# Patient Record
Sex: FEMALE | Race: WHITE | Employment: OTHER | URBAN - METROPOLITAN AREA
[De-identification: names, ages, dates, MRNs, and addresses within clinical notes are randomized per-mention and may not be internally consistent; named-entity substitution may affect disease eponyms.]

---

## 2018-10-03 ENCOUNTER — HOSPITAL ENCOUNTER (EMERGENCY)
Age: 83
Discharge: HOME | End: 2018-10-03
Payer: MEDICARE

## 2018-10-03 ENCOUNTER — APPOINTMENT (OUTPATIENT)
Dept: CT IMAGING | Age: 83
End: 2018-10-03
Payer: MEDICARE

## 2018-10-03 VITALS
BODY MASS INDEX: 36.68 KG/M2 | HEIGHT: 57 IN | RESPIRATION RATE: 20 BRPM | DIASTOLIC BLOOD PRESSURE: 66 MMHG | TEMPERATURE: 97.8 F | HEART RATE: 63 BPM | SYSTOLIC BLOOD PRESSURE: 152 MMHG | WEIGHT: 170 LBS | OXYGEN SATURATION: 97 %

## 2018-10-03 DIAGNOSIS — R16.1 SPLENOMEGALY: ICD-10-CM

## 2018-10-03 DIAGNOSIS — R10.9 FLANK PAIN: ICD-10-CM

## 2018-10-03 DIAGNOSIS — R35.0 URINARY FREQUENCY: Primary | ICD-10-CM

## 2018-10-03 LAB
ALBUMIN SERPL-MCNC: 3.9 G/DL (ref 3.5–5.2)
ALP BLD-CCNC: 58 U/L (ref 35–104)
ALT SERPL-CCNC: 9 U/L (ref 0–32)
ANION GAP SERPL CALCULATED.3IONS-SCNC: 12 MMOL/L (ref 7–16)
AST SERPL-CCNC: 19 U/L (ref 0–31)
BASOPHILS ABSOLUTE: 0.06 E9/L (ref 0–0.2)
BASOPHILS RELATIVE PERCENT: 0.7 % (ref 0–2)
BILIRUB SERPL-MCNC: 0.4 MG/DL (ref 0–1.2)
BILIRUBIN URINE: NEGATIVE
BLOOD, URINE: NEGATIVE
BUN BLDV-MCNC: 18 MG/DL (ref 8–23)
CALCIUM SERPL-MCNC: 9.3 MG/DL (ref 8.6–10.2)
CHLORIDE BLD-SCNC: 106 MMOL/L (ref 98–107)
CLARITY: CLEAR
CO2: 22 MMOL/L (ref 22–29)
COLOR: YELLOW
CREAT SERPL-MCNC: 1 MG/DL (ref 0.5–1)
EOSINOPHILS ABSOLUTE: 0.17 E9/L (ref 0.05–0.5)
EOSINOPHILS RELATIVE PERCENT: 1.9 % (ref 0–6)
GFR AFRICAN AMERICAN: >60
GFR NON-AFRICAN AMERICAN: 52 ML/MIN/1.73
GLUCOSE BLD-MCNC: 190 MG/DL (ref 74–109)
GLUCOSE URINE: NEGATIVE MG/DL
HCT VFR BLD CALC: 37.1 % (ref 34–48)
HEMOGLOBIN: 11.7 G/DL (ref 11.5–15.5)
IMMATURE GRANULOCYTES #: 0.15 E9/L
IMMATURE GRANULOCYTES %: 1.7 % (ref 0–5)
KETONES, URINE: NEGATIVE MG/DL
LEUKOCYTE ESTERASE, URINE: NEGATIVE
LYMPHOCYTES ABSOLUTE: 2.24 E9/L (ref 1.5–4)
LYMPHOCYTES RELATIVE PERCENT: 25.4 % (ref 20–42)
MCH RBC QN AUTO: 28 PG (ref 26–35)
MCHC RBC AUTO-ENTMCNC: 31.5 % (ref 32–34.5)
MCV RBC AUTO: 88.8 FL (ref 80–99.9)
MONOCYTES ABSOLUTE: 0.72 E9/L (ref 0.1–0.95)
MONOCYTES RELATIVE PERCENT: 8.2 % (ref 2–12)
NEUTROPHILS ABSOLUTE: 5.49 E9/L (ref 1.8–7.3)
NEUTROPHILS RELATIVE PERCENT: 62.1 % (ref 43–80)
NITRITE, URINE: NEGATIVE
PDW BLD-RTO: 14.5 FL (ref 11.5–15)
PH UA: 5.5 (ref 5–9)
PLATELET # BLD: 196 E9/L (ref 130–450)
PMV BLD AUTO: 12.2 FL (ref 7–12)
POTASSIUM SERPL-SCNC: 4.3 MMOL/L (ref 3.5–5)
PROTEIN UA: NEGATIVE MG/DL
RBC # BLD: 4.18 E12/L (ref 3.5–5.5)
SODIUM BLD-SCNC: 140 MMOL/L (ref 132–146)
SPECIFIC GRAVITY UA: 1.01 (ref 1–1.03)
TOTAL PROTEIN: 6.9 G/DL (ref 6.4–8.3)
UROBILINOGEN, URINE: 0.2 E.U./DL
WBC # BLD: 8.8 E9/L (ref 4.5–11.5)

## 2018-10-03 PROCEDURE — 80053 COMPREHEN METABOLIC PANEL: CPT

## 2018-10-03 PROCEDURE — 85025 COMPLETE CBC W/AUTO DIFF WBC: CPT

## 2018-10-03 PROCEDURE — 6360000004 HC RX CONTRAST MEDICATION: Performed by: RADIOLOGY

## 2018-10-03 PROCEDURE — 99284 EMERGENCY DEPT VISIT MOD MDM: CPT

## 2018-10-03 PROCEDURE — 74177 CT ABD & PELVIS W/CONTRAST: CPT

## 2018-10-03 PROCEDURE — 36415 COLL VENOUS BLD VENIPUNCTURE: CPT

## 2018-10-03 PROCEDURE — 87088 URINE BACTERIA CULTURE: CPT

## 2018-10-03 PROCEDURE — 81003 URINALYSIS AUTO W/O SCOPE: CPT

## 2018-10-03 RX ADMIN — IOPAMIDOL 80 ML: 755 INJECTION, SOLUTION INTRAVENOUS at 10:35

## 2018-10-03 ASSESSMENT — PAIN DESCRIPTION - ORIENTATION: ORIENTATION: LEFT;RIGHT

## 2018-10-03 ASSESSMENT — PAIN DESCRIPTION - DESCRIPTORS: DESCRIPTORS: ACHING

## 2018-10-03 ASSESSMENT — PAIN DESCRIPTION - LOCATION: LOCATION: FLANK

## 2018-10-03 ASSESSMENT — PAIN DESCRIPTION - FREQUENCY: FREQUENCY: CONTINUOUS

## 2018-10-03 ASSESSMENT — PAIN SCALES - GENERAL: PAINLEVEL_OUTOF10: 5

## 2018-10-03 ASSESSMENT — PAIN DESCRIPTION - PAIN TYPE: TYPE: ACUTE PAIN

## 2018-10-05 LAB — URINE CULTURE, ROUTINE: NORMAL

## 2019-02-15 ENCOUNTER — OFFICE VISIT (OUTPATIENT)
Dept: ORTHOPEDIC SURGERY | Age: 84
End: 2019-02-15
Payer: MEDICARE

## 2019-02-15 VITALS — BODY MASS INDEX: 36.68 KG/M2 | HEIGHT: 57 IN | WEIGHT: 170 LBS

## 2019-02-15 DIAGNOSIS — M70.62 TROCHANTERIC BURSITIS, LEFT HIP: ICD-10-CM

## 2019-02-15 DIAGNOSIS — M25.552 LEFT HIP PAIN: ICD-10-CM

## 2019-02-15 DIAGNOSIS — M54.32 SCIATICA OF LEFT SIDE: Primary | ICD-10-CM

## 2019-02-15 PROCEDURE — 99203 OFFICE O/P NEW LOW 30 MIN: CPT | Performed by: ORTHOPAEDIC SURGERY

## 2019-03-12 ENCOUNTER — OFFICE VISIT (OUTPATIENT)
Dept: PHYSICAL MEDICINE AND REHAB | Age: 84
End: 2019-03-12
Payer: MEDICARE

## 2019-03-12 VITALS
HEIGHT: 60 IN | BODY MASS INDEX: 32.59 KG/M2 | HEART RATE: 98 BPM | DIASTOLIC BLOOD PRESSURE: 75 MMHG | SYSTOLIC BLOOD PRESSURE: 140 MMHG | WEIGHT: 166 LBS

## 2019-03-12 DIAGNOSIS — Z74.09 IMPAIRED MOBILITY AND ADLS: ICD-10-CM

## 2019-03-12 DIAGNOSIS — M79.18 LEFT BUTTOCK PAIN: Primary | ICD-10-CM

## 2019-03-12 DIAGNOSIS — Z78.9 IMPAIRED MOBILITY AND ADLS: ICD-10-CM

## 2019-03-12 DIAGNOSIS — M53.3 SACROILIAC DYSFUNCTION: ICD-10-CM

## 2019-03-12 PROCEDURE — 99203 OFFICE O/P NEW LOW 30 MIN: CPT | Performed by: PHYSICAL MEDICINE & REHABILITATION

## 2019-07-15 ENCOUNTER — HOSPITAL ENCOUNTER (OUTPATIENT)
Dept: ULTRASOUND IMAGING | Age: 84
Discharge: HOME | End: 2019-07-15
Payer: MEDICARE

## 2019-07-15 DIAGNOSIS — R52 PAIN: ICD-10-CM

## 2019-07-15 PROCEDURE — 93971 EXTREMITY STUDY: CPT

## 2019-10-26 ENCOUNTER — APPOINTMENT (OUTPATIENT)
Dept: GENERAL RADIOLOGY | Age: 84
End: 2019-10-26
Payer: MEDICARE

## 2019-10-26 ENCOUNTER — APPOINTMENT (OUTPATIENT)
Dept: CT IMAGING | Age: 84
End: 2019-10-26
Payer: MEDICARE

## 2019-10-26 ENCOUNTER — HOSPITAL ENCOUNTER (EMERGENCY)
Age: 84
Discharge: ANOTHER HOSPITAL | End: 2019-10-26
Attending: EMERGENCY MEDICINE
Payer: MEDICARE

## 2019-10-26 VITALS
SYSTOLIC BLOOD PRESSURE: 106 MMHG | OXYGEN SATURATION: 95 % | BODY MASS INDEX: 35.48 KG/M2 | RESPIRATION RATE: 16 BRPM | WEIGHT: 169 LBS | HEIGHT: 58 IN | HEART RATE: 72 BPM | DIASTOLIC BLOOD PRESSURE: 43 MMHG | TEMPERATURE: 98.2 F

## 2019-10-26 DIAGNOSIS — I10 HYPERTENSION, UNSPECIFIED TYPE: ICD-10-CM

## 2019-10-26 DIAGNOSIS — R42 DIZZINESS: ICD-10-CM

## 2019-10-26 DIAGNOSIS — G93.6 VASOGENIC CEREBRAL EDEMA (HCC): ICD-10-CM

## 2019-10-26 DIAGNOSIS — I61.4 LEFT-SIDED NONTRAUMATIC INTRACEREBRAL HEMORRHAGE OF CEREBELLUM (HCC): Primary | ICD-10-CM

## 2019-10-26 DIAGNOSIS — R11.2 NON-INTRACTABLE VOMITING WITH NAUSEA, UNSPECIFIED VOMITING TYPE: ICD-10-CM

## 2019-10-26 DIAGNOSIS — R51.9 ACUTE INTRACTABLE HEADACHE, UNSPECIFIED HEADACHE TYPE: ICD-10-CM

## 2019-10-26 LAB
ANION GAP SERPL CALCULATED.3IONS-SCNC: 9 MMOL/L (ref 7–16)
BACTERIA: NORMAL /HPF
BILIRUBIN URINE: NEGATIVE
BLOOD, URINE: NEGATIVE
BUN BLDV-MCNC: 19 MG/DL (ref 8–23)
CALCIUM SERPL-MCNC: 9 MG/DL (ref 8.6–10.2)
CHLORIDE BLD-SCNC: 108 MMOL/L (ref 98–107)
CLARITY: CLEAR
CO2: 23 MMOL/L (ref 22–29)
COLOR: YELLOW
CREAT SERPL-MCNC: 0.9 MG/DL (ref 0.5–1)
EPITHELIAL CELLS, UA: NORMAL /HPF
GFR AFRICAN AMERICAN: >60
GFR NON-AFRICAN AMERICAN: 59 ML/MIN/1.73
GLUCOSE BLD-MCNC: 284 MG/DL (ref 74–99)
GLUCOSE URINE: 100 MG/DL
HCT VFR BLD CALC: 33.3 % (ref 34–48)
HEMOGLOBIN: 10.5 G/DL (ref 11.5–15.5)
KETONES, URINE: NEGATIVE MG/DL
LEUKOCYTE ESTERASE, URINE: NEGATIVE
MCH RBC QN AUTO: 28.3 PG (ref 26–35)
MCHC RBC AUTO-ENTMCNC: 31.5 % (ref 32–34.5)
MCV RBC AUTO: 89.8 FL (ref 80–99.9)
NITRITE, URINE: NEGATIVE
PDW BLD-RTO: 14.8 FL (ref 11.5–15)
PH UA: 6.5 (ref 5–9)
PLATELET # BLD: 230 E9/L (ref 130–450)
PMV BLD AUTO: 11.3 FL (ref 7–12)
POTASSIUM SERPL-SCNC: 4 MMOL/L (ref 3.5–5)
PRO-BNP: 200 PG/ML (ref 0–450)
PROTEIN UA: ABNORMAL MG/DL
RBC # BLD: 3.71 E12/L (ref 3.5–5.5)
RBC UA: NORMAL /HPF (ref 0–2)
SODIUM BLD-SCNC: 140 MMOL/L (ref 132–146)
SPECIFIC GRAVITY UA: 1.01 (ref 1–1.03)
TROPONIN: <0.01 NG/ML (ref 0–0.03)
UROBILINOGEN, URINE: 0.2 E.U./DL
WBC # BLD: 7.4 E9/L (ref 4.5–11.5)
WBC UA: NORMAL /HPF (ref 0–5)

## 2019-10-26 PROCEDURE — 84484 ASSAY OF TROPONIN QUANT: CPT

## 2019-10-26 PROCEDURE — 93005 ELECTROCARDIOGRAM TRACING: CPT | Performed by: PHYSICIAN ASSISTANT

## 2019-10-26 PROCEDURE — 96365 THER/PROPH/DIAG IV INF INIT: CPT

## 2019-10-26 PROCEDURE — 96375 TX/PRO/DX INJ NEW DRUG ADDON: CPT

## 2019-10-26 PROCEDURE — 96376 TX/PRO/DX INJ SAME DRUG ADON: CPT

## 2019-10-26 PROCEDURE — 81001 URINALYSIS AUTO W/SCOPE: CPT

## 2019-10-26 PROCEDURE — 83880 ASSAY OF NATRIURETIC PEPTIDE: CPT

## 2019-10-26 PROCEDURE — 6370000000 HC RX 637 (ALT 250 FOR IP): Performed by: PHYSICIAN ASSISTANT

## 2019-10-26 PROCEDURE — 71045 X-RAY EXAM CHEST 1 VIEW: CPT

## 2019-10-26 PROCEDURE — 2500000003 HC RX 250 WO HCPCS: Performed by: EMERGENCY MEDICINE

## 2019-10-26 PROCEDURE — 36415 COLL VENOUS BLD VENIPUNCTURE: CPT

## 2019-10-26 PROCEDURE — 6360000002 HC RX W HCPCS: Performed by: EMERGENCY MEDICINE

## 2019-10-26 PROCEDURE — 87088 URINE BACTERIA CULTURE: CPT

## 2019-10-26 PROCEDURE — 85027 COMPLETE CBC AUTOMATED: CPT

## 2019-10-26 PROCEDURE — 70450 CT HEAD/BRAIN W/O DYE: CPT

## 2019-10-26 PROCEDURE — 6360000002 HC RX W HCPCS: Performed by: PHYSICIAN ASSISTANT

## 2019-10-26 PROCEDURE — 99291 CRITICAL CARE FIRST HOUR: CPT

## 2019-10-26 PROCEDURE — 80048 BASIC METABOLIC PNL TOTAL CA: CPT

## 2019-10-26 RX ORDER — ACETAMINOPHEN 325 MG/1
650 TABLET ORAL ONCE
Status: COMPLETED | OUTPATIENT
Start: 2019-10-26 | End: 2019-10-26

## 2019-10-26 RX ORDER — DEXAMETHASONE SODIUM PHOSPHATE 4 MG/ML
4 INJECTION, SOLUTION INTRA-ARTICULAR; INTRALESIONAL; INTRAMUSCULAR; INTRAVENOUS; SOFT TISSUE ONCE
Status: COMPLETED | OUTPATIENT
Start: 2019-10-26 | End: 2019-10-26

## 2019-10-26 RX ORDER — SODIUM CHLORIDE 0.9 % (FLUSH) 0.9 %
SYRINGE (ML) INJECTION
Status: DISCONTINUED
Start: 2019-10-26 | End: 2019-10-26 | Stop reason: HOSPADM

## 2019-10-26 RX ORDER — METOCLOPRAMIDE HYDROCHLORIDE 5 MG/ML
5 INJECTION INTRAMUSCULAR; INTRAVENOUS ONCE
Status: COMPLETED | OUTPATIENT
Start: 2019-10-26 | End: 2019-10-26

## 2019-10-26 RX ORDER — MECLIZINE HCL 12.5 MG/1
12.5 TABLET ORAL ONCE
Status: COMPLETED | OUTPATIENT
Start: 2019-10-26 | End: 2019-10-26

## 2019-10-26 RX ORDER — FENTANYL CITRATE 50 UG/ML
25 INJECTION, SOLUTION INTRAMUSCULAR; INTRAVENOUS ONCE
Status: COMPLETED | OUTPATIENT
Start: 2019-10-26 | End: 2019-10-26

## 2019-10-26 RX ORDER — LEVETIRACETAM 5 MG/ML
500 INJECTION INTRAVASCULAR ONCE
Status: COMPLETED | OUTPATIENT
Start: 2019-10-26 | End: 2019-10-26

## 2019-10-26 RX ORDER — LABETALOL 20 MG/4 ML (5 MG/ML) INTRAVENOUS SYRINGE
10 ONCE
Status: COMPLETED | OUTPATIENT
Start: 2019-10-26 | End: 2019-10-26

## 2019-10-26 RX ORDER — LABETALOL 20 MG/4 ML (5 MG/ML) INTRAVENOUS SYRINGE
5 ONCE
Status: COMPLETED | OUTPATIENT
Start: 2019-10-26 | End: 2019-10-26

## 2019-10-26 RX ORDER — ONDANSETRON 2 MG/ML
4 INJECTION INTRAMUSCULAR; INTRAVENOUS ONCE
Status: COMPLETED | OUTPATIENT
Start: 2019-10-26 | End: 2019-10-26

## 2019-10-26 RX ADMIN — ACETAMINOPHEN 650 MG: 325 TABLET, FILM COATED ORAL at 01:39

## 2019-10-26 RX ADMIN — DEXAMETHASONE SODIUM PHOSPHATE 4 MG: 4 INJECTION, SOLUTION INTRA-ARTICULAR; INTRALESIONAL; INTRAMUSCULAR; INTRAVENOUS; SOFT TISSUE at 02:24

## 2019-10-26 RX ADMIN — LEVETIRACETAM 500 MG: 5 INJECTION INTRAVENOUS at 02:24

## 2019-10-26 RX ADMIN — LABETALOL 20 MG/4 ML (5 MG/ML) INTRAVENOUS SYRINGE 10 MG: at 03:01

## 2019-10-26 RX ADMIN — LABETALOL 20 MG/4 ML (5 MG/ML) INTRAVENOUS SYRINGE 5 MG: at 02:21

## 2019-10-26 RX ADMIN — FENTANYL CITRATE 25 MCG: 50 INJECTION, SOLUTION INTRAMUSCULAR; INTRAVENOUS at 02:21

## 2019-10-26 RX ADMIN — ONDANSETRON 4 MG: 2 INJECTION INTRAMUSCULAR; INTRAVENOUS at 01:39

## 2019-10-26 RX ADMIN — FENTANYL CITRATE 25 MCG: 50 INJECTION, SOLUTION INTRAMUSCULAR; INTRAVENOUS at 03:50

## 2019-10-26 RX ADMIN — METOCLOPRAMIDE 5 MG: 5 INJECTION, SOLUTION INTRAMUSCULAR; INTRAVENOUS at 03:02

## 2019-10-26 RX ADMIN — MECLIZINE 12.5 MG: 12.5 TABLET ORAL at 01:39

## 2019-10-26 ASSESSMENT — PAIN SCALES - GENERAL
PAINLEVEL_OUTOF10: 4
PAINLEVEL_OUTOF10: 7
PAINLEVEL_OUTOF10: 4
PAINLEVEL_OUTOF10: 7

## 2019-10-27 LAB
EKG ATRIAL RATE: 78 BPM
EKG P AXIS: 57 DEGREES
EKG P-R INTERVAL: 138 MS
EKG Q-T INTERVAL: 428 MS
EKG QRS DURATION: 94 MS
EKG QTC CALCULATION (BAZETT): 487 MS
EKG R AXIS: -25 DEGREES
EKG T AXIS: 76 DEGREES
EKG VENTRICULAR RATE: 78 BPM

## 2019-10-27 PROCEDURE — 93010 ELECTROCARDIOGRAM REPORT: CPT | Performed by: INTERNAL MEDICINE

## 2019-10-28 LAB — URINE CULTURE, ROUTINE: NORMAL

## 2020-01-31 ENCOUNTER — TELEPHONE (OUTPATIENT)
Dept: SURGERY | Age: 85
End: 2020-01-31

## 2020-01-31 ENCOUNTER — OFFICE VISIT (OUTPATIENT)
Dept: SURGERY | Age: 85
End: 2020-01-31
Payer: MEDICARE

## 2020-01-31 VITALS
BODY MASS INDEX: 35.48 KG/M2 | WEIGHT: 169 LBS | TEMPERATURE: 98.1 F | HEIGHT: 58 IN | RESPIRATION RATE: 17 BRPM | SYSTOLIC BLOOD PRESSURE: 175 MMHG | HEART RATE: 78 BPM | DIASTOLIC BLOOD PRESSURE: 72 MMHG

## 2020-01-31 PROCEDURE — 99204 OFFICE O/P NEW MOD 45 MIN: CPT | Performed by: SURGERY

## 2020-01-31 RX ORDER — SULFAMETHOXAZOLE AND TRIMETHOPRIM 800; 160 MG/1; MG/1
1 TABLET ORAL 2 TIMES DAILY
Qty: 10 TABLET | Refills: 0 | Status: SHIPPED | OUTPATIENT
Start: 2020-01-31 | End: 2020-02-03 | Stop reason: ALTCHOICE

## 2020-01-31 RX ORDER — SODIUM CHLORIDE 9 MG/ML
INJECTION, SOLUTION INTRAVENOUS CONTINUOUS
Status: CANCELLED | OUTPATIENT
Start: 2020-01-31

## 2020-01-31 NOTE — PROGRESS NOTES
Karin Rodriguez  1/31/2020      Office Consult    CHIEF COMPLAINT:  Infected sebaceous cyst left scalp    HISTORY OF PRESENT ILLNESS:  Karin Rodriguez is a 80 y.o.  female with a painful 2 cm cyst on the left scalp. Says it has been present for years but just started bothering her. She has had several cysts removed in the past.     Past Medical History: She has a past medical history of Arthritis, Bowel obstruction ([address] Diabetes mellitus ([address] Diverticulosis, Hyperlipidemia, Hypertension, Irritable bowel syndrome, Peptic ulcer disease, Spinal stenosis, and Syncope and collapse. Past Surgical History: She has a past surgical history that includes Hysterectomy; Cholecystectomy; Tonsillectomy; Colon surgery; Appendectomy; Breast surgery; joint replacement; ECHO Compl W Dop Color Flow (4/22/2013); Thyroid surgery; Small intestine surgery; Knee arthroscopy; and Total knee arthroplasty (Right). Home Medications  Prior to Visit Medications    Medication Sig Taking? Authorizing Provider   metoprolol succinate (TOPROL XL) 25 MG extended release tablet Take 25 mg by mouth 2 times daily Yes Historical Provider, MD   amLODIPine (NORVASC) 5 MG tablet Take 5 mg by mouth daily. Yes Historical Provider, MD   losartan (COZAAR) 100 MG tablet Take 100 mg by mouth daily. Yes Historical Provider, MD   sitaGLIPtan-metformin (JANUMET)  MG per tablet Take 1 tablet by mouth 2 times daily (with meals). Yes Historical Provider, MD   fenofibrate (TRIGLIDE) 160 MG tablet Take 160 mg by mouth daily. Yes Historical Provider, MD   glimepiride (AMARYL) 4 MG tablet Take 6 mg by mouth every morning (before breakfast). Yes Historical Provider, MD       Allergies: Codeine and Morphine   Social History:   TOBACCO:   reports that she has never smoked. She has never used smokeless tobacco.  All smokers should join the free smoking cessation program and stop smoking before having surgery. ETOH:    reports no history of alcohol use. Problem Relation Age of Onset    Cancer Father        Review of Systems:  Psychiatric: denies depression and anxiety  Respiratory: negative  Cardiovascular: negative  Gastrointestinal: negative  Musculoskeletal:negative  All others reviewed, negative    Physical Exam:   VITALS: Blood pressure (!) 175/72, pulse 78, temperature 98.1 °F (36.7 °C), resp. rate 17, height 4' 10\" (1.473 m), weight 169 lb (76.7 kg). General appearance: alert, appears stated age and cooperative, does ambulate easily  Head: 2 cm firm cyst left scalp, slight redness of the skin, no drainage. Eyes: PERRL  Ears/mouth/throat:  Ears clear, mouth normal, throat no redness  Neck: no adenopathy, no JVD, supple, symmetrical, trachea midline and thyroid not enlarged  Lungs: clear to auscultation bilaterally  Heart: regular rate and rhythm  Abdomen: soft, non-tender; bowel sounds normal; no masses,  no organomegaly  Extremities: extremities normal, atraumatic, no cyanosis or edema  Skin: no open wounds    ASSESSMENT: sebaceous cyst left scalp    PLAN:   Bactrim DS bid for 5 days. surgical excision left scalp cyst next week. Signed: Dr. Adeline Posada M.D.     Send copy of consult to PCP, Jose R Hernandez MD

## 2020-01-31 NOTE — TELEPHONE ENCOUNTER
Per Dr. Frantz Bell, patient is scheduled for excision left scalp cyst at [address] [place] [address] on 02/04/2020. Surgery scheduling form faxed with confirmation, surgeon's calendar updated. Dr. Frantz Bell to enter orders. Follow up appointment scheduled. . Will check if pre-cert is required. MA called and spoke with Ivania Zimmerman regarding CPT 00902 and ICD 10 L72.9. Rep advised there was no prior authorization required. Ref # [id number]    Patient is scheduled for surgery confirmed on appointment desk. Forms scanned into chart.     Electronically signed by Shefali Vance on 2/4/2020 at 12:09 PM

## 2020-02-03 NOTE — PROGRESS NOTES
makeup) or nail polish on your fingers or toes. 11. DO NOT wear any jewelry or piercings on day of surgery. All body piercing jewelry must be removed. 12. Shower the night before surgery with _x__Antibacterial soap /MARIE WIPES________    13. TOTAL JOINT REPLACEMENT/HYSTERECTOMY PATIENTS ONLY---Remember to bring Blood Bank bracelet to the hospital on the day of surgery. 14. If you have a Living Will and Durable Power of  for Healthcare, please bring in a copy. 15. If appropriate bring crutches, inspirex, WALKER, CANE etc... 12. Notify your Surgeon if you develop any illness between now and surgery time, cough, cold, fever, sore throat, nausea, vomiting, etc.  Please notify your surgeon if you experience dizziness, shortness of breath or blurred vision between now & the time of your surgery. 17. If you have ___dentures, they will be removed before going to the OR; we will provide you a container. If you wear ___contact lenses or __x_glasses, they will be removed; please bring a case for them. 18. To provide excellent care visitors will be limited to 2 in the room at any given time. 19. Please bring picture ID and insurance card. 20. Sleep apnea patients need to bring CPAP AND SETTINGS to hospital on day of surgery. 21. During flu season no children under the age of 15 are permitted in the hospital for the safety of all patients. 22. Other                  Please call AMBULATORY CARE if you have any further questions.    [address]     [address]

## 2020-02-04 ENCOUNTER — HOSPITAL ENCOUNTER (OUTPATIENT)
Age: 85
Setting detail: OUTPATIENT SURGERY
Discharge: HOME | End: 2020-02-04
Attending: SURGERY | Admitting: SURGERY
Payer: MEDICARE

## 2020-02-04 ENCOUNTER — ANESTHESIA (OUTPATIENT)
Dept: OPERATING ROOM | Age: 85
End: 2020-02-04
Payer: MEDICARE

## 2020-02-04 ENCOUNTER — ANESTHESIA EVENT (OUTPATIENT)
Dept: OPERATING ROOM | Age: 85
End: 2020-02-04
Payer: MEDICARE

## 2020-02-04 VITALS
SYSTOLIC BLOOD PRESSURE: 135 MMHG | RESPIRATION RATE: 19 BRPM | DIASTOLIC BLOOD PRESSURE: 56 MMHG | OXYGEN SATURATION: 98 %

## 2020-02-04 VITALS
WEIGHT: 165 LBS | HEART RATE: 70 BPM | SYSTOLIC BLOOD PRESSURE: 169 MMHG | DIASTOLIC BLOOD PRESSURE: 69 MMHG | HEIGHT: 58 IN | RESPIRATION RATE: 16 BRPM | BODY MASS INDEX: 34.63 KG/M2 | TEMPERATURE: 96.7 F | OXYGEN SATURATION: 97 %

## 2020-02-04 LAB
ANION GAP SERPL CALCULATED.3IONS-SCNC: 15 MMOL/L (ref 7–16)
BUN BLDV-MCNC: 21 MG/DL (ref 8–23)
CALCIUM SERPL-MCNC: 9.8 MG/DL (ref 8.6–10.2)
CHLORIDE BLD-SCNC: 103 MMOL/L (ref 98–107)
CO2: 21 MMOL/L (ref 22–29)
CREAT SERPL-MCNC: 1.3 MG/DL (ref 0.5–1)
GFR AFRICAN AMERICAN: 46
GFR NON-AFRICAN AMERICAN: 38 ML/MIN/1.73
GLUCOSE BLD-MCNC: 203 MG/DL (ref 74–99)
HCT VFR BLD CALC: 40.3 % (ref 34–48)
HEMOGLOBIN: 13 G/DL (ref 11.5–15.5)
MCH RBC QN AUTO: 27.8 PG (ref 26–35)
MCHC RBC AUTO-ENTMCNC: 32.3 % (ref 32–34.5)
MCV RBC AUTO: 86.3 FL (ref 80–99.9)
PDW BLD-RTO: 15.8 FL (ref 11.5–15)
PLATELET # BLD: 264 E9/L (ref 130–450)
PMV BLD AUTO: 12 FL (ref 7–12)
POTASSIUM REFLEX MAGNESIUM: 4.4 MMOL/L (ref 3.5–5)
RBC # BLD: 4.67 E12/L (ref 3.5–5.5)
SODIUM BLD-SCNC: 139 MMOL/L (ref 132–146)
WBC # BLD: 9.1 E9/L (ref 4.5–11.5)

## 2020-02-04 PROCEDURE — 80048 BASIC METABOLIC PNL TOTAL CA: CPT

## 2020-02-04 PROCEDURE — 7100000010 HC PHASE II RECOVERY - FIRST 15 MIN: Performed by: SURGERY

## 2020-02-04 PROCEDURE — 3700000000 HC ANESTHESIA ATTENDED CARE: Performed by: SURGERY

## 2020-02-04 PROCEDURE — 3700000001 HC ADD 15 MINUTES (ANESTHESIA): Performed by: SURGERY

## 2020-02-04 PROCEDURE — 88304 TISSUE EXAM BY PATHOLOGIST: CPT

## 2020-02-04 PROCEDURE — 7100000011 HC PHASE II RECOVERY - ADDTL 15 MIN: Performed by: SURGERY

## 2020-02-04 PROCEDURE — 3600000012 HC SURGERY LEVEL 2 ADDTL 15MIN: Performed by: SURGERY

## 2020-02-04 PROCEDURE — 11422 EXC H-F-NK-SP B9+MARG 1.1-2: CPT | Performed by: SURGERY

## 2020-02-04 PROCEDURE — 36415 COLL VENOUS BLD VENIPUNCTURE: CPT

## 2020-02-04 PROCEDURE — 6360000002 HC RX W HCPCS: Performed by: SURGERY

## 2020-02-04 PROCEDURE — 3600000002 HC SURGERY LEVEL 2 BASE: Performed by: SURGERY

## 2020-02-04 PROCEDURE — 2580000003 HC RX 258: Performed by: SURGERY

## 2020-02-04 PROCEDURE — 2709999900 HC NON-CHARGEABLE SUPPLY: Performed by: SURGERY

## 2020-02-04 PROCEDURE — 85027 COMPLETE CBC AUTOMATED: CPT

## 2020-02-04 PROCEDURE — 6360000002 HC RX W HCPCS: Performed by: ANESTHESIOLOGIST ASSISTANT

## 2020-02-04 PROCEDURE — 2500000003 HC RX 250 WO HCPCS: Performed by: SURGERY

## 2020-02-04 RX ORDER — CEFAZOLIN SODIUM 2 G/50ML
2 SOLUTION INTRAVENOUS
Status: COMPLETED | OUTPATIENT
Start: 2020-02-04 | End: 2020-02-04

## 2020-02-04 RX ORDER — SODIUM CHLORIDE 9 MG/ML
INJECTION, SOLUTION INTRAVENOUS CONTINUOUS
Status: DISCONTINUED | OUTPATIENT
Start: 2020-02-04 | End: 2020-02-04 | Stop reason: HOSPADM

## 2020-02-04 RX ORDER — BUPIVACAINE HYDROCHLORIDE AND EPINEPHRINE 2.5; 5 MG/ML; UG/ML
INJECTION, SOLUTION EPIDURAL; INFILTRATION; INTRACAUDAL; PERINEURAL PRN
Status: DISCONTINUED | OUTPATIENT
Start: 2020-02-04 | End: 2020-02-04 | Stop reason: ALTCHOICE

## 2020-02-04 RX ORDER — PROPOFOL 10 MG/ML
INJECTION, EMULSION INTRAVENOUS CONTINUOUS PRN
Status: DISCONTINUED | OUTPATIENT
Start: 2020-02-04 | End: 2020-02-04 | Stop reason: SDUPTHER

## 2020-02-04 RX ADMIN — SODIUM CHLORIDE: 9 INJECTION, SOLUTION INTRAVENOUS at 11:29

## 2020-02-04 RX ADMIN — CEFAZOLIN SODIUM 2 G: 2 SOLUTION INTRAVENOUS at 11:38

## 2020-02-04 RX ADMIN — PROPOFOL 50 MCG/KG/MIN: 10 INJECTION, EMULSION INTRAVENOUS at 11:37

## 2020-02-04 RX ADMIN — SODIUM CHLORIDE: 9 INJECTION, SOLUTION INTRAVENOUS at 08:30

## 2020-02-04 ASSESSMENT — PULMONARY FUNCTION TESTS
PIF_VALUE: 9
PIF_VALUE: 10
PIF_VALUE: 9
PIF_VALUE: 8
PIF_VALUE: 7
PIF_VALUE: 6
PIF_VALUE: 6
PIF_VALUE: 10
PIF_VALUE: 7
PIF_VALUE: 12
PIF_VALUE: 7
PIF_VALUE: 8
PIF_VALUE: 10
PIF_VALUE: 14
PIF_VALUE: 6
PIF_VALUE: 13
PIF_VALUE: 8
PIF_VALUE: 13
PIF_VALUE: 7
PIF_VALUE: 10
PIF_VALUE: 6
PIF_VALUE: 7
PIF_VALUE: 9
PIF_VALUE: 8
PIF_VALUE: 8
PIF_VALUE: 6
PIF_VALUE: 7
PIF_VALUE: 11
PIF_VALUE: 10
PIF_VALUE: 4
PIF_VALUE: 10
PIF_VALUE: 8

## 2020-02-04 ASSESSMENT — PAIN DESCRIPTION - LOCATION: LOCATION: HEAD

## 2020-02-04 ASSESSMENT — PAIN DESCRIPTION - PAIN TYPE: TYPE: SURGICAL PAIN

## 2020-02-04 ASSESSMENT — PAIN DESCRIPTION - ONSET: ONSET: GRADUAL

## 2020-02-04 ASSESSMENT — PAIN DESCRIPTION - PROGRESSION
CLINICAL_PROGRESSION: NOT CHANGED
CLINICAL_PROGRESSION: NOT CHANGED

## 2020-02-04 ASSESSMENT — PAIN SCALES - GENERAL
PAINLEVEL_OUTOF10: 3
PAINLEVEL_OUTOF10: 3

## 2020-02-04 ASSESSMENT — PAIN DESCRIPTION - FREQUENCY: FREQUENCY: INTERMITTENT

## 2020-02-04 ASSESSMENT — PAIN DESCRIPTION - DESCRIPTORS: DESCRIPTORS: SORE

## 2020-02-04 ASSESSMENT — PAIN - FUNCTIONAL ASSESSMENT
PAIN_FUNCTIONAL_ASSESSMENT: ACTIVITIES ARE NOT PREVENTED
PAIN_FUNCTIONAL_ASSESSMENT: 0-10

## 2020-02-04 ASSESSMENT — PAIN DESCRIPTION - ORIENTATION: ORIENTATION: LEFT

## 2020-02-04 NOTE — ANESTHESIA POSTPROCEDURE EVALUATION
Department of Anesthesiology  Postprocedure Note    Patient: Karina Edmondson  MRN: [id number]  YOB: 1928  Date of evaluation: 2/4/2020  Time:  12:15 PM     Procedure Summary     Date:  02/04/20 Room / Location:  [address] [place] [address] / [address] [place] [address]    Anesthesia Start:  1129 Anesthesia Stop:  [phone]    Procedure:  EXCISION OF LEFT SCALP CYST (Left Head) Diagnosis:  (LEFT SCALP CYST)    Surgeon:  Lei Richards MD Responsible Provider:  Won Crowe MD    Anesthesia Type:  MAC ASA Status:  3          Anesthesia Type: MAC    Brianda Phase I: Brianda Score: 10    Brianda Phase II:      Last vitals: Reviewed and per EMR flowsheets.        Anesthesia Post Evaluation    Patient location during evaluation: PACU  Patient participation: complete - patient participated  Level of consciousness: awake and alert  Pain score: 0  Airway patency: patent  Nausea & Vomiting: no vomiting and no nausea  Complications: no  Cardiovascular status: hemodynamically stable  Respiratory status: acceptable  Hydration status: stable

## 2020-02-04 NOTE — H&P
Keisha Soto  2/4/2020      H&P    CHIEF COMPLAINT:  Infected sebaceous cyst left scalp    HISTORY OF PRESENT ILLNESS:  Luis Marvin is a 80 y.o.  female with a painful 2 cm cyst on the left scalp. Says it has been present for years but just started bothering her. She has had several cysts removed in the past. It was getting red so she was treated with Bactrim DS bid for 5 days. Past Medical History: She has a past medical history of Arthritis, Bowel obstruction ([address] Diabetes mellitus ([address] Diverticulosis, Hyperlipidemia, Hypertension, Irritable bowel syndrome, Peptic ulcer disease, Spinal stenosis, and Syncope and collapse. Past Surgical History: She has a past surgical history that includes Hysterectomy; Cholecystectomy; Tonsillectomy; Colon surgery; Appendectomy; Breast surgery; joint replacement; ECHO Compl W Dop Color Flow (4/22/2013); Thyroid surgery; Small intestine surgery; Knee arthroscopy; Total knee arthroplasty (Right); and Colonoscopy. Home Medications  Prior to Visit Medications    Medication Sig Taking? Authorizing Provider   linagliptin (TRADJENTA) 5 MG tablet Take 5 mg by mouth daily Yes Historical Provider, MD   metoprolol tartrate (LOPRESSOR) 25 MG tablet Take 25 mg by mouth 2 times daily Yes Historical Provider, MD   amLODIPine (NORVASC) 5 MG tablet Take 5 mg by mouth daily. Yes Historical Provider, MD   losartan (COZAAR) 100 MG tablet Take 100 mg by mouth daily. Yes Historical Provider, MD   glimepiride (AMARYL) 4 MG tablet Take 4 mg by mouth every morning (before breakfast)  Yes Historical Provider, MD       Allergies: Codeine and Morphine   Social History:   TOBACCO:   reports that she has never smoked. She has never used smokeless tobacco.  All smokers should join the free smoking cessation program and stop smoking before having surgery. ETOH:    reports no history of alcohol use.        Problem Relation Age of Onset    Cancer Father        Review of Systems:  Psychiatric:

## 2020-02-04 NOTE — ANESTHESIA PRE PROCEDURE
Department of Anesthesiology  Preprocedure Note       Name:  Karina Edmondson   Age:  80 y.o.  :  3/26/1928                                          MRN:  [id number]         Date:  2020      Surgeon: Adenike Avalos):  Lei Richards MD    Procedure: EXCISION OF LEFT SCALP CYST (Left )    Medications prior to admission:   Prior to Admission medications    Medication Sig Start Date End Date Taking? Authorizing Provider   linagliptin (TRADJENTA) 5 MG tablet Take 5 mg by mouth daily   Yes Historical Provider, MD   metoprolol tartrate (LOPRESSOR) 25 MG tablet Take 25 mg by mouth 2 times daily   Yes Historical Provider, MD   amLODIPine (NORVASC) 5 MG tablet Take 5 mg by mouth daily. Yes Historical Provider, MD   losartan (COZAAR) 100 MG tablet Take 100 mg by mouth daily. Yes Historical Provider, MD   glimepiride (AMARYL) 4 MG tablet Take 4 mg by mouth every morning (before breakfast)    Yes Historical Provider, MD       Current medications:    Current Facility-Administered Medications   Medication Dose Route Frequency Provider Last Rate Last Dose    0.9 % sodium chloride infusion   Intravenous Continuous Lei Richards  mL/hr at 20 0830      ceFAZolin (ANCEF) 2 g in dextrose 3 % 50 mL IVPB (duplex)  2 g Intravenous On Call to [facility] MD Yanet           Allergies: Allergies   Allergen Reactions    Codeine Other (See Comments)     Patient states it makes her want to climb the wall.  Morphine Other (See Comments)     Makes her feel goofy.        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    Diverticulosis K57.90    Bowel obstruction ([address] K56.609    Irritable bowel syndrome K58.9    Peptic ulcer disease K27.9    Syncope and collapse R55       Past Medical History:        Diagnosis Date    Arthritis     [facility] [address] [facility]     Diabetes mellitus ([facility] [address] [facility]     Diverticulosis     Hyperlipidemia     Hypertension     Irritable bowel syndrome     Peptic ulcer disease     Spinal stenosis     Syncope and collapse 2013    admitted to [facility] due to [facility] TIA       Past Surgical History:        Procedure Laterality Date    APPENDECTOMY      BREAST SURGERY      CHOLECYSTECTOMY      COLON SURGERY      COLONOSCOPY      ECHO COMPL W DOP COLOR FLOW  4/22/2013         HYSTERECTOMY      JOINT REPLACEMENT      KNEE ARTHROSCOPY      left knee    SMALL INTESTINE SURGERY      obstructed bowel    THYROID SURGERY      goiter removed    TONSILLECTOMY      TOTAL KNEE ARTHROPLASTY Right        Social History:    Social History     Tobacco Use    Smoking status: Never Smoker    Smokeless tobacco: Never Used   Substance Use Topics    Alcohol use: No                                Counseling given: Not Answered      Vital Signs (Current):   Vitals:    02/03/20 0844 02/04/20 0812   BP:  (!) 189/77   Pulse:  66   Resp:  16   Temp:  97.9 °F (36.6 °C)   TempSrc:  Temporal   SpO2:  97%   Weight: 165 lb (74.8 kg) 165 lb (74.8 kg)   Height:  4' 10\" (1.473 m)                                              BP Readings from Last 3 Encounters:   02/04/20 (!) 189/77   01/31/20 (!) 175/72   10/26/19 (!) 106/43       NPO Status: Time of last liquid consumption: 1800                        Time of last solid consumption: 1800                        Date of last liquid consumption: 02/03/20                        Date of last solid food consumption: 02/03/20    BMI:   Wt Readings from Last 3 Encounters:   02/04/20 165 lb (74.8 kg)   01/31/20 169 lb (76.7 kg)   10/26/19 169 lb (76.7 kg)     Body mass index is 34.49 kg/m².     CBC:   Lab Results   Component Value Date    WBC 9.1 02/04/2020    RBC 4.67 02/04/2020    HGB 13.0 02/04/2020    HCT 40.3 02/04/2020    MCV 86.3 02/04/2020    RDW 15.8 02/04/2020     02/04/2020       CMP:   Lab Results   Component Value Date     10/26/2019    K 4.0 10/26/2019     10/26/2019    CO2 23 10/26/2019    BUN 19 10/26/2019    CREATININE 0.9 10/26/2019    GFRAA >60 10/26/2019    LABGLOM 59 10/26/2019    GLUCOSE 284 10/26/2019    GLUCOSE 131 04/22/2011    PROT 6.9 10/03/2018    CALCIUM 9.0 10/26/2019    BILITOT 0.4 10/03/2018    ALKPHOS 58 10/03/2018    AST 19 10/03/2018    ALT 9 10/03/2018       POC Tests: No results for input(s): POCGLU, POCNA, POCK, POCCL, POCBUN, POCHEMO, POCHCT in the last 72 hours. Coags:   Lab Results   Component Value Date    PROTIME 12.3 04/20/2013    INR 1.1 04/20/2013    APTT 36.5 04/20/2013       HCG (If Applicable): No results found for: PREGTESTUR, PREGSERUM, HCG, HCGQUANT     ABGs: No results found for: PHART, PO2ART, PCI3VJI, PFO7SQL, BEART, B4PTIBNZ     Type & Screen (If Applicable):  No results found for: [facility]    Anesthesia Evaluation  Patient summary reviewed and Nursing notes reviewed  Airway: Mallampati: II  TM distance: >3 FB   Neck ROM: full  Mouth opening: > = 3 FB Dental:    (+) edentulous      Pulmonary:Negative Pulmonary ROS and normal exam  breath sounds clear to auscultation                             Cardiovascular:    (+) hypertension: moderate,         Rhythm: regular  Rate: normal                    Neuro/Psych:   (+) TIA,             GI/Hepatic/Renal:   (+) PUD,           Endo/Other:    (+) DiabetesType II DM, , .                 Abdominal:   (+) obese,         Vascular: negative vascular ROS. Anesthesia Plan      MAC     ASA 3       Induction: intravenous. Anesthetic plan and risks discussed with patient. Plan discussed with CRNA and attending.                 Lianna Fay MD   2/4/2020

## 2020-02-04 NOTE — OP NOTE
[address]    Operative Report  DATE OF PROCEDURE: 2/4/2020  SURGEON: AMY Chappell: SURGEON: none   PREOPERATIVE DIAGNOSIS: Sebaceous Cyst (L72.3), infected (L08.9)  POSTOPERATIVE DIAGNOSIS: Same. OPERATION:  Excision of sebaceous cyst left scalp  ANESTHESIA: LMAC   ESTIMATED BLOOD LOSS: None. SPECIMEN: scalp cyst  COMPLICATIONS: None. HISTORY: Niranjan Lou is a  80 y.o.  female who has a cyst on the left scalp which got infected and painful. Feeling better after 5 days of Bactrim. We discussed the risks involved in the surgery including the risk of bleeding, infection, and needing further procedures. We also discussed the risks of anesthetic including MI, CVA, sudden death or reactions to anesthetic medications. The patient understood the risks. All questions were answered to the patient's satisfaction and they freely signed the consent and wished to proceed. OPERATION: The patient was placed on the table in a supine position. She was given Ancef  1 gram IV preop. The skin and hair was prepped with Betadine paint and draped in the usual fashion. The skin was numbed with marcaine plus epinephrine. A 2 cm curvilinear incision was made around the 12 mm mass. Scalpel was used to dissect down and around the mass which was freed from the fascia. The wound was well irrigated, small bleeders were controlled with cautery. The wound was closed with 4-0 Monocryl dermal sutures. Skin-Afix glue was applied to the incision and an ice bag to decrease pain and bruising. The needle and sponge count were correct. The patient tolerated the procedure well and went to recovery in stable condition. Plan:  Keep ice and pressure on the wound and use Tylenol and Ibuprofen for pain.     Physician Signature: Electronically signed by Dr. Mariah Gomez M.D.    PCP: Laila Heaton MD

## 2020-02-14 ENCOUNTER — OFFICE VISIT (OUTPATIENT)
Dept: SURGERY | Age: 85
End: 2020-02-14

## 2020-02-14 VITALS
DIASTOLIC BLOOD PRESSURE: 75 MMHG | TEMPERATURE: 98.1 F | BODY MASS INDEX: 34.63 KG/M2 | RESPIRATION RATE: 17 BRPM | SYSTOLIC BLOOD PRESSURE: 188 MMHG | HEIGHT: 58 IN | HEART RATE: 81 BPM | WEIGHT: 165 LBS

## 2020-02-14 PROCEDURE — 99024 POSTOP FOLLOW-UP VISIT: CPT | Performed by: SURGERY

## 2020-07-01 ENCOUNTER — HOSPITAL ENCOUNTER (EMERGENCY)
Age: 85
Discharge: HOME | End: 2020-07-01
Attending: EMERGENCY MEDICINE
Payer: MEDICARE

## 2020-07-01 ENCOUNTER — APPOINTMENT (OUTPATIENT)
Dept: GENERAL RADIOLOGY | Age: 85
End: 2020-07-01
Payer: MEDICARE

## 2020-07-01 ENCOUNTER — APPOINTMENT (OUTPATIENT)
Dept: CT IMAGING | Age: 85
End: 2020-07-01
Payer: MEDICARE

## 2020-07-01 VITALS
OXYGEN SATURATION: 97 % | TEMPERATURE: 97.3 F | DIASTOLIC BLOOD PRESSURE: 74 MMHG | BODY MASS INDEX: 34.63 KG/M2 | WEIGHT: 165 LBS | SYSTOLIC BLOOD PRESSURE: 160 MMHG | RESPIRATION RATE: 18 BRPM | HEART RATE: 72 BPM | HEIGHT: 58 IN

## 2020-07-01 LAB
ALBUMIN SERPL-MCNC: 3.9 G/DL (ref 3.5–5.2)
ALP BLD-CCNC: 60 U/L (ref 35–104)
ALT SERPL-CCNC: 8 U/L (ref 0–32)
ANION GAP SERPL CALCULATED.3IONS-SCNC: 15 MMOL/L (ref 7–16)
AST SERPL-CCNC: 21 U/L (ref 0–31)
BACTERIA: NORMAL /HPF
BASOPHILS ABSOLUTE: 0.04 E9/L (ref 0–0.2)
BASOPHILS RELATIVE PERCENT: 0.3 % (ref 0–2)
BILIRUB SERPL-MCNC: 0.6 MG/DL (ref 0–1.2)
BILIRUBIN URINE: NEGATIVE
BLOOD, URINE: NEGATIVE
BUN BLDV-MCNC: 18 MG/DL (ref 8–23)
CALCIUM SERPL-MCNC: 8.9 MG/DL (ref 8.6–10.2)
CHLORIDE BLD-SCNC: 107 MMOL/L (ref 98–107)
CHP ED QC CHECK: YES
CLARITY: CLEAR
CO2: 18 MMOL/L (ref 22–29)
COLOR: YELLOW
CREAT SERPL-MCNC: 1 MG/DL (ref 0.5–1)
EOSINOPHILS ABSOLUTE: 0.06 E9/L (ref 0.05–0.5)
EOSINOPHILS RELATIVE PERCENT: 0.5 % (ref 0–6)
EPITHELIAL CELLS, UA: NORMAL /HPF
GFR AFRICAN AMERICAN: >60
GFR NON-AFRICAN AMERICAN: 52 ML/MIN/1.73
GLUCOSE BLD-MCNC: 256 MG/DL
GLUCOSE BLD-MCNC: 269 MG/DL (ref 74–99)
GLUCOSE URINE: NEGATIVE MG/DL
HCT VFR BLD CALC: 36.1 % (ref 34–48)
HEMOGLOBIN: 11.2 G/DL (ref 11.5–15.5)
IMMATURE GRANULOCYTES #: 0.09 E9/L
IMMATURE GRANULOCYTES %: 0.7 % (ref 0–5)
KETONES, URINE: NEGATIVE MG/DL
LACTIC ACID: 2 MMOL/L (ref 0.5–2.2)
LEUKOCYTE ESTERASE, URINE: NEGATIVE
LIPASE: 48 U/L (ref 13–60)
LYMPHOCYTES ABSOLUTE: 1.26 E9/L (ref 1.5–4)
LYMPHOCYTES RELATIVE PERCENT: 10.4 % (ref 20–42)
MCH RBC QN AUTO: 30.2 PG (ref 26–35)
MCHC RBC AUTO-ENTMCNC: 31 % (ref 32–34.5)
MCV RBC AUTO: 97.3 FL (ref 80–99.9)
METER GLUCOSE: 256 MG/DL (ref 74–99)
MONOCYTES ABSOLUTE: 0.57 E9/L (ref 0.1–0.95)
MONOCYTES RELATIVE PERCENT: 4.7 % (ref 2–12)
NEUTROPHILS ABSOLUTE: 10.1 E9/L (ref 1.8–7.3)
NEUTROPHILS RELATIVE PERCENT: 83.4 % (ref 43–80)
NITRITE, URINE: NEGATIVE
PDW BLD-RTO: 14.1 FL (ref 11.5–15)
PH UA: 5 (ref 5–9)
PLATELET # BLD: 287 E9/L (ref 130–450)
PMV BLD AUTO: 11.6 FL (ref 7–12)
POTASSIUM REFLEX MAGNESIUM: 4.2 MMOL/L (ref 3.5–5)
PROTEIN UA: NORMAL MG/DL
RBC # BLD: 3.71 E12/L (ref 3.5–5.5)
RBC UA: NORMAL /HPF (ref 0–2)
SODIUM BLD-SCNC: 140 MMOL/L (ref 132–146)
SPECIFIC GRAVITY UA: 1.02 (ref 1–1.03)
TOTAL PROTEIN: 6.7 G/DL (ref 6.4–8.3)
TROPONIN: <0.01 NG/ML (ref 0–0.03)
UROBILINOGEN, URINE: 0.2 E.U./DL
WBC # BLD: 12.1 E9/L (ref 4.5–11.5)
WBC UA: NORMAL /HPF (ref 0–5)

## 2020-07-01 PROCEDURE — 6360000002 HC RX W HCPCS: Performed by: STUDENT IN AN ORGANIZED HEALTH CARE EDUCATION/TRAINING PROGRAM

## 2020-07-01 PROCEDURE — 74177 CT ABD & PELVIS W/CONTRAST: CPT

## 2020-07-01 PROCEDURE — 6360000004 HC RX CONTRAST MEDICATION: Performed by: RADIOLOGY

## 2020-07-01 PROCEDURE — 85025 COMPLETE CBC W/AUTO DIFF WBC: CPT

## 2020-07-01 PROCEDURE — 80053 COMPREHEN METABOLIC PANEL: CPT

## 2020-07-01 PROCEDURE — 83690 ASSAY OF LIPASE: CPT

## 2020-07-01 PROCEDURE — 82962 GLUCOSE BLOOD TEST: CPT

## 2020-07-01 PROCEDURE — 96374 THER/PROPH/DIAG INJ IV PUSH: CPT

## 2020-07-01 PROCEDURE — 81001 URINALYSIS AUTO W/SCOPE: CPT

## 2020-07-01 PROCEDURE — 99284 EMERGENCY DEPT VISIT MOD MDM: CPT

## 2020-07-01 PROCEDURE — 83605 ASSAY OF LACTIC ACID: CPT

## 2020-07-01 PROCEDURE — 84484 ASSAY OF TROPONIN QUANT: CPT

## 2020-07-01 PROCEDURE — 2580000003 HC RX 258: Performed by: STUDENT IN AN ORGANIZED HEALTH CARE EDUCATION/TRAINING PROGRAM

## 2020-07-01 PROCEDURE — 71045 X-RAY EXAM CHEST 1 VIEW: CPT

## 2020-07-01 RX ORDER — DICYCLOMINE HYDROCHLORIDE 10 MG/1
20 CAPSULE ORAL 4 TIMES DAILY PRN
Qty: 20 CAPSULE | Refills: 0 | Status: SHIPPED | OUTPATIENT
Start: 2020-07-01 | End: 2021-06-12 | Stop reason: SDUPTHER

## 2020-07-01 RX ORDER — ONDANSETRON 2 MG/ML
4 INJECTION INTRAMUSCULAR; INTRAVENOUS ONCE
Status: COMPLETED | OUTPATIENT
Start: 2020-07-01 | End: 2020-07-01

## 2020-07-01 RX ORDER — ONDANSETRON 4 MG/1
4 TABLET, ORALLY DISINTEGRATING ORAL EVERY 8 HOURS PRN
Qty: 10 TABLET | Refills: 0 | Status: SHIPPED | OUTPATIENT
Start: 2020-07-01 | End: 2021-09-23 | Stop reason: ALTCHOICE

## 2020-07-01 RX ORDER — 0.9 % SODIUM CHLORIDE 0.9 %
1000 INTRAVENOUS SOLUTION INTRAVENOUS ONCE
Status: COMPLETED | OUTPATIENT
Start: 2020-07-01 | End: 2020-07-01

## 2020-07-01 RX ADMIN — IOPAMIDOL 75 ML: 755 INJECTION, SOLUTION INTRAVENOUS at 09:39

## 2020-07-01 RX ADMIN — ONDANSETRON 4 MG: 2 INJECTION INTRAMUSCULAR; INTRAVENOUS at 08:39

## 2020-07-01 RX ADMIN — SODIUM CHLORIDE 1000 ML: 9 INJECTION, SOLUTION INTRAVENOUS at 08:39

## 2020-07-01 ASSESSMENT — ENCOUNTER SYMPTOMS
BACK PAIN: 0
DIARRHEA: 0
HEMATEMESIS: 0
SINUS PRESSURE: 0
EYE REDNESS: 0
ABDOMINAL PAIN: 1
COUGH: 0
NAUSEA: 1
EYE DISCHARGE: 0
EYE PAIN: 0
WHEEZING: 0
SHORTNESS OF BREATH: 0
ABDOMINAL DISTENTION: 0
VOMITING: 1
SORE THROAT: 0

## 2020-07-01 ASSESSMENT — PAIN DESCRIPTION - PAIN TYPE: TYPE: ACUTE PAIN

## 2020-07-01 ASSESSMENT — PAIN SCALES - GENERAL: PAINLEVEL_OUTOF10: 8

## 2020-07-01 ASSESSMENT — PAIN DESCRIPTION - LOCATION: LOCATION: ABDOMEN

## 2020-07-01 NOTE — ED PROVIDER NOTES
Chief Complaint   Patient presents with    Fatigue     diarrhea onset 2 days       Damian Simpler a 77-year-old female with a past medical history of diabetes who presents today for generalized fatigue, nausea, vomiting, diarrhea. Patient states yesterday she not feel herself, had a very low appetite and was fatigued. Patient states early this morning she woke up with nausea, vomiting, and diarrhea. Patient states she has had multiple loose watery stools, and multiple episodes of vomiting this morning. She is also endorsing generalized abdominal cramping. State is not made better or worse by anything specific. She denies recent sick contacts. Patient lives at home with her daughter, states no one else at home has been sick. She denies recent travel. Patient's pain is a generalized cramping sensation in the abdomen, not worse in one specific spot. This pain is not made better or worse by anything specific. Patient also endorses dysuria during this time. The history is provided by the patient. Abdominal Pain   Pain location:  Generalized  Pain quality: cramping    Pain radiates to:  Does not radiate  Pain severity:  Mild  Onset quality:  Gradual  Duration:  1 day  Timing:  Intermittent  Progression:  Waxing and waning  Chronicity:  New  Context: retching    Context: not eating and not sick contacts    Relieved by:  Nothing  Worsened by:  Nothing  Ineffective treatments:  None tried  Associated symptoms: anorexia, dysuria, fatigue, nausea and vomiting    Associated symptoms: no chest pain, no chills, no cough, no diarrhea, no fever, no hematemesis, no shortness of breath and no sore throat         Review of Systems   Constitutional: Positive for appetite change and fatigue. Negative for chills and fever. HENT: Negative for ear pain, sinus pressure and sore throat. Eyes: Negative for pain, discharge and redness. Respiratory: Negative for cough, shortness of breath and wheezing.     Cardiovascular: Negative for chest pain, palpitations and leg swelling. Gastrointestinal: Positive for abdominal pain, anorexia, nausea and vomiting. Negative for abdominal distention, diarrhea and hematemesis. Genitourinary: Positive for dysuria. Negative for frequency. Musculoskeletal: Negative for arthralgias and back pain. Skin: Negative for rash and wound. Neurological: Negative for dizziness, syncope, weakness and headaches. Hematological: Negative for adenopathy. Psychiatric/Behavioral: Negative for behavioral problems and confusion. All other systems reviewed and are negative. Physical Exam  Vitals signs and nursing note reviewed. Constitutional:       General: She is not in acute distress. Appearance: Normal appearance. She is well-developed. She is not ill-appearing. HENT:      Head: Normocephalic and atraumatic. Nose: Nose normal.      Mouth/Throat:      Mouth: Mucous membranes are moist.   Eyes:      Extraocular Movements: Extraocular movements intact. Pupils: Pupils are equal, round, and reactive to light. Neck:      Musculoskeletal: Normal range of motion and neck supple. Cardiovascular:      Rate and Rhythm: Normal rate and regular rhythm. Pulses: Normal pulses. Heart sounds: Normal heart sounds. No murmur. Pulmonary:      Effort: Pulmonary effort is normal. No respiratory distress. Breath sounds: Normal breath sounds. No wheezing or rales. Abdominal:      General: Bowel sounds are normal.      Palpations: Abdomen is soft. Tenderness: There is abdominal tenderness. There is no guarding or rebound. Comments: Generalized abdominal tenderness  Abdomen is soft   Musculoskeletal:      Right lower leg: No edema. Left lower leg: No edema. Skin:     General: Skin is warm and dry. Findings: No bruising or erythema. Neurological:      General: No focal deficit present. Mental Status: She is alert and oriented to person, place, and time. Cranial Nerves: No cranial nerve deficit. Coordination: Coordination normal.   Psychiatric:         Mood and Affect: Mood normal.         Behavior: Behavior normal.          Procedures     Labs Reviewed   CBC WITH AUTO DIFFERENTIAL - Abnormal; Notable for the following components:       Result Value    WBC 12.1 (*)     Hemoglobin 11.2 (*)     MCHC 31.0 (*)     Neutrophils % 83.4 (*)     Lymphocytes % 10.4 (*)     Neutrophils Absolute 10.10 (*)     Lymphocytes Absolute 1.26 (*)     All other components within normal limits   COMPREHENSIVE METABOLIC PANEL W/ REFLEX TO MG FOR LOW K - Abnormal; Notable for the following components:    CO2 18 (*)     Glucose 269 (*)     All other components within normal limits   POCT GLUCOSE - Abnormal; Notable for the following components:    Meter Glucose 256 (*)     All other components within normal limits   POCT GLUCOSE - Normal   LIPASE   URINALYSIS   TROPONIN   LACTIC ACID, PLASMA   MICROSCOPIC URINALYSIS     CT ABDOMEN PELVIS W IV CONTRAST Additional Contrast? None   Final Result   1. Scattered fluid in the small bowel, which is nonspecific but can be seen   with gastroenteritis. 2. Severe splenomegaly, slightly increased from the prior exam.  Moderate   hepatomegaly is unchanged. 3. 5 mm right superior pole renal calculus versus cortical calcification. 4. 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 XR CHEST PORTABLE   Final Result   No acute cardiopulmonary process. 6 mm nodular opacity in the left mid lung   zone, which may reflect a pulmonary vessel on end or could be indicative of a   nodule. RECOMMENDATION:   Correlation with CT of the chest is recommended on a nonemergent basis, to   exclude a true pulmonary nodule.              MDM  Number of Diagnoses or Management Options  Generalized abdominal pain:   Lung nodule:   Non-intractable vomiting with nausea, unspecified vomiting type:   Diagnosis management comments: Patient is a 20-year-old female presents today for nausea, vomiting and generalized abdominal pain. Has generalized abdominal tenderness to palpation, abdomen soft, no guarding or rebound noted. Manger physical is normal.  Lab work and imaging was obtained. Lab work is within normal limits, CT the abdomen shows fluid in the gallbladder which is likely gastroenteritis, they do note severe splenomegaly, however this is relatively unchanged from previous exams. Chest x-ray shows evidence of a 6 mm nodule, which I discussed with the patient, recommended that she follow-up with her PCP for further evaluation several months down the road. Patient's symptoms have been managed in the department with medication. Patient will be sent home with Zofran and Bentyl. Patient's vitals are stable for discharge home. This is likely viral in origin. Patient will follow-up with PCP. She understands and agrees with this plan. Amount and/or Complexity of Data Reviewed  Clinical lab tests: reviewed  Tests in the radiology section of CPT®: reviewed           ED Course as of Jul 01 1351   Wed Jul 01, 2020   0835 ATTENDING PROVIDER ATTESTATION:     I have personally performed and/or participated in the history, exam, medical decision making, and procedures and agree with all pertinent clinical information unless otherwise noted. I have also reviewed and agree with the past medical, family and social history unless otherwise noted. I have discussed this patient in detail with the resident and provided the instruction and education regarding the evidence-based evaluation and treatment of fatigue, diarrhea and abdominal discomfort. History: Patient reports last night, she began having diarrhea. Also mid abdominal discomfort and general fatigue. No shortness of breath or chest pain. No fever or chills. My findings: Myron Ruiz is a 80 y.o. female whom is in no distress. Physical exam reveals the patient is alert and oriented. Heart is regular with murmur. Lungs are decreased. Abdomen is soft there is some vague periumbilical tenderness which is difficult to localize. Skin is warm and dry. Extremities are intact with good strength and distal perfusion. My plan: Symptomatic and supportive care. X-ray, CT, labs. Electronically signed by Kristy Robbins DO on 7/1/20 at 8:35 AM EDT          [TG]      ED Course User Index  [TG] Kristy Robbins DO       --------------------------------------------- PAST HISTORY ---------------------------------------------  Past Medical History:  has a past medical history of Arthritis, Bowel obstruction ([facility] [address] Diabetes mellitus ([facility] [address] Diverticulosis, Hyperlipidemia, Hypertension, Irritable bowel syndrome, Peptic ulcer disease, Spinal stenosis, and Syncope and collapse. Past Surgical History:  has a past surgical history that includes Hysterectomy; Cholecystectomy; Tonsillectomy; Colon surgery; Appendectomy; Breast surgery; joint replacement; ECHO Compl W Dop Color Flow (4/22/2013); Thyroid surgery; Small intestine surgery; Knee arthroscopy; Total knee arthroplasty (Right); Colonoscopy; and pre-malignant / benign skin lesion excision (Left, 2/4/2020). Social History:  reports that she has never smoked. She has never used smokeless tobacco. She reports that she does not drink alcohol or use drugs. Family History: family history includes Cancer in her father. The patients home medications have been reviewed.     Allergies: Codeine and Morphine    -------------------------------------------------- RESULTS -------------------------------------------------  Labs:  Results for orders placed or performed during the hospital encounter of 07/01/20   CBC Auto Differential   Result Value Ref Range    WBC 12.1 (H) 4.5 - 11.5 E9/L    RBC 3.71 3.50 - 5.50 E12/L    Hemoglobin 11.2 (L) 11.5 - 15.5 g/dL    Hematocrit 36.1 34.0 - 48.0 %    MCV 97.3 80.0 - 99.9 fL    MCH 30.2 26.0 - 35.0 pg    MCHC 31.0 Urinalysis   Result Value Ref Range    WBC, UA NONE 0 - 5 /HPF    RBC, UA NONE 0 - 2 /HPF    Epithelial Cells, UA RARE /HPF    Bacteria, UA NONE SEEN None Seen /HPF   POCT Glucose   Result Value Ref Range    Glucose 256 mg/dL    QC OK? yes    POCT Glucose   Result Value Ref Range    Meter Glucose 256 (H) 74 - 99 mg/dL       Radiology:  CT ABDOMEN PELVIS W IV CONTRAST Additional Contrast? None   Final Result   1. Scattered fluid in the small bowel, which is nonspecific but can be seen   with gastroenteritis. 2. Severe splenomegaly, slightly increased from the prior exam.  Moderate   hepatomegaly is unchanged. 3. 5 mm right superior pole renal calculus versus cortical calcification. 4. 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 XR CHEST PORTABLE   Final Result   No acute cardiopulmonary process. 6 mm nodular opacity in the left mid lung   zone, which may reflect a pulmonary vessel on end or could be indicative of a   nodule. RECOMMENDATION:   Correlation with CT of the chest is recommended on a nonemergent basis, to   exclude a true pulmonary nodule.             ------------------------- NURSING NOTES AND VITALS REVIEWED ---------------------------  Date / Time Roomed:  7/1/2020  7:58 AM  ED Bed Assignment:  08/08    The nursing notes within the ED encounter and vital signs as below have been reviewed. BP (!) 160/74   Pulse 72   Temp 97.3 °F (36.3 °C) (Skin)   Resp 18   Ht 4' 10\" (1.473 m)   Wt 165 lb (74.8 kg)   SpO2 97%   BMI 34.49 kg/m²   Oxygen Saturation Interpretation: Normal      ------------------------------------------ PROGRESS NOTES ------------------------------------------  I have spoken with the patient and discussed todays results, in addition to providing specific details for the plan of care and counseling regarding the diagnosis and prognosis. Their questions are answered at this time and they are agreeable with the plan.  I discussed at length with them reasons for immediate return here for re evaluation. They will followup with primary care by calling their office tomorrow. --------------------------------- ADDITIONAL PROVIDER NOTES ---------------------------------  At this time the patient is without objective evidence of an acute process requiring hospitalization or inpatient management. They have remained hemodynamically stable throughout their entire ED visit and are stable for discharge with outpatient follow-up. The plan has been discussed in detail and they are aware of the specific conditions for emergent return, as well as the importance of follow-up. Discharge Medication List as of 7/1/2020 10:53 AM      START taking these medications    Details   ondansetron (ZOFRAN ODT) 4 MG disintegrating tablet Take 1 tablet by mouth every 8 hours as needed for Nausea or Vomiting May substitute for covered product, Disp-10 tablet, R-0Print      dicyclomine (BENTYL) 10 MG capsule Take 2 capsules by mouth 4 times daily as needed (abdominal pain), Disp-20 capsule, R-nonePrint             Diagnosis:  1. Non-intractable vomiting with nausea, unspecified vomiting type    2. Generalized abdominal pain    3. Lung nodule        Disposition:  Patient's disposition: Discharge to home  Patient's condition is stable.             Wicho Pederson DO  Resident  07/01/20 1194

## 2020-07-02 ENCOUNTER — CARE COORDINATION (OUTPATIENT)
Dept: CARE COORDINATION | Age: 85
End: 2020-07-02

## 2020-07-03 NOTE — CARE COORDINATION
Patient contacted regarding Nakita Moses. Discussed COVID-19 related testing which was not done at this time. Test results were not done. Patient informed of results, if available? n/a    Care Transition Nurse/ Ambulatory Care Manager contacted the patient by telephone to perform post discharge assessment. Verified name and  with patient as identifiers. Provided introduction to self, and explanation of the CTN/ACM role, and reason for call due to risk factors for infection and/or exposure to COVID-19. Symptoms reviewed with patient who verbalized the following symptoms: no new symptoms and no worsening symptoms. Called and spoke with pt this morning to f/u with her on her ED visit on 2020 for n/v/d. Pt states that symptoms have finally resolved. Pt states that she did have diarrhea all of this week and finally today she states her stool is more formed. Pt did  her new scripts and had been taking. Pt does have a f/u with her pcp on 2020. Pt declining Loop enrollment, but is okay with a f/u call in 10-14 days. Due to new onset of symptoms encounter was not routed to provider for escalation. Discussed follow-up appointments. If no appointment was previously scheduled, appointment scheduling offered: [facility] follow up appointment(s): No future appointments. Non-[facility] follow up appointment(s): Dr. Adriana Walsh 2020 @ 10:00 am      Patient has following risk factors of: diabetes and htn. CTN/ACM reviewed discharge instructions, medical action plan and red flags such as increased shortness of breath, increasing fever and signs of decompensation with patient who verbalized understanding. Discussed exposure protocols and quarantine with CDC Guidelines What to do if you are sick with coronavirus disease .  Patient was given an opportunity for questions and concerns.  The patient agrees to contact the [facility] exposure line [phone], local health department [place] [facility] of Health: ([phone]) and PCP office for questions related to their healthcare. CTN/ACM provided contact information for future needs. Reviewed and educated patient on any new and changed medications related to discharge diagnosis     Patient/family/caregiver given information for [facility] Loop and agrees to enroll no  Patient's preferred e-mail: declined   Patient's preferred phone number: declined  Based on Loop alert triggers, patient will be contacted by nurse care manager for worsening symptoms. f/u in 10-14 days based on severity of symptoms and risk factors.

## 2020-07-15 ENCOUNTER — CARE COORDINATION (OUTPATIENT)
Dept: CARE COORDINATION | Age: 85
End: 2020-07-15

## 2021-02-12 LAB
ALBUMIN SERPL-MCNC: NORMAL G/DL
ALP BLD-CCNC: NORMAL U/L
ALT SERPL-CCNC: NORMAL U/L
ANION GAP SERPL CALCULATED.3IONS-SCNC: NORMAL MMOL/L
AST SERPL-CCNC: NORMAL U/L
AVERAGE GLUCOSE: 140
BILIRUB SERPL-MCNC: NORMAL MG/DL
BUN BLDV-MCNC: NORMAL MG/DL
CALCIUM SERPL-MCNC: NORMAL MG/DL
CHLORIDE BLD-SCNC: NORMAL MMOL/L
CHOLESTEROL, TOTAL: 106 MG/DL
CHOLESTEROL/HDL RATIO: 4.2
CO2: NORMAL
CREAT SERPL-MCNC: NORMAL MG/DL
GFR CALCULATED: NORMAL
GLUCOSE BLD-MCNC: NORMAL MG/DL
HBA1C MFR BLD: 6.5 %
HDLC SERPL-MCNC: 25 MG/DL (ref 35–70)
LDL CHOLESTEROL CALCULATED: 43 MG/DL (ref 0–160)
LDL CHOLESTEROL DIRECT: ABNORMAL
POTASSIUM SERPL-SCNC: NORMAL MMOL/L
SODIUM BLD-SCNC: NORMAL MMOL/L
TOTAL PROTEIN: NORMAL
TRIGL SERPL-MCNC: 191 MG/DL
VLDLC SERPL CALC-MCNC: 38 MG/DL

## 2021-02-20 ENCOUNTER — HOSPITAL ENCOUNTER (OUTPATIENT)
Age: 86
Discharge: HOME | End: 2021-02-22
Payer: MEDICARE

## 2021-02-20 ENCOUNTER — HOSPITAL ENCOUNTER (OUTPATIENT)
Dept: GENERAL RADIOLOGY | Age: 86
Discharge: HOME | End: 2021-02-22
Payer: MEDICARE

## 2021-02-20 DIAGNOSIS — G89.29 CHRONIC LOW BACK PAIN, UNSPECIFIED BACK PAIN LATERALITY, UNSPECIFIED WHETHER SCIATICA PRESENT: ICD-10-CM

## 2021-02-20 DIAGNOSIS — M54.50 CHRONIC LOW BACK PAIN, UNSPECIFIED BACK PAIN LATERALITY, UNSPECIFIED WHETHER SCIATICA PRESENT: ICD-10-CM

## 2021-02-20 DIAGNOSIS — M25.552 LEFT HIP PAIN: ICD-10-CM

## 2021-02-20 DIAGNOSIS — M25.551 RIGHT HIP PAIN: ICD-10-CM

## 2021-02-20 PROCEDURE — 72220 X-RAY EXAM SACRUM TAILBONE: CPT

## 2021-02-20 PROCEDURE — 73502 X-RAY EXAM HIP UNI 2-3 VIEWS: CPT

## 2021-02-20 PROCEDURE — 72100 X-RAY EXAM L-S SPINE 2/3 VWS: CPT

## 2021-03-01 ENCOUNTER — APPOINTMENT (OUTPATIENT)
Dept: CT IMAGING | Age: 86
End: 2021-03-01
Payer: MEDICARE

## 2021-03-01 ENCOUNTER — HOSPITAL ENCOUNTER (EMERGENCY)
Age: 86
Discharge: HOME | End: 2021-03-01
Attending: EMERGENCY MEDICINE
Payer: MEDICARE

## 2021-03-01 VITALS
DIASTOLIC BLOOD PRESSURE: 76 MMHG | WEIGHT: 162 LBS | HEIGHT: 58 IN | OXYGEN SATURATION: 97 % | TEMPERATURE: 97.9 F | SYSTOLIC BLOOD PRESSURE: 193 MMHG | HEART RATE: 85 BPM | RESPIRATION RATE: 16 BRPM | BODY MASS INDEX: 34 KG/M2

## 2021-03-01 DIAGNOSIS — K59.00 CONSTIPATION, UNSPECIFIED CONSTIPATION TYPE: Primary | ICD-10-CM

## 2021-03-01 LAB
ALBUMIN SERPL-MCNC: 3.8 G/DL (ref 3.5–5.2)
ALP BLD-CCNC: 71 U/L (ref 35–104)
ALT SERPL-CCNC: 14 U/L (ref 0–32)
ANION GAP SERPL CALCULATED.3IONS-SCNC: 12 MMOL/L (ref 7–16)
APTT: 53.1 SEC (ref 24.5–35.1)
AST SERPL-CCNC: 18 U/L (ref 0–31)
BACTERIA: ABNORMAL /HPF
BASOPHILS ABSOLUTE: 0.03 E9/L (ref 0–0.2)
BASOPHILS RELATIVE PERCENT: 0.3 % (ref 0–2)
BILIRUB SERPL-MCNC: 0.3 MG/DL (ref 0–1.2)
BILIRUBIN URINE: NEGATIVE
BLOOD, URINE: ABNORMAL
BUN BLDV-MCNC: 18 MG/DL (ref 8–23)
CALCIUM SERPL-MCNC: 9.2 MG/DL (ref 8.6–10.2)
CHLORIDE BLD-SCNC: 108 MMOL/L (ref 98–107)
CLARITY: CLEAR
CO2: 19 MMOL/L (ref 22–29)
COLOR: YELLOW
CREAT SERPL-MCNC: 0.8 MG/DL (ref 0.5–1)
EOSINOPHILS ABSOLUTE: 0.08 E9/L (ref 0.05–0.5)
EOSINOPHILS RELATIVE PERCENT: 0.8 % (ref 0–6)
EPITHELIAL CELLS, UA: ABNORMAL /HPF
GFR AFRICAN AMERICAN: >60
GFR NON-AFRICAN AMERICAN: >60 ML/MIN/1.73
GLUCOSE BLD-MCNC: 240 MG/DL (ref 74–99)
GLUCOSE URINE: NEGATIVE MG/DL
HCT VFR BLD CALC: 39.6 % (ref 34–48)
HEMOGLOBIN: 12.8 G/DL (ref 11.5–15.5)
IMMATURE GRANULOCYTES #: 0.13 E9/L
IMMATURE GRANULOCYTES %: 1.3 % (ref 0–5)
INR BLD: 1.1
KETONES, URINE: NEGATIVE MG/DL
LEUKOCYTE ESTERASE, URINE: NEGATIVE
LYMPHOCYTES ABSOLUTE: 2.36 E9/L (ref 1.5–4)
LYMPHOCYTES RELATIVE PERCENT: 23.1 % (ref 20–42)
MCH RBC QN AUTO: 28 PG (ref 26–35)
MCHC RBC AUTO-ENTMCNC: 32.3 % (ref 32–34.5)
MCV RBC AUTO: 86.7 FL (ref 80–99.9)
MONOCYTES ABSOLUTE: 0.79 E9/L (ref 0.1–0.95)
MONOCYTES RELATIVE PERCENT: 7.7 % (ref 2–12)
NEUTROPHILS ABSOLUTE: 6.83 E9/L (ref 1.8–7.3)
NEUTROPHILS RELATIVE PERCENT: 66.8 % (ref 43–80)
NITRITE, URINE: NEGATIVE
PDW BLD-RTO: 14.5 FL (ref 11.5–15)
PH UA: 6 (ref 5–9)
PLATELET # BLD: 265 E9/L (ref 130–450)
PMV BLD AUTO: 11.7 FL (ref 7–12)
POTASSIUM SERPL-SCNC: 3.9 MMOL/L (ref 3.5–5)
PRO-BNP: 316 PG/ML (ref 0–450)
PROTEIN UA: 100 MG/DL
PROTHROMBIN TIME: 12.4 SEC (ref 9.3–12.4)
RBC # BLD: 4.57 E12/L (ref 3.5–5.5)
RBC UA: ABNORMAL /HPF (ref 0–2)
SODIUM BLD-SCNC: 139 MMOL/L (ref 132–146)
SPECIFIC GRAVITY UA: 1.02 (ref 1–1.03)
TOTAL PROTEIN: 6.6 G/DL (ref 6.4–8.3)
TROPONIN: <0.01 NG/ML (ref 0–0.03)
UROBILINOGEN, URINE: 0.2 E.U./DL
WBC # BLD: 10.2 E9/L (ref 4.5–11.5)
WBC UA: ABNORMAL /HPF (ref 0–5)

## 2021-03-01 PROCEDURE — 84484 ASSAY OF TROPONIN QUANT: CPT

## 2021-03-01 PROCEDURE — 74177 CT ABD & PELVIS W/CONTRAST: CPT

## 2021-03-01 PROCEDURE — 99283 EMERGENCY DEPT VISIT LOW MDM: CPT

## 2021-03-01 PROCEDURE — 71275 CT ANGIOGRAPHY CHEST: CPT

## 2021-03-01 PROCEDURE — 93005 ELECTROCARDIOGRAM TRACING: CPT | Performed by: EMERGENCY MEDICINE

## 2021-03-01 PROCEDURE — 6360000002 HC RX W HCPCS: Performed by: EMERGENCY MEDICINE

## 2021-03-01 PROCEDURE — 96374 THER/PROPH/DIAG INJ IV PUSH: CPT

## 2021-03-01 PROCEDURE — 85610 PROTHROMBIN TIME: CPT

## 2021-03-01 PROCEDURE — 85025 COMPLETE CBC W/AUTO DIFF WBC: CPT

## 2021-03-01 PROCEDURE — 81001 URINALYSIS AUTO W/SCOPE: CPT

## 2021-03-01 PROCEDURE — 83880 ASSAY OF NATRIURETIC PEPTIDE: CPT

## 2021-03-01 PROCEDURE — 80053 COMPREHEN METABOLIC PANEL: CPT

## 2021-03-01 PROCEDURE — 2580000003 HC RX 258: Performed by: EMERGENCY MEDICINE

## 2021-03-01 PROCEDURE — 6360000004 HC RX CONTRAST MEDICATION: Performed by: RADIOLOGY

## 2021-03-01 PROCEDURE — 85730 THROMBOPLASTIN TIME PARTIAL: CPT

## 2021-03-01 RX ORDER — 0.9 % SODIUM CHLORIDE 0.9 %
1000 INTRAVENOUS SOLUTION INTRAVENOUS ONCE
Status: COMPLETED | OUTPATIENT
Start: 2021-03-01 | End: 2021-03-01

## 2021-03-01 RX ORDER — MAGNESIUM CARB/ALUMINUM HYDROX 105-160MG
150 TABLET,CHEWABLE ORAL DAILY
Qty: 300 ML | Refills: 0 | Status: SHIPPED | OUTPATIENT
Start: 2021-03-01 | End: 2021-03-03

## 2021-03-01 RX ORDER — DOCUSATE SODIUM 100 MG/1
100 CAPSULE, LIQUID FILLED ORAL 2 TIMES DAILY PRN
Qty: 20 CAPSULE | Refills: 0 | Status: SHIPPED | OUTPATIENT
Start: 2021-03-01 | End: 2021-03-06

## 2021-03-01 RX ORDER — KETOROLAC TROMETHAMINE 30 MG/ML
15 INJECTION, SOLUTION INTRAMUSCULAR; INTRAVENOUS ONCE
Status: COMPLETED | OUTPATIENT
Start: 2021-03-01 | End: 2021-03-01

## 2021-03-01 RX ADMIN — IOPAMIDOL 75 ML: 755 INJECTION, SOLUTION INTRAVENOUS at 21:18

## 2021-03-01 RX ADMIN — KETOROLAC TROMETHAMINE 15 MG: 30 INJECTION, SOLUTION INTRAMUSCULAR; INTRAVENOUS at 20:32

## 2021-03-01 RX ADMIN — SODIUM CHLORIDE 1000 ML: 9 INJECTION, SOLUTION INTRAVENOUS at 22:08

## 2021-03-01 ASSESSMENT — PAIN DESCRIPTION - PAIN TYPE
TYPE: ACUTE PAIN
TYPE: ACUTE PAIN;CHRONIC PAIN

## 2021-03-01 ASSESSMENT — PAIN DESCRIPTION - LOCATION
LOCATION: FLANK;SHOULDER
LOCATION: FLANK

## 2021-03-01 ASSESSMENT — PAIN DESCRIPTION - FREQUENCY: FREQUENCY: CONTINUOUS

## 2021-03-01 ASSESSMENT — PAIN SCALES - GENERAL
PAINLEVEL_OUTOF10: 1
PAINLEVEL_OUTOF10: 5

## 2021-03-01 ASSESSMENT — PAIN DESCRIPTION - ORIENTATION: ORIENTATION: LEFT

## 2021-03-02 LAB
EKG ATRIAL RATE: 84 BPM
EKG P AXIS: 49 DEGREES
EKG P-R INTERVAL: 136 MS
EKG Q-T INTERVAL: 410 MS
EKG QRS DURATION: 90 MS
EKG QTC CALCULATION (BAZETT): 484 MS
EKG R AXIS: -29 DEGREES
EKG T AXIS: 67 DEGREES
EKG VENTRICULAR RATE: 84 BPM

## 2021-03-02 NOTE — ED NOTES
Discharge instructions reviewed, patient verbalized understanding.       Debbie France RN  03/01/21 [id number]

## 2021-03-02 NOTE — ED PROVIDER NOTES
any injuries or falls. Abdominal:      General: Bowel sounds are normal. There is no distension. Palpations: Abdomen is soft. There is no pulsatile mass. Tenderness: There is no abdominal tenderness. There is no right CVA tenderness, left CVA tenderness, guarding or rebound. Musculoskeletal:         General: No swelling, tenderness, deformity or signs of injury. Right lower leg: No edema. Left lower leg: No edema. Comments: Arms legs are neurovascular intact with no pretibial edema or calf pain. No cervical, thoracic or lumbar spine tenderness on palpation. Skin:     General: Skin is warm and dry. Coloration: Skin is not jaundiced or pale. Findings: No erythema or rash. Neurological:      General: No focal deficit present. Mental Status: She is alert and oriented to person, place, and time. GCS: GCS eye subscore is 4. GCS verbal subscore is 5. GCS motor subscore is 6. Cranial Nerves: No cranial nerve deficit. Coordination: Coordination normal.          Procedures     MDM           EKG Interpretation    Interpreted by emergency department physician    Rhythm: normal sinus   Rate: 84  Axis: normal  Ectopy: none  Conduction: normal  ST Segments: no acute change  T Waves: no acute change  Q Waves: none    Clinical Impression: no acute changes    Pau Garcia Cardinal           --------------------------------------------- PAST HISTORY ---------------------------------------------  Past Medical History:  has a past medical history of Arthritis, Bowel obstruction ([facility] [address] Diabetes mellitus ([facility] [address] Diverticulosis, Hyperlipidemia, Hypertension, Irritable bowel syndrome, Peptic ulcer disease, Spinal stenosis, and Syncope and collapse. Past Surgical History:  has a past surgical history that includes Hysterectomy; Cholecystectomy; Tonsillectomy; Colon surgery; Appendectomy; Breast surgery; joint replacement; ECHO Compl W Dop Color Flow (4/22/2013);  Thyroid surgery; Small intestine surgery; Knee arthroscopy; Total knee arthroplasty (Right); Colonoscopy; and pre-malignant / benign skin lesion excision (Left, 2/4/2020). Social History:  reports that she has never smoked. She has never used smokeless tobacco. She reports that she does not drink alcohol or use drugs. Family History: family history includes Cancer in her father. The patients home medications have been reviewed.     Allergies: Codeine and Morphine    -------------------------------------------------- RESULTS -------------------------------------------------  Labs:  Results for orders placed or performed during the hospital encounter of 03/01/21   CBC Auto Differential   Result Value Ref Range    WBC 10.2 4.5 - 11.5 E9/L    RBC 4.57 3.50 - 5.50 E12/L    Hemoglobin 12.8 11.5 - 15.5 g/dL    Hematocrit 39.6 34.0 - 48.0 %    MCV 86.7 80.0 - 99.9 fL    MCH 28.0 26.0 - 35.0 pg    MCHC 32.3 32.0 - 34.5 %    RDW 14.5 11.5 - 15.0 fL    Platelets 960 092 - 544 E9/L    MPV 11.7 7.0 - 12.0 fL    Neutrophils % 66.8 43.0 - 80.0 %    Immature Granulocytes % 1.3 0.0 - 5.0 %    Lymphocytes % 23.1 20.0 - 42.0 %    Monocytes % 7.7 2.0 - 12.0 %    Eosinophils % 0.8 0.0 - 6.0 %    Basophils % 0.3 0.0 - 2.0 %    Neutrophils Absolute 6.83 1.80 - 7.30 E9/L    Immature Granulocytes # 0.13 E9/L    Lymphocytes Absolute 2.36 1.50 - 4.00 E9/L    Monocytes Absolute 0.79 0.10 - 0.95 E9/L    Eosinophils Absolute 0.08 0.05 - 0.50 E9/L    Basophils Absolute 0.03 0.00 - 0.20 E9/L   Comprehensive Metabolic Panel   Result Value Ref Range    Sodium 139 132 - 146 mmol/L    Potassium 3.9 3.5 - 5.0 mmol/L    Chloride 108 (H) 98 - 107 mmol/L    CO2 19 (L) 22 - 29 mmol/L    Anion Gap 12 7 - 16 mmol/L    Glucose 240 (H) 74 - 99 mg/dL    BUN 18 8 - 23 mg/dL    CREATININE 0.8 0.5 - 1.0 mg/dL    GFR Non-African American >60 >=60 mL/min/1.73    GFR African American >60     Calcium 9.2 8.6 - 10.2 mg/dL    Total Protein 6.6 6.4 - 8.3 g/dL    Albumin 3.8 3.5 - 5.2 g/dL    Total Bilirubin 0.3 0.0 - 1.2 mg/dL    Alkaline Phosphatase 71 35 - 104 U/L    ALT 14 0 - 32 U/L    AST 18 0 - 31 U/L   Protime-INR   Result Value Ref Range    Protime 12.4 9.3 - 12.4 sec    INR 1.1    APTT   Result Value Ref Range    aPTT 53.1 (H) 24.5 - 35.1 sec   Brain Natriuretic Peptide   Result Value Ref Range    Pro- 0 - 450 pg/mL   Troponin   Result Value Ref Range    Troponin <0.01 0.00 - 0.03 ng/mL   Urinalysis   Result Value Ref Range    Color, UA Yellow Straw/Yellow    Clarity, UA Clear Clear    Glucose, Ur Negative Negative mg/dL    Bilirubin Urine Negative Negative    Ketones, Urine Negative Negative mg/dL    Specific Gravity, UA 1.020 1.005 - 1.030    Blood, Urine TRACE (A) Negative    pH, UA 6.0 5.0 - 9.0    Protein,  (A) Negative mg/dL    Urobilinogen, Urine 0.2 <2.0 E.U./dL    Nitrite, Urine Negative Negative    Leukocyte Esterase, Urine Negative Negative   Microscopic Urinalysis   Result Value Ref Range    WBC, UA 0-1 0 - 5 /HPF    RBC, UA 1-3 0 - 2 /HPF    Epithelial Cells, UA RARE /HPF    Bacteria, UA RARE (A) None Seen /HPF       Radiology:  CT ABDOMEN PELVIS W IV CONTRAST Additional Contrast? None   Final Result   There is no acute inflammation or bowel obstruction. There is constipation. Hepatosplenomegaly. Atelectasis and scarring in the lung bases. CTA CHEST W CONTRAST   Final Result   No evidence of pulmonary embolism or acute pulmonary abnormality. Emphysematous changes in the lungs with chronic interstitial changes. Trace effusion or thickening along the right major fissure. Coronary atherosclerotic calcifications.          ------------------------- NURSING NOTES AND VITALS REVIEWED ---------------------------  Date / Time Roomed:  3/1/2021  7:32 PM  ED Bed Assignment:  09/09    The nursing notes within the ED encounter and vital signs as below have been reviewed.    BP (!) 193/76   Pulse 85   Temp 97.9 °F (36.6 °C)   Resp 16   Ht 4' 10\" (1.473 m)   Wt 162 lb (73.5 kg)   SpO2 97%   BMI 33.86 kg/m²   Oxygen Saturation Interpretation: Normal      ------------------------------------------ PROGRESS NOTES ------------------------------------------  I have spoken with the patient and discussed todays results, in addition to providing specific details for the plan of care and counseling regarding the diagnosis and prognosis. Their questions are answered at this time and they are agreeable with the plan. I discussed at length with them reasons for immediate return here for re evaluation. They will followup with primary care by calling their office tomorrow. During discharge plan the patient does inform me that she was recently started on iron pills and has been little constipated with that since starting them.    --------------------------------- ADDITIONAL PROVIDER NOTES ---------------------------------  At this time the patient is without objective evidence of an acute process requiring hospitalization or inpatient management. They have remained hemodynamically stable throughout their entire ED visit and are stable for discharge with outpatient follow-up. The plan has been discussed in detail and they are aware of the specific conditions for emergent return, as well as the importance of follow-up. New Prescriptions    DOCUSATE SODIUM (COLACE) 100 MG CAPSULE    Take 1 capsule by mouth 2 times daily as needed for Constipation    MAGNESIUM CITRATE 1.745 GM/30ML SOLUTION    Take 150 mLs by mouth daily for 2 doses 150ML PO QD AS NEEDED FOR CONSTIPATION. Diagnosis:  1. Constipation, unspecified constipation type        Disposition:  Patient's disposition: Discharge to home  Patient's condition is stable.          Lucía Caicedo DO  03/01/21 [id number]

## 2021-03-03 ENCOUNTER — IMMUNIZATION (OUTPATIENT)
Dept: PRIMARY CARE CLINIC | Age: 86
End: 2021-03-03
Payer: MEDICARE

## 2021-03-03 PROCEDURE — 0001A COVID-19, PFIZER VACCINE 30MCG/0.3ML DOSE: CPT | Performed by: NURSE PRACTITIONER

## 2021-03-03 PROCEDURE — 91300 COVID-19, PFIZER VACCINE 30MCG/0.3ML DOSE: CPT | Performed by: NURSE PRACTITIONER

## 2021-03-19 ENCOUNTER — APPOINTMENT (OUTPATIENT)
Dept: CT IMAGING | Age: 86
End: 2021-03-19
Payer: MEDICARE

## 2021-03-19 ENCOUNTER — HOSPITAL ENCOUNTER (EMERGENCY)
Age: 86
Discharge: HOME | End: 2021-03-20
Attending: EMERGENCY MEDICINE
Payer: MEDICARE

## 2021-03-19 DIAGNOSIS — E83.42 HYPOMAGNESEMIA: ICD-10-CM

## 2021-03-19 DIAGNOSIS — E87.6 HYPOKALEMIA: ICD-10-CM

## 2021-03-19 DIAGNOSIS — E83.51 HYPOCALCEMIA: ICD-10-CM

## 2021-03-19 DIAGNOSIS — K57.32 DIVERTICULITIS OF COLON: ICD-10-CM

## 2021-03-19 DIAGNOSIS — R55 NEAR SYNCOPE: Primary | ICD-10-CM

## 2021-03-19 LAB
ALBUMIN SERPL-MCNC: 2.4 G/DL (ref 3.5–5.2)
ALP BLD-CCNC: 47 U/L (ref 35–104)
ALT SERPL-CCNC: 6 U/L (ref 0–32)
ANION GAP SERPL CALCULATED.3IONS-SCNC: 12 MMOL/L (ref 7–16)
AST SERPL-CCNC: 12 U/L (ref 0–31)
BACTERIA: ABNORMAL /HPF
BASOPHILS ABSOLUTE: 0.05 E9/L (ref 0–0.2)
BASOPHILS RELATIVE PERCENT: 0.5 % (ref 0–2)
BILIRUB SERPL-MCNC: 0.5 MG/DL (ref 0–1.2)
BILIRUBIN URINE: NEGATIVE
BLOOD, URINE: ABNORMAL
BUN BLDV-MCNC: 22 MG/DL (ref 8–23)
CALCIUM SERPL-MCNC: 6.4 MG/DL (ref 8.6–10.2)
CHLORIDE BLD-SCNC: 109 MMOL/L (ref 98–107)
CLARITY: CLEAR
CO2: 16 MMOL/L (ref 22–29)
COLOR: YELLOW
CREAT SERPL-MCNC: 1.2 MG/DL (ref 0.5–1)
EOSINOPHILS ABSOLUTE: 0.04 E9/L (ref 0.05–0.5)
EOSINOPHILS RELATIVE PERCENT: 0.4 % (ref 0–6)
GFR AFRICAN AMERICAN: 51
GFR NON-AFRICAN AMERICAN: 42 ML/MIN/1.73
GLUCOSE BLD-MCNC: 215 MG/DL (ref 74–99)
GLUCOSE URINE: NEGATIVE MG/DL
HCT VFR BLD CALC: 28.4 % (ref 34–48)
HEMOGLOBIN: 9.2 G/DL (ref 11.5–15.5)
IMMATURE GRANULOCYTES #: 0.16 E9/L
IMMATURE GRANULOCYTES %: 1.5 % (ref 0–5)
KETONES, URINE: NEGATIVE MG/DL
LEUKOCYTE ESTERASE, URINE: NEGATIVE
LYMPHOCYTES ABSOLUTE: 1.49 E9/L (ref 1.5–4)
LYMPHOCYTES RELATIVE PERCENT: 13.9 % (ref 20–42)
MAGNESIUM: 1.4 MG/DL (ref 1.6–2.6)
MCH RBC QN AUTO: 27.9 PG (ref 26–35)
MCHC RBC AUTO-ENTMCNC: 32.4 % (ref 32–34.5)
MCV RBC AUTO: 86.1 FL (ref 80–99.9)
MONOCYTES ABSOLUTE: 0.8 E9/L (ref 0.1–0.95)
MONOCYTES RELATIVE PERCENT: 7.4 % (ref 2–12)
NEUTROPHILS ABSOLUTE: 8.21 E9/L (ref 1.8–7.3)
NEUTROPHILS RELATIVE PERCENT: 76.3 % (ref 43–80)
NITRITE, URINE: NEGATIVE
PDW BLD-RTO: 14.7 FL (ref 11.5–15)
PH UA: 5.5 (ref 5–9)
PLATELET # BLD: 141 E9/L (ref 130–450)
PMV BLD AUTO: 11.8 FL (ref 7–12)
POTASSIUM SERPL-SCNC: 3.2 MMOL/L (ref 3.5–5)
PRO-BNP: 265 PG/ML (ref 0–450)
PROTEIN UA: NEGATIVE MG/DL
RBC # BLD: 3.3 E12/L (ref 3.5–5.5)
RBC UA: ABNORMAL /HPF (ref 0–2)
SODIUM BLD-SCNC: 137 MMOL/L (ref 132–146)
SPECIFIC GRAVITY UA: 1.01 (ref 1–1.03)
TOTAL PROTEIN: 4.5 G/DL (ref 6.4–8.3)
TROPONIN: <0.01 NG/ML (ref 0–0.03)
UROBILINOGEN, URINE: 0.2 E.U./DL
WBC # BLD: 10.8 E9/L (ref 4.5–11.5)
WBC UA: ABNORMAL /HPF (ref 0–5)

## 2021-03-19 PROCEDURE — 96366 THER/PROPH/DIAG IV INF ADDON: CPT

## 2021-03-19 PROCEDURE — 6360000004 HC RX CONTRAST MEDICATION: Performed by: RADIOLOGY

## 2021-03-19 PROCEDURE — 83880 ASSAY OF NATRIURETIC PEPTIDE: CPT

## 2021-03-19 PROCEDURE — 84484 ASSAY OF TROPONIN QUANT: CPT

## 2021-03-19 PROCEDURE — 96365 THER/PROPH/DIAG IV INF INIT: CPT

## 2021-03-19 PROCEDURE — 72125 CT NECK SPINE W/O DYE: CPT

## 2021-03-19 PROCEDURE — 85025 COMPLETE CBC W/AUTO DIFF WBC: CPT

## 2021-03-19 PROCEDURE — 36415 COLL VENOUS BLD VENIPUNCTURE: CPT

## 2021-03-19 PROCEDURE — 2580000003 HC RX 258: Performed by: EMERGENCY MEDICINE

## 2021-03-19 PROCEDURE — 81001 URINALYSIS AUTO W/SCOPE: CPT

## 2021-03-19 PROCEDURE — 6360000002 HC RX W HCPCS: Performed by: EMERGENCY MEDICINE

## 2021-03-19 PROCEDURE — 99283 EMERGENCY DEPT VISIT LOW MDM: CPT

## 2021-03-19 PROCEDURE — 70450 CT HEAD/BRAIN W/O DYE: CPT

## 2021-03-19 PROCEDURE — 93005 ELECTROCARDIOGRAM TRACING: CPT | Performed by: EMERGENCY MEDICINE

## 2021-03-19 PROCEDURE — 96368 THER/DIAG CONCURRENT INF: CPT

## 2021-03-19 PROCEDURE — 6370000000 HC RX 637 (ALT 250 FOR IP): Performed by: EMERGENCY MEDICINE

## 2021-03-19 PROCEDURE — 80053 COMPREHEN METABOLIC PANEL: CPT

## 2021-03-19 PROCEDURE — 71260 CT THORAX DX C+: CPT

## 2021-03-19 PROCEDURE — 83735 ASSAY OF MAGNESIUM: CPT

## 2021-03-19 PROCEDURE — 74177 CT ABD & PELVIS W/CONTRAST: CPT

## 2021-03-19 RX ORDER — 0.9 % SODIUM CHLORIDE 0.9 %
1000 INTRAVENOUS SOLUTION INTRAVENOUS ONCE
Status: COMPLETED | OUTPATIENT
Start: 2021-03-19 | End: 2021-03-20

## 2021-03-19 RX ORDER — MAGNESIUM SULFATE IN WATER 40 MG/ML
2000 INJECTION, SOLUTION INTRAVENOUS ONCE
Status: COMPLETED | OUTPATIENT
Start: 2021-03-19 | End: 2021-03-20

## 2021-03-19 RX ORDER — POTASSIUM CHLORIDE 20 MEQ/1
40 TABLET, EXTENDED RELEASE ORAL ONCE
Status: COMPLETED | OUTPATIENT
Start: 2021-03-19 | End: 2021-03-19

## 2021-03-19 RX ADMIN — POTASSIUM CHLORIDE 40 MEQ: 1500 TABLET, EXTENDED RELEASE ORAL at 22:23

## 2021-03-19 RX ADMIN — IOPAMIDOL 75 ML: 755 INJECTION, SOLUTION INTRAVENOUS at 21:45

## 2021-03-19 RX ADMIN — SODIUM CHLORIDE 1000 ML: 9 INJECTION, SOLUTION INTRAVENOUS at 23:35

## 2021-03-19 RX ADMIN — MAGNESIUM SULFATE HEPTAHYDRATE 2000 MG: 40 INJECTION, SOLUTION INTRAVENOUS at 22:23

## 2021-03-19 ASSESSMENT — PAIN DESCRIPTION - PAIN TYPE: TYPE: ACUTE PAIN

## 2021-03-19 ASSESSMENT — ENCOUNTER SYMPTOMS
CONSTIPATION: 1
SHORTNESS OF BREATH: 0
COLOR CHANGE: 0
BLOOD IN STOOL: 0
BACK PAIN: 0
NAUSEA: 1
VOMITING: 0
RHINORRHEA: 0
ABDOMINAL DISTENTION: 1
ABDOMINAL PAIN: 1
COUGH: 0

## 2021-03-19 ASSESSMENT — PAIN SCALES - GENERAL: PAINLEVEL_OUTOF10: 6

## 2021-03-19 ASSESSMENT — PAIN DESCRIPTION - DESCRIPTORS: DESCRIPTORS: CRAMPING

## 2021-03-19 ASSESSMENT — PAIN DESCRIPTION - LOCATION: LOCATION: ABDOMEN

## 2021-03-20 VITALS
SYSTOLIC BLOOD PRESSURE: 110 MMHG | DIASTOLIC BLOOD PRESSURE: 53 MMHG | RESPIRATION RATE: 16 BRPM | OXYGEN SATURATION: 96 % | HEART RATE: 60 BPM | TEMPERATURE: 97.5 F

## 2021-03-20 LAB
EKG ATRIAL RATE: 62 BPM
EKG P AXIS: 61 DEGREES
EKG P-R INTERVAL: 138 MS
EKG Q-T INTERVAL: 490 MS
EKG QRS DURATION: 90 MS
EKG QTC CALCULATION (BAZETT): 497 MS
EKG R AXIS: -28 DEGREES
EKG T AXIS: 61 DEGREES
EKG VENTRICULAR RATE: 62 BPM

## 2021-03-20 PROCEDURE — 2580000003 HC RX 258: Performed by: EMERGENCY MEDICINE

## 2021-03-20 PROCEDURE — 6360000002 HC RX W HCPCS: Performed by: EMERGENCY MEDICINE

## 2021-03-20 PROCEDURE — 93010 ELECTROCARDIOGRAM REPORT: CPT | Performed by: INTERNAL MEDICINE

## 2021-03-20 RX ORDER — MAG HYDROX/ALUMINUM HYD/SIMETH 400-400-40
1 SUSPENSION, ORAL (FINAL DOSE FORM) ORAL DAILY PRN
Qty: 1 SUPPOSITORY | Refills: 0 | Status: SHIPPED | OUTPATIENT
Start: 2021-03-20 | End: 2021-09-14

## 2021-03-20 RX ORDER — DOCUSATE SODIUM 100 MG/1
100 CAPSULE, LIQUID FILLED ORAL 2 TIMES DAILY
Qty: 14 CAPSULE | Refills: 0 | Status: SHIPPED | OUTPATIENT
Start: 2021-03-20 | End: 2021-03-27

## 2021-03-20 RX ORDER — AMOXICILLIN AND CLAVULANATE POTASSIUM 875; 125 MG/1; MG/1
1 TABLET, FILM COATED ORAL 2 TIMES DAILY
Qty: 20 TABLET | Refills: 0 | Status: SHIPPED | OUTPATIENT
Start: 2021-03-20 | End: 2021-03-30

## 2021-03-20 RX ADMIN — CALCIUM GLUCONATE 1000 MG: 98 INJECTION, SOLUTION INTRAVENOUS at 00:17

## 2021-03-20 NOTE — ED NOTES
Pt ambulated without difficulty. Pt and family member state she ambulated at baseline.       Anisa oWodall RN  03/20/21 2800

## 2021-03-20 NOTE — ED PROVIDER NOTES
ED PROVIDER NOTE    Chief Complaint   Patient presents with    Abdominal Pain     2 days. not localized. hypopactive bowel sounds.  Fall    Rib Injury     left side rib pain r/t fall limpact       HPI:  3/19/21,   Time: 8:16 PM EDT       Prosper Wong is a 80 y.o. female presenting to the ED for fall and abdominal pain. Fall occurred shortly prior to arrival, preceded by lightheadedness and syncope, fell and hit tub with left side of torso. No LOC. Not on anticoagulants. Since the fall L lower chest wall pain, constant, throbbing, worse w/ movement, no associated shortness of breath. Has had generalized abdominal pain, nausea, constipation for the past few days. No fever, chills, vomiting. No chest pain prior to the fall. No black/bloody stools. No palpitations. Chart review: hx of HTN, HLD, DM, SBO, diverticulosis, PUD    Review of Systems:     Review of Systems   Constitutional: Negative for appetite change, chills and fever. HENT: Negative for congestion and rhinorrhea. Eyes: Negative for visual disturbance. Respiratory: Negative for cough and shortness of breath. Cardiovascular: Positive for chest pain. Gastrointestinal: Positive for abdominal distention, abdominal pain, constipation and nausea. Negative for blood in stool and vomiting. Genitourinary: Negative for decreased urine volume and difficulty urinating. Musculoskeletal: Negative for back pain and neck pain. Skin: Negative for color change.    Neurological: Negative for dizziness, syncope, weakness, light-headedness, numbness and headaches.         --------------------------------------------- PAST HISTORY ---------------------------------------------  Past Medical History:   Past Medical History:   Diagnosis Date    Arthritis     [facility]el obstruction ([facility]     Diabetes mellitus ([facility]     Diverticulosis     Hyperlipidemia     Hypertension     Irritable bowel syndrome     Peptic ulcer disease     Spinal stenosis     Syncope and collapse 2013    admitted to [facility] due to [facility] TIA       Past Surgical History:   Past Surgical History:   Procedure Laterality Date    APPENDECTOMY      BREAST SURGERY      CHOLECYSTECTOMY      COLON SURGERY      COLONOSCOPY      ECHO COMPL W DOP COLOR FLOW  4/22/2013         HYSTERECTOMY      JOINT REPLACEMENT      KNEE ARTHROSCOPY      left knee    PRE-MALIGNANT / BENIGN SKIN LESION EXCISION Left 2/4/2020    EXCISION OF LEFT SCALP CYST performed by Pily Canales MD at [place] [id number]      obstructed bowel    THYROID SURGERY      goiter removed    TONSILLECTOMY      TOTAL KNEE ARTHROPLASTY Right        Social History:   Social History     Socioeconomic History    Marital status:       Spouse name: None    Number of children: None    Years of education: None    Highest education level: None   Occupational History    None   Social Needs    Financial resource strain: None    Food insecurity     Worry: None     Inability: None    Transportation needs     Medical: None     Non-medical: None   Tobacco Use    Smoking status: Never Smoker    Smokeless tobacco: Never Used   Substance and Sexual Activity    Alcohol use: No    Drug use: No    Sexual activity: Never   Lifestyle    Physical activity     Days per week: None     Minutes per session: None    Stress: None   Relationships    Social connections     Talks on phone: None     Gets together: None     Attends Scientologist service: None     Active member of club or organization: None     Attends meetings of clubs or organizations: None     Relationship status: None    Intimate partner violence     Fear of current or ex partner: None     Emotionally abused: None     Physically abused: None     Forced sexual activity: None   Other Topics Concern    None   Social History Narrative    None       Family History:   Family History   Problem Relation Age of Onset    Cancer Father        The patients home medications have been reviewed. Allergies: Allergies   Allergen Reactions    Codeine Other (See Comments)     Patient states it makes her want to climb the wall.  Morphine Other (See Comments)     Makes her feel goofy. ---------------------------------------------------PHYSICAL EXAM--------------------------------------    BP (!) 109/51   Pulse 65   Temp 97.5 °F (36.4 °C) (Oral)   Resp 16   SpO2 95%     Physical Exam  Constitutional:       General: She is not in acute distress. Appearance: She is not toxic-appearing. HENT:      Mouth/Throat:      Mouth: Mucous membranes are moist.   Eyes:      General: No scleral icterus. Extraocular Movements: Extraocular movements intact. Pupils: Pupils are equal, round, and reactive to light. Neck:      Musculoskeletal: Normal range of motion and neck supple. Comments: No midline ttp/stepoff/deformity  Cardiovascular:      Rate and Rhythm: Normal rate and regular rhythm. Pulses: Normal pulses. Heart sounds: Normal heart sounds. No murmur. Pulmonary:      Effort: Pulmonary effort is normal. No respiratory distress. Breath sounds: Normal breath sounds. No wheezing or rales. Chest:      Chest wall: Tenderness (L lower chest wall. no deformity) present. Abdominal:      General: There is distension. Palpations: Abdomen is soft. Tenderness: There is abdominal tenderness (mild diffuse nonfocal). There is no guarding or rebound. Musculoskeletal: Normal range of motion. General: No swelling, tenderness or deformity. Comments: Radial, DP, and PT pulses intact bilaterally. Skin:     General: Skin is warm and dry. Neurological:      Mental Status: She is alert and oriented to person, place, and time.       Comments: Strength 5/5 and sensation grossly intact to light touch and equal bilaterally throughout all extremities            -------------------------------------------------- RESULTS -------------------------------------------------  I have personally reviewed all laboratory and imaging results for this patient. Results are listed below. LABS:  Labs Reviewed   CBC WITH AUTO DIFFERENTIAL - Abnormal; Notable for the following components:       Result Value    RBC 3.30 (*)     Hemoglobin 9.2 (*)     Hematocrit 28.4 (*)     Lymphocytes % 13.9 (*)     Neutrophils Absolute 8.21 (*)     Lymphocytes Absolute 1.49 (*)     Eosinophils Absolute 0.04 (*)     All other components within normal limits   COMPREHENSIVE METABOLIC PANEL - Abnormal; Notable for the following components:    Potassium 3.2 (*)     Chloride 109 (*)     CO2 16 (*)     Glucose 215 (*)     CREATININE 1.2 (*)     Calcium 6.4 (*)     Total Protein 4.5 (*)     Albumin 2.4 (*)     All other components within normal limits   MAGNESIUM - Abnormal; Notable for the following components:    Magnesium 1.4 (*)     All other components within normal limits   URINALYSIS WITH MICROSCOPIC - Abnormal; Notable for the following components:    Blood, Urine TRACE (*)     All other components within normal limits   TROPONIN   BRAIN NATRIURETIC PEPTIDE   POCT OCCULT BLOOD STOOL COLON CA SCREEN 1-3       RADIOLOGY:  Interpreted personally and by Radiologist.  CT Head WO Contrast   Final Result   No acute intracranial abnormality. Chronic findings, unchanged since the   prior examination. CT Cervical Spine WO Contrast   Final Result   Severe degenerate changes. No acute fracture traumatic malalignment. CT CHEST W CONTRAST   Final Result   CT chest:      Chronic appearing findings appearing similar to previous. Tiny right middle lobe nodule appear stable and could be followed as   indicated per Fleischner society criteria. Questionable right 8th rib fracture versus artifact. Recommend correlation   with clinical exam for point tenderness.       CT abdomen and pelvis:      Mild inflammatory changes in the central upper pelvis adjacent to a small   bowel loop which could be related to a diverticulum and thus, consider mild   diverticulitis versus other infectious/inflammatory process. Short-term   follow-up if symptoms persist.      Other chronic appearing findings. Splenomegaly is similar to previous. CT ABDOMEN PELVIS W IV CONTRAST Additional Contrast? None   Final Result   CT chest:      Chronic appearing findings appearing similar to previous. Tiny right middle lobe nodule appear stable and could be followed as   indicated per Fleischner society criteria. Questionable right 8th rib fracture versus artifact. Recommend correlation   with clinical exam for point tenderness. CT abdomen and pelvis:      Mild inflammatory changes in the central upper pelvis adjacent to a small   bowel loop which could be related to a diverticulum and thus, consider mild   diverticulitis versus other infectious/inflammatory process. Short-term   follow-up if symptoms persist.      Other chronic appearing findings. Splenomegaly is similar to previous. EKG:  This EKG is signed and interpreted by the EP. Normal sinus rhythm, vent rate 62bpm, normal axis, prolonged QT, ST-T changes in lateral leads similar to prior EKG      ------------------------- NURSING NOTES AND VITALS REVIEWED ---------------------------   The nursing notes within the ED encounter and vital signs as below have been reviewed by myself. BP (!) 109/51   Pulse 65   Temp 97.5 °F (36.4 °C) (Oral)   Resp 16   SpO2 95%   Oxygen Saturation Interpretation: Normal    The patients available past medical records and past encounters were reviewed.         ------------------------------ ED COURSE/MEDICAL DECISION MAKING----------------------  Medications   calcium gluconate 1,000 mg in dextrose 5 % 100 mL IVPB (1,000 mg Intravenous New Bag 3/20/21 0017)   potassium chloride (KLOR-CON M) extended release tablet 40 mEq (40 mEq Oral Given 3/19/21 2223)   magnesium sulfate 2000 mg in 50 mL IVPB premix (2,000 mg Intravenous New Bag 3/19/21 2223)   iopamidol (ISOVUE-370) 76 % injection 75 mL (75 mLs Intravenous Given 3/19/21 2145)   0.9 % sodium chloride bolus (1,000 mLs Intravenous New Bag 3/19/21 2335)     Counseling: The emergency provider has spoken with the patient and family and discussed todays results, in addition to providing specific details for the plan of care and counseling regarding the diagnosis and prognosis. Questions are answered at this time and they are agreeable with the plan. ED Course/Medical Decision Makin y.o. female here with lightheadedness and fall. Non-toxic appearing, afebrile, hemodynamically stable, and in no acute distress. BP and hemoglobin decreased from prior baseline. No active GI bleed, guaiac negative brown stool on exam. No LOC with fall but had lightheadedness preceding it. Workup notable for hypokalemia, hypomagnesemia, hypocalcemia (corrected for albumin). Questionable diverticulitis on CT, otherwise no acute findings. At time of sign out to Dr Tommy Hall patient receiving electrolyte repletion and IV fluids. Discussed w/ family and patient who do prefer to go home and f/u with PCP as outpatient. After IV infusions complete, will repeat vitals, check orthostatic vital signs, and ensure that patient can ambulate w/ assistance as she does at baseline.        --------------------------------- IMPRESSION AND DISPOSITION ---------------------------------    IMPRESSION  1. Near syncope    2. Diverticulitis of colon    3. Hypomagnesemia    4. Hypocalcemia    5. Hypokalemia        DISPOSITION  Disposition: pending reevaluation, ambulation, and orthostatic vitals  Patient condition is stable    NOTE: This report was transcribed using voice recognition software.  Every effort was made to ensure accuracy; however, inadvertent computerized transcription errors may be present    Salma Arriaga MD  Attending Emergency Physician          Tobin De Los Santos MD  03/20/21 5613

## 2021-03-20 NOTE — ED NOTES
Bed: H2  Expected date:   Expected time:   Means of arrival:   Comments:  Patient in [address] [place] [address]  03/19/21 1522

## 2021-03-20 NOTE — ED NOTES
Name: Francine Garza  : 3/26/1928  MRN: [id number]    Date: 3/19/2021    Benefits of immediately proceeding with Radiology exam outweigh the risks and therefore the following is being waived:      [] Pregnancy test    [] Protocol for Iodine allergy    [] MRI questionnaire    [x] BUN/Creatinine        MD Grazyna Tamez MD  21 [id number]

## 2021-03-30 ENCOUNTER — IMMUNIZATION (OUTPATIENT)
Dept: PRIMARY CARE CLINIC | Age: 86
End: 2021-03-30
Payer: MEDICARE

## 2021-03-30 PROCEDURE — 91300 COVID-19, PFIZER VACCINE 30MCG/0.3ML DOSE: CPT | Performed by: NURSE PRACTITIONER

## 2021-03-30 PROCEDURE — 0002A COVID-19, PFIZER VACCINE 30MCG/0.3ML DOSE: CPT | Performed by: NURSE PRACTITIONER

## 2021-04-06 RX ORDER — GLIMEPIRIDE 4 MG/1
4 TABLET ORAL
Qty: 90 TABLET | Refills: 0 | Status: SHIPPED
Start: 2021-04-06 | End: 2021-04-12 | Stop reason: DRUGHIGH

## 2021-04-06 RX ORDER — AMLODIPINE BESYLATE 5 MG/1
5 TABLET ORAL DAILY
Qty: 90 TABLET | Refills: 0 | Status: SHIPPED
Start: 2021-04-06 | Stop reason: SDUPTHER

## 2021-04-06 RX ORDER — LOSARTAN POTASSIUM 100 MG/1
100 TABLET ORAL DAILY
Qty: 90 TABLET | Refills: 0 | Status: SHIPPED
Start: 2021-04-06 | Stop reason: SDUPTHER

## 2021-04-09 VITALS
RESPIRATION RATE: 16 BRPM | HEART RATE: 74 BPM | SYSTOLIC BLOOD PRESSURE: 140 MMHG | WEIGHT: 159.5 LBS | OXYGEN SATURATION: 93 % | DIASTOLIC BLOOD PRESSURE: 70 MMHG | TEMPERATURE: 97.5 F | HEIGHT: 59 IN | BODY MASS INDEX: 32.16 KG/M2

## 2021-04-09 RX ORDER — TIZANIDINE 4 MG/1
4 TABLET ORAL EVERY EVENING
COMMUNITY
End: 2021-08-23

## 2021-04-09 RX ORDER — PSYLLIUM SEED (WITH DEXTROSE)
1 POWDER (GRAM) ORAL 2 TIMES DAILY PRN
COMMUNITY

## 2021-04-09 RX ORDER — FENOFIBRATE 160 MG/1
160 TABLET ORAL DAILY
COMMUNITY
End: 2021-07-08 | Stop reason: SDUPTHER

## 2021-04-09 RX ORDER — NAPROXEN SODIUM 220 MG
220 TABLET ORAL 2 TIMES DAILY PRN
COMMUNITY
End: 2021-09-23 | Stop reason: ALTCHOICE

## 2021-04-09 RX ORDER — FERROUS SULFATE 325(65) MG
162.5 TABLET ORAL EVERY OTHER DAY
COMMUNITY

## 2021-04-12 RX ORDER — GLIMEPIRIDE 2 MG/1
2 TABLET ORAL
COMMUNITY
End: 2021-04-12 | Stop reason: SDUPTHER

## 2021-04-12 RX ORDER — GLIMEPIRIDE 2 MG/1
2 TABLET ORAL
Qty: 90 TABLET | Refills: 0 | Status: SHIPPED
Start: 2021-04-12 | Stop reason: SDUPTHER

## 2021-05-06 ENCOUNTER — OFFICE VISIT (OUTPATIENT)
Dept: PRIMARY CARE CLINIC | Age: 86
End: 2021-05-06
Payer: MEDICARE

## 2021-05-06 VITALS
BODY MASS INDEX: 32.25 KG/M2 | WEIGHT: 160 LBS | SYSTOLIC BLOOD PRESSURE: 160 MMHG | OXYGEN SATURATION: 96 % | HEIGHT: 59 IN | TEMPERATURE: 97.6 F | DIASTOLIC BLOOD PRESSURE: 84 MMHG | HEART RATE: 86 BPM

## 2021-05-06 DIAGNOSIS — Z79.4 CONTROLLED TYPE 2 DIABETES MELLITUS WITH DIABETIC NEUROPATHY, WITH LONG-TERM CURRENT USE OF INSULIN (HCC): ICD-10-CM

## 2021-05-06 DIAGNOSIS — E11.40 CONTROLLED TYPE 2 DIABETES MELLITUS WITH DIABETIC NEUROPATHY, WITH LONG-TERM CURRENT USE OF INSULIN (HCC): ICD-10-CM

## 2021-05-06 DIAGNOSIS — I10 ESSENTIAL HYPERTENSION, BENIGN: ICD-10-CM

## 2021-05-06 DIAGNOSIS — R29.6 FALL IN ELDERLY PATIENT: ICD-10-CM

## 2021-05-06 DIAGNOSIS — K58.1 IRRITABLE BOWEL SYNDROME WITH CONSTIPATION: Primary | ICD-10-CM

## 2021-05-06 PROCEDURE — 99213 OFFICE O/P EST LOW 20 MIN: CPT | Performed by: INTERNAL MEDICINE

## 2021-05-06 RX ORDER — LANCETS 33 GAUGE
EACH MISCELLANEOUS
COMMUNITY
Start: 2021-02-22 | End: 2021-09-23 | Stop reason: ALTCHOICE

## 2021-05-06 RX ORDER — PLECANATIDE 3 MG/1
1 TABLET ORAL DAILY
Qty: 30 TABLET | Refills: 1 | Status: SHIPPED
Start: 2021-05-06 | End: 2021-06-21

## 2021-05-06 RX ORDER — DOCUSATE SODIUM 100 MG/1
2 CAPSULE ORAL DAILY
COMMUNITY
Start: 2021-03-20 | End: 2021-05-24

## 2021-05-06 RX ORDER — LANCETS 30 GAUGE
EACH MISCELLANEOUS DAILY
COMMUNITY
End: 2021-09-23 | Stop reason: ALTCHOICE

## 2021-05-06 RX ORDER — BLOOD SUGAR DIAGNOSTIC
STRIP MISCELLANEOUS
COMMUNITY
Start: 2021-02-22 | End: 2021-09-23 | Stop reason: ALTCHOICE

## 2021-05-06 SDOH — ECONOMIC STABILITY: FOOD INSECURITY: WITHIN THE PAST 12 MONTHS, YOU WORRIED THAT YOUR FOOD WOULD RUN OUT BEFORE YOU GOT MONEY TO BUY MORE.: NEVER TRUE

## 2021-05-06 SDOH — ECONOMIC STABILITY: TRANSPORTATION INSECURITY
IN THE PAST 12 MONTHS, HAS LACK OF TRANSPORTATION KEPT YOU FROM MEETINGS, WORK, OR FROM GETTING THINGS NEEDED FOR DAILY LIVING?: NOT ASKED

## 2021-05-06 SDOH — ECONOMIC STABILITY: FOOD INSECURITY: WITHIN THE PAST 12 MONTHS, THE FOOD YOU BOUGHT JUST DIDN'T LAST AND YOU DIDN'T HAVE MONEY TO GET MORE.: NEVER TRUE

## 2021-05-06 SDOH — ECONOMIC STABILITY: TRANSPORTATION INSECURITY
IN THE PAST 12 MONTHS, HAS THE LACK OF TRANSPORTATION KEPT YOU FROM MEDICAL APPOINTMENTS OR FROM GETTING MEDICATIONS?: NOT ASKED

## 2021-05-06 ASSESSMENT — PATIENT HEALTH QUESTIONNAIRE - PHQ9
SUM OF ALL RESPONSES TO PHQ QUESTIONS 1-9: 0
SUM OF ALL RESPONSES TO PHQ QUESTIONS 1-9: 0
2. FEELING DOWN, DEPRESSED OR HOPELESS: 0
SUM OF ALL RESPONSES TO PHQ9 QUESTIONS 1 & 2: 0
SUM OF ALL RESPONSES TO PHQ QUESTIONS 1-9: 0

## 2021-05-06 NOTE — PROGRESS NOTES
Chief Complaint   Patient presents with   [facility] ED Follow-up     Pt. is here as an ER F/U from 2 visits in March, with no recent exposure to Covid. Pt. had extreme abdominal pain, and a bowel blockage was found. CTA and CT's are on file from March, along with labs. Self glucose this am of  251. HPI:  Patient is here for follow-up. Patient complains of ongoing constipation bloating generalized abdominal pain relieved with bowel movement she stated that she takes 2 laxatives over-the-counter prescribed to her by the emergency room. Patient fell twice in the last 2-month was evaluated in the ER no fractures but she had bruises on the left lower rib cage that has healed. No hypoglycemic episodes compliant on medications    Past Medical History, Surgical History, and Family History has been reviewed and updated.     Review of Systems:  Constitutional:  No fever, no fatigue, no chills, no headaches, no weight change  Dermatology:  No rash, no mole, no dry or sensitive skin  ENT:  No cough, no sore throat, no sinus pain, no runny nose, no ear pain  Cardiology:  No chest pain, no palpitations, no leg edema, no shortness of breath, no PND  Gastroenterology:  No dysphagia, no abdominal pain, no nausea, no vomiting, no constipation, no diarrhea, no heartburn  Musculoskeletal:  No joint pain, no leg cramps, no back pain, no muscle aches  Respiratory:  No shortness of breath, no orthopnea, no wheezing, no LIZAMA, no hemoptysis  Urology:  No blood in the urine, no urinary frequency, no urinary incontinence, no urinary urgency, no nocturia, no dysuria    Vitals:    05/06/21 0847   BP: (!) 160/84   Pulse: 86   Temp: 97.6 °F (36.4 °C)   TempSrc: Temporal   SpO2: 96%   Weight: 160 lb (72.6 kg)   Height: 4' 11.4\" (1.509 m)       General:  Patient alert and oriented x 3, NAD, pleasant  HEENT:  Atraumatic, normocephalic, PERRLA, EOMI, clear conjunctiva, TMs clear, nose-clear, throat - no erythema  Neck:  Supple, no goiter, no carotid bruits, no LAD  Lungs:  CTA   Heart:  RRR, no murmurs, gallops or rubs  Abdomen:  Soft/nt/nd, + bowel sounds  Extremities:  No clubbing, cyanosis or edema  Skin: unremarkable    Hemoglobin A1C   Date Value Ref Range Status   02/12/2021 6.5 % Final     Cholesterol, Total   Date Value Ref Range Status   02/12/2021 106 mg/dL Final     Triglycerides   Date Value Ref Range Status   02/12/2021 191 mg/dL Final     HDL   Date Value Ref Range Status   02/12/2021 25 (A) 35 - 70 mg/dL Final     VLDL   Date Value Ref Range Status   02/12/2021 38 mg/dL Final     Chol/HDL Ratio   Date Value Ref Range Status   02/12/2021 4.2  Final     Sodium   Date Value Ref Range Status   03/19/2021 137 132 - 146 mmol/L Final     Potassium   Date Value Ref Range Status   03/19/2021 3.2 (L) 3.5 - 5.0 mmol/L Final     Chloride   Date Value Ref Range Status   03/19/2021 109 (H) 98 - 107 mmol/L Final     CO2   Date Value Ref Range Status   03/19/2021 16 (L) 22 - 29 mmol/L Final     BUN   Date Value Ref Range Status   03/19/2021 22 8 - 23 mg/dL Final     CREATININE   Date Value Ref Range Status   03/19/2021 1.2 (H) 0.5 - 1.0 mg/dL Final     Glucose   Date Value Ref Range Status   03/19/2021 215 (H) 74 - 99 mg/dL Final     Calcium   Date Value Ref Range Status   03/19/2021 6.4 (L) 8.6 - 10.2 mg/dL Final     Total Protein   Date Value Ref Range Status   03/19/2021 4.5 (L) 6.4 - 8.3 g/dL Final     Albumin   Date Value Ref Range Status   03/19/2021 2.4 (L) 3.5 - 5.2 g/dL Final     Total Bilirubin   Date Value Ref Range Status   03/19/2021 0.5 0.0 - 1.2 mg/dL Final     Alkaline Phosphatase   Date Value Ref Range Status   03/19/2021 47 35 - 104 U/L Final     AST   Date Value Ref Range Status   03/19/2021 12 0 - 31 U/L Final     Comment:     Specimen is slightly Hemolyzed. Result may be artificially increased.      ALT   Date Value Ref Range Status   03/19/2021 6 0 - 32 U/L Final     GFR Non-   Date Value Ref Range Status   03/19/2021 42 >=60 mL/min/1.73 Final     Comment:     Chronic Kidney Disease: less than 60 ml/min/1.73 sq.m. Kidney Failure: less than 15 ml/min/1.73 sq.m. Results valid for patients 18 years and older. GFR    Date Value Ref Range Status   03/19/2021 51  Final        No results found.      Assessment/Plan:      Outpatient Encounter Medications as of 5/6/2021   Medication Sig Dispense Refill    COLACE 100 MG capsule Take 2 capsules by mouth daily      OneTouch Delica Lancets 15Q MISC daily      ONETOUCH ULTRA strip TEST once daily      influenza quadrivalent split vaccine (FLUARIX QUADRIVALENT) 0.5 ML injection Fluarix Quad 7200-3911 (PF) 60 mcg (15 mcg x 4)/0.5 mL IM syringe      Lancets (ONETOUCH DELICA PLUS [id number]) MISC TEST once daily      linagliptin (TRADJENTA) 5 MG tablet Take 1 tablet by mouth daily      Plecanatide (TRULANCE) 3 MG TABS Take 1 tablet by mouth daily 30 tablet 1    glimepiride (AMARYL) 2 MG tablet Take 1 tablet by mouth every morning (before breakfast) 90 tablet 0    naproxen sodium (ALEVE) 220 MG tablet Take 220 mg by mouth 2 times daily as needed for Pain      Menthol, Topical Analgesic, (ICY HOT EX) Apply topically      psyllium (METAMUCIL) 28 % packet Take 1 packet by mouth 2 times daily Take with full glass of h20 or juice PRN      losartan (COZAAR) 100 MG tablet Take 1 tablet by mouth daily 90 tablet 0    amLODIPine (NORVASC) 5 MG tablet Take 1 tablet by mouth daily 90 tablet 0    metoprolol tartrate (LOPRESSOR) 25 MG tablet Take 1 tablet by mouth 2 times daily 180 tablet 0    glycerin 2 g suppository Place 1 suppository rectally daily as needed for Constipation 1 suppository 0    ondansetron (ZOFRAN ODT) 4 MG disintegrating tablet Take 1 tablet by mouth every 8 hours as needed for Nausea or Vomiting May substitute for covered product 10 tablet 0    [DISCONTINUED] blood glucose test strips (ASCENSIA AUTODISC VI;ONE TOUCH ULTRA TEST VI) strip OneTouch Ultra Blue Test Strip      fenofibrate (TRIGLIDE) 160 MG tablet Take 160 mg by mouth daily      ferrous sulfate (IRON 325) 325 (65 Fe) MG tablet Take 325 mg by mouth daily (with breakfast)      tiZANidine (ZANAFLEX) 4 MG tablet Take 4 mg by mouth every evening      [DISCONTINUED] linagliptin (TRADJENTA) 5 MG tablet Take 1 tablet by mouth daily 90 tablet 0    dicyclomine (BENTYL) 10 MG capsule Take 2 capsules by mouth 4 times daily as needed (abdominal pain) (Patient not taking: Reported on 5/6/2021) 20 capsule none     No facility-administered encounter medications on file as of 5/6/2021. Ortega Salazar was seen today for ed follow-up.     Diagnoses and all orders for this visit:    Irritable bowel syndrome with constipation    Controlled type 2 diabetes mellitus with diabetic neuropathy, with long-term current use of insulin ([facility]    Essential hypertension, benign    Fall in elderly patient    Other orders  -     Plecanatide (TRULANCE) 3 MG TABS; Take 1 tablet by mouth daily         Patient Instructions   Patient was advised to increase her oral intake of vegetables and fruits and if she cannot chew because of gum and teeth issues she can use a  to make a smoothie to help increasing her intake of fibers  Patient to take Trulance 3 mg 1 tablet every day  She can drink a bottle of magnesium citrate if she has no bowel movement for 4 days in a row or more             Irma Faustin MD   5/6/21

## 2021-05-06 NOTE — PATIENT INSTRUCTIONS
Patient was advised to increase her oral intake of vegetables and fruits and if she cannot chew because of gum and teeth issues she can use a  to make a smoothie to help increasing her intake of fibers  Patient to take Trulance 3 mg 1 tablet every day  She can drink a bottle of magnesium citrate if she has no bowel movement for 4 days in a row or more

## 2021-05-13 ENCOUNTER — TELEPHONE (OUTPATIENT)
Dept: PRIMARY CARE CLINIC | Age: 86
End: 2021-05-13

## 2021-05-18 NOTE — TELEPHONE ENCOUNTER
Called pharmacy and they affirmed the medications were sent to pharmacy in April. . Call then placed to patient to let her know the meds were called in  April and pharmacy is waiting for family to . Developmental delay

## 2021-05-24 ENCOUNTER — APPOINTMENT (OUTPATIENT)
Dept: MRI IMAGING | Age: 86
End: 2021-05-24
Payer: MEDICARE

## 2021-05-24 ENCOUNTER — HOSPITAL ENCOUNTER (EMERGENCY)
Age: 86
Discharge: HOME | End: 2021-05-24
Attending: EMERGENCY MEDICINE
Payer: MEDICARE

## 2021-05-24 ENCOUNTER — APPOINTMENT (OUTPATIENT)
Dept: CT IMAGING | Age: 86
End: 2021-05-24
Payer: MEDICARE

## 2021-05-24 VITALS
HEIGHT: 57 IN | TEMPERATURE: 97.4 F | WEIGHT: 160 LBS | BODY MASS INDEX: 34.52 KG/M2 | DIASTOLIC BLOOD PRESSURE: 79 MMHG | SYSTOLIC BLOOD PRESSURE: 176 MMHG | RESPIRATION RATE: 20 BRPM | OXYGEN SATURATION: 96 % | HEART RATE: 92 BPM

## 2021-05-24 DIAGNOSIS — R32 URINARY INCONTINENCE, UNSPECIFIED TYPE: ICD-10-CM

## 2021-05-24 DIAGNOSIS — M54.42 ACUTE LOW BACK PAIN WITH BILATERAL SCIATICA, UNSPECIFIED BACK PAIN LATERALITY: Primary | ICD-10-CM

## 2021-05-24 DIAGNOSIS — M54.41 ACUTE LOW BACK PAIN WITH BILATERAL SCIATICA, UNSPECIFIED BACK PAIN LATERALITY: Primary | ICD-10-CM

## 2021-05-24 LAB
ALBUMIN SERPL-MCNC: 3.8 G/DL (ref 3.5–5.2)
ALP BLD-CCNC: 77 U/L (ref 35–104)
ALT SERPL-CCNC: 10 U/L (ref 0–32)
ANION GAP SERPL CALCULATED.3IONS-SCNC: 10 MMOL/L (ref 7–16)
AST SERPL-CCNC: 17 U/L (ref 0–31)
BACTERIA: ABNORMAL /HPF
BASOPHILS ABSOLUTE: 0.04 E9/L (ref 0–0.2)
BASOPHILS RELATIVE PERCENT: 0.5 % (ref 0–2)
BILIRUB SERPL-MCNC: 0.5 MG/DL (ref 0–1.2)
BILIRUBIN URINE: NEGATIVE
BLOOD, URINE: ABNORMAL
BUN BLDV-MCNC: 19 MG/DL (ref 6–23)
CALCIUM SERPL-MCNC: 9.2 MG/DL (ref 8.6–10.2)
CHLORIDE BLD-SCNC: 106 MMOL/L (ref 98–107)
CLARITY: CLEAR
CO2: 23 MMOL/L (ref 22–29)
COLOR: ABNORMAL
CREAT SERPL-MCNC: 0.8 MG/DL (ref 0.5–1)
EOSINOPHILS ABSOLUTE: 0.11 E9/L (ref 0.05–0.5)
EOSINOPHILS RELATIVE PERCENT: 1.3 % (ref 0–6)
EPITHELIAL CELLS, UA: ABNORMAL /HPF
GFR AFRICAN AMERICAN: >60
GFR NON-AFRICAN AMERICAN: >60 ML/MIN/1.73
GLUCOSE BLD-MCNC: 250 MG/DL (ref 74–99)
GLUCOSE URINE: NEGATIVE MG/DL
HCT VFR BLD CALC: 40.4 % (ref 34–48)
HEMOGLOBIN: 13 G/DL (ref 11.5–15.5)
IMMATURE GRANULOCYTES #: 0.13 E9/L
IMMATURE GRANULOCYTES %: 1.5 % (ref 0–5)
KETONES, URINE: NEGATIVE MG/DL
LACTIC ACID: 1.8 MMOL/L (ref 0.5–2.2)
LEUKOCYTE ESTERASE, URINE: NEGATIVE
LYMPHOCYTES ABSOLUTE: 2.03 E9/L (ref 1.5–4)
LYMPHOCYTES RELATIVE PERCENT: 24 % (ref 20–42)
MCH RBC QN AUTO: 27.5 PG (ref 26–35)
MCHC RBC AUTO-ENTMCNC: 32.2 % (ref 32–34.5)
MCV RBC AUTO: 85.6 FL (ref 80–99.9)
MONOCYTES ABSOLUTE: 0.83 E9/L (ref 0.1–0.95)
MONOCYTES RELATIVE PERCENT: 9.8 % (ref 2–12)
NEUTROPHILS ABSOLUTE: 5.33 E9/L (ref 1.8–7.3)
NEUTROPHILS RELATIVE PERCENT: 62.9 % (ref 43–80)
NITRITE, URINE: NEGATIVE
PDW BLD-RTO: 14.9 FL (ref 11.5–15)
PH UA: 6 (ref 5–9)
PLATELET # BLD: 239 E9/L (ref 130–450)
PMV BLD AUTO: 11.9 FL (ref 7–12)
POTASSIUM REFLEX MAGNESIUM: 4.2 MMOL/L (ref 3.5–5)
PROTEIN UA: 30 MG/DL
RBC # BLD: 4.72 E12/L (ref 3.5–5.5)
RBC UA: ABNORMAL /HPF (ref 0–2)
SODIUM BLD-SCNC: 139 MMOL/L (ref 132–146)
SPECIFIC GRAVITY UA: 1.01 (ref 1–1.03)
TOTAL PROTEIN: 6.7 G/DL (ref 6.4–8.3)
TROPONIN: <0.01 NG/ML (ref 0–0.03)
UROBILINOGEN, URINE: 0.2 E.U./DL
WBC # BLD: 8.5 E9/L (ref 4.5–11.5)
WBC UA: ABNORMAL /HPF (ref 0–5)

## 2021-05-24 PROCEDURE — 72148 MRI LUMBAR SPINE W/O DYE: CPT

## 2021-05-24 PROCEDURE — 85025 COMPLETE CBC W/AUTO DIFF WBC: CPT

## 2021-05-24 PROCEDURE — 74177 CT ABD & PELVIS W/CONTRAST: CPT

## 2021-05-24 PROCEDURE — 96374 THER/PROPH/DIAG INJ IV PUSH: CPT

## 2021-05-24 PROCEDURE — 80053 COMPREHEN METABOLIC PANEL: CPT

## 2021-05-24 PROCEDURE — 96375 TX/PRO/DX INJ NEW DRUG ADDON: CPT

## 2021-05-24 PROCEDURE — 83605 ASSAY OF LACTIC ACID: CPT

## 2021-05-24 PROCEDURE — 99284 EMERGENCY DEPT VISIT MOD MDM: CPT

## 2021-05-24 PROCEDURE — 81001 URINALYSIS AUTO W/SCOPE: CPT

## 2021-05-24 PROCEDURE — 93005 ELECTROCARDIOGRAM TRACING: CPT | Performed by: STUDENT IN AN ORGANIZED HEALTH CARE EDUCATION/TRAINING PROGRAM

## 2021-05-24 PROCEDURE — 6360000004 HC RX CONTRAST MEDICATION: Performed by: RADIOLOGY

## 2021-05-24 PROCEDURE — 84484 ASSAY OF TROPONIN QUANT: CPT

## 2021-05-24 PROCEDURE — 6360000002 HC RX W HCPCS: Performed by: STUDENT IN AN ORGANIZED HEALTH CARE EDUCATION/TRAINING PROGRAM

## 2021-05-24 RX ORDER — FENTANYL CITRATE 50 UG/ML
50 INJECTION, SOLUTION INTRAMUSCULAR; INTRAVENOUS ONCE
Status: COMPLETED | OUTPATIENT
Start: 2021-05-24 | End: 2021-05-24

## 2021-05-24 RX ORDER — ONDANSETRON 2 MG/ML
4 INJECTION INTRAMUSCULAR; INTRAVENOUS ONCE
Status: COMPLETED | OUTPATIENT
Start: 2021-05-24 | End: 2021-05-24

## 2021-05-24 RX ORDER — DOCUSATE SODIUM 100 MG/1
100 CAPSULE, LIQUID FILLED ORAL DAILY
Qty: 10 CAPSULE | Refills: 0 | Status: SHIPPED | OUTPATIENT
Start: 2021-05-24 | End: 2021-06-03

## 2021-05-24 RX ORDER — TRAMADOL HYDROCHLORIDE 50 MG/1
50 TABLET ORAL EVERY 4 HOURS PRN
Qty: 18 TABLET | Refills: 0 | Status: SHIPPED | OUTPATIENT
Start: 2021-05-24 | End: 2021-05-27

## 2021-05-24 RX ADMIN — ONDANSETRON 4 MG: 2 INJECTION INTRAMUSCULAR; INTRAVENOUS at 10:56

## 2021-05-24 RX ADMIN — IOPAMIDOL 75 ML: 755 INJECTION, SOLUTION INTRAVENOUS at 08:15

## 2021-05-24 RX ADMIN — FENTANYL CITRATE 50 MCG: 50 INJECTION, SOLUTION INTRAMUSCULAR; INTRAVENOUS at 10:11

## 2021-05-24 ASSESSMENT — ENCOUNTER SYMPTOMS
SHORTNESS OF BREATH: 0
COUGH: 0
VOMITING: 0
PHOTOPHOBIA: 0
NAUSEA: 0
BOWEL INCONTINENCE: 0
ABDOMINAL DISTENTION: 0
DIARRHEA: 0
ABDOMINAL PAIN: 0
CHEST TIGHTNESS: 0
BACK PAIN: 1

## 2021-05-24 ASSESSMENT — PAIN DESCRIPTION - FREQUENCY: FREQUENCY: CONTINUOUS

## 2021-05-24 ASSESSMENT — PAIN DESCRIPTION - DESCRIPTORS: DESCRIPTORS: ACHING;SHARP

## 2021-05-24 ASSESSMENT — PAIN SCALES - GENERAL
PAINLEVEL_OUTOF10: 8
PAINLEVEL_OUTOF10: 10

## 2021-05-24 ASSESSMENT — PAIN DESCRIPTION - PAIN TYPE: TYPE: ACUTE PAIN

## 2021-05-24 NOTE — ED PROVIDER NOTES
Eran Quiroz is a 80year old female with PMH of HTN, HLD, DM, bowel obstruction, IBS who presented to ED with bilateral flank pain and back pain that started 2 days ago and is worsening. Patient's symptoms started as back pain radiating down to the hips bilaterally. Yesterday patient began to have urinary incontinence yesterday that is new and not improving for one day. Patient ambulates with walker at baseline but has had increased weakness for two days. Patient denies trauma. Patient has not had confusion or recent illness. Patient has a history of appendectomy and cholecystectomy. The history is provided by the patient and medical records. Back Pain  Location:  Lumbar spine  Quality:  Aching  Pain severity:  Moderate  Pain is:  Same all the time  Onset quality:  Gradual  Duration:  2 days  Timing:  Constant  Progression:  Worsening  Chronicity:  New  Relieved by:  Nothing  Worsened by:  Nothing  Ineffective treatments:  None tried  Associated symptoms: bladder incontinence and weakness    Associated symptoms: no abdominal pain, no bowel incontinence, no chest pain, no dysuria, no fever, no headaches, no numbness, no paresthesias, no pelvic pain and no perianal numbness    Risk factors: no hx of cancer, no recent surgery and no steroid use         Review of Systems   Constitutional: Negative for chills, diaphoresis, fatigue and fever. Eyes: Negative for photophobia and visual disturbance. Respiratory: Negative for cough, chest tightness and shortness of breath. Cardiovascular: Negative for chest pain, palpitations and leg swelling. Gastrointestinal: Negative for abdominal distention, abdominal pain, bowel incontinence, diarrhea, nausea and vomiting. Genitourinary: Positive for bladder incontinence. Negative for dysuria and pelvic pain. Musculoskeletal: Positive for back pain. Negative for neck pain and neck stiffness. Skin: Negative for pallor and rash.    Neurological: Positive for weakness. Negative for numbness, headaches and paresthesias. Psychiatric/Behavioral: Negative for confusion. Physical Exam  Vitals and nursing note reviewed. Constitutional:       General: She is in acute distress. Appearance: She is not ill-appearing or diaphoretic. HENT:      Head: Normocephalic and atraumatic. Eyes:      General: No scleral icterus. Conjunctiva/sclera: Conjunctivae normal.      Pupils: Pupils are equal, round, and reactive to light. Cardiovascular:      Rate and Rhythm: Normal rate and regular rhythm. Pulmonary:      Effort: Pulmonary effort is normal.      Breath sounds: Normal breath sounds. Abdominal:      General: Bowel sounds are normal. There is no distension. Palpations: Abdomen is soft. Tenderness: There is no abdominal tenderness. There is no guarding or rebound. Musculoskeletal:      Cervical back: Normal range of motion and neck supple. No rigidity. No muscular tenderness. Right lower leg: Edema present. Left lower leg: Edema present. Comments: 1+ LE edema  Right and left paraspinal tenderness  No midline tenderness  3/5 strength LE equal bilaterally  Normal sensation     Skin:     General: Skin is warm and dry. Capillary Refill: Capillary refill takes less than 2 seconds. Coloration: Skin is not pale. Findings: No erythema or rash. Neurological:      Mental Status: She is alert and oriented to person, place, and time. Psychiatric:         Mood and Affect: Mood normal.          Procedures     MDM  Number of Diagnoses or Management Options  Acute low back pain with bilateral sciatica, unspecified back pain laterality  Urinary incontinence, unspecified type  Diagnosis management comments: Afshin Jeffery is a 80year old female who presented to ED with concerns for back pain and urinary incontinence. CT abdomen and UA unremarkable for acute process.  MRI was ordered to evaluate for cord compression which did not show medications have been reviewed.     Allergies: Codeine and Morphine    -------------------------------------------------- RESULTS -------------------------------------------------  Labs:  Results for orders placed or performed during the hospital encounter of 05/24/21   Urinalysis with Microscopic   Result Value Ref Range    Color, UA Straw Straw/Yellow    Clarity, UA Clear Clear    Glucose, Ur Negative Negative mg/dL    Bilirubin Urine Negative Negative    Ketones, Urine Negative Negative mg/dL    Specific Gravity, UA 1.010 1.005 - 1.030    Blood, Urine TRACE (A) Negative    pH, UA 6.0 5.0 - 9.0    Protein, UA 30 (A) Negative mg/dL    Urobilinogen, Urine 0.2 <2.0 E.U./dL    Nitrite, Urine Negative Negative    Leukocyte Esterase, Urine Negative Negative    WBC, UA 0-1 0 - 5 /HPF    RBC, UA 0-1 0 - 2 /HPF    Epithelial Cells, UA RARE /HPF    Bacteria, UA RARE (A) None Seen /HPF   CBC auto differential   Result Value Ref Range    WBC 8.5 4.5 - 11.5 E9/L    RBC 4.72 3.50 - 5.50 E12/L    Hemoglobin 13.0 11.5 - 15.5 g/dL    Hematocrit 40.4 34.0 - 48.0 %    MCV 85.6 80.0 - 99.9 fL    MCH 27.5 26.0 - 35.0 pg    MCHC 32.2 32.0 - 34.5 %    RDW 14.9 11.5 - 15.0 fL    Platelets 812 756 - 358 E9/L    MPV 11.9 7.0 - 12.0 fL    Neutrophils % 62.9 43.0 - 80.0 %    Immature Granulocytes % 1.5 0.0 - 5.0 %    Lymphocytes % 24.0 20.0 - 42.0 %    Monocytes % 9.8 2.0 - 12.0 %    Eosinophils % 1.3 0.0 - 6.0 %    Basophils % 0.5 0.0 - 2.0 %    Neutrophils Absolute 5.33 1.80 - 7.30 E9/L    Immature Granulocytes # 0.13 E9/L    Lymphocytes Absolute 2.03 1.50 - 4.00 E9/L    Monocytes Absolute 0.83 0.10 - 0.95 E9/L    Eosinophils Absolute 0.11 0.05 - 0.50 E9/L    Basophils Absolute 0.04 0.00 - 0.20 E9/L   Comprehensive Metabolic Panel w/ Reflex to MG   Result Value Ref Range    Sodium 139 132 - 146 mmol/L    Potassium reflex Magnesium 4.2 3.5 - 5.0 mmol/L    Chloride 106 98 - 107 mmol/L    CO2 23 22 - 29 mmol/L    Anion Gap 10 7 - 16 mmol/L Glucose 250 (H) 74 - 99 mg/dL    BUN 19 6 - 23 mg/dL    CREATININE 0.8 0.5 - 1.0 mg/dL    GFR Non-African American >60 >=60 mL/min/1.73    GFR African American >60     Calcium 9.2 8.6 - 10.2 mg/dL    Total Protein 6.7 6.4 - 8.3 g/dL    Albumin 3.8 3.5 - 5.2 g/dL    Total Bilirubin 0.5 0.0 - 1.2 mg/dL    Alkaline Phosphatase 77 35 - 104 U/L    ALT 10 0 - 32 U/L    AST 17 0 - 31 U/L   Lactic Acid, Plasma   Result Value Ref Range    Lactic Acid 1.8 0.5 - 2.2 mmol/L   Troponin   Result Value Ref Range    Troponin <0.01 0.00 - 0.03 ng/mL   EKG 12 Lead   Result Value Ref Range    Ventricular Rate 87 BPM    Atrial Rate 87 BPM    P-R Interval 130 ms    QRS Duration 90 ms    Q-T Interval 410 ms    QTc Calculation (Bazett) 493 ms    P Axis 51 degrees    R Axis -32 degrees    T Axis 88 degrees       Radiology:  MRI LUMBAR SPINE WO CONTRAST   Final Result   1. Advanced degenerative change with multiple disc bulges. Slight grade 1   retrolisthesis at L1-2 and L2-3.   2. Multilevel central canal stenosis as described above, severe at L3-4,   moderate at L1-2, L2-3 and L4-5 with severe left subarticular recess stenosis   at L4-5. Overall, the findings are worse compared to 07/14/2010. 3. Multilevel neural foraminal stenosis as described above, most severe at   L1-2 and L2-3 on the right. 4. Mild old-appearing compression deformity of L2. Hemangiomas in the L2 and   L4 vertebral bodies. No acute fracture seen. CT ABDOMEN PELVIS W IV CONTRAST Additional Contrast? None   Final Result   Marked splenomegaly      Otherwise no acute findings             ------------------------- NURSING NOTES AND VITALS REVIEWED ---------------------------  Date / Time Roomed:  5/24/2021  6:26 AM  ED Bed Assignment:  12/12    The nursing notes within the ED encounter and vital signs as below have been reviewed.    BP (!) 176/79   Pulse 92   Temp 97.4 °F (36.3 °C) (Temporal)   Resp 20   Ht 4' 9\" (1.448 m)   Wt 160 lb (72.6 kg)   SpO2 96%   BMI 34.62 kg/m²   Oxygen Saturation Interpretation: Normal      ------------------------------------------ PROGRESS NOTES ------------------------------------------  8:06 PM EDT  I have spoken with the patient and discussed todays results, in addition to providing specific details for the plan of care and counseling regarding the diagnosis and prognosis. Their questions are answered at this time and they are agreeable with the plan. I discussed at length with them reasons for immediate return here for re evaluation. They will followup with their primary care physician by calling their office tomorrow. --------------------------------- ADDITIONAL PROVIDER NOTES ---------------------------------  At this time the patient is without objective evidence of an acute process requiring hospitalization or inpatient management. They have remained hemodynamically stable throughout their entire ED visit and are stable for discharge with outpatient follow-up. The plan has been discussed in detail and they are aware of the specific conditions for emergent return, as well as the importance of follow-up. Discharge Medication List as of 5/24/2021 11:19 AM      START taking these medications    Details   traMADol (ULTRAM) 50 MG tablet Take 1 tablet by mouth every 4 hours as needed for Pain for up to 3 days. Intended supply: 3 days. Take lowest dose possible to manage pain, Disp-18 tablet, R-0Print             Diagnosis:  1. Acute low back pain with bilateral sciatica, unspecified back pain laterality    2. Urinary incontinence, unspecified type        Disposition:  Patient's disposition: Discharge to home  Patient's condition is stable. Kely Smith MD  Resident  05/24/21 2008      ATTENDING PROVIDER ATTESTATION:     I have personally performed and/or participated in the history, exam, medical decision making, and procedures and agree with all pertinent clinical information.       I have also reviewed and agree with the past medical, family and social history unless otherwise noted. I have discussed this patient in detail with the resident, and provided the instruction and education regarding low back pain, urinary incontinence and cauda equina syndrome. My findings/Plan: Heart RRR. Lungs CTA bilaterally. Abdomen soft, nontender. Bowel sounds normal. No saddle anesthesia. Supportive care. Discharge for outpatient follow up.          [address] [place] [address],   05/26/21 [id number]

## 2021-05-26 LAB
EKG ATRIAL RATE: 87 BPM
EKG P AXIS: 51 DEGREES
EKG P-R INTERVAL: 130 MS
EKG Q-T INTERVAL: 410 MS
EKG QRS DURATION: 90 MS
EKG QTC CALCULATION (BAZETT): 493 MS
EKG R AXIS: -32 DEGREES
EKG T AXIS: 88 DEGREES
EKG VENTRICULAR RATE: 87 BPM

## 2021-06-12 ENCOUNTER — HOSPITAL ENCOUNTER (EMERGENCY)
Age: 86
Discharge: HOME | End: 2021-06-12
Attending: EMERGENCY MEDICINE
Payer: MEDICARE

## 2021-06-12 ENCOUNTER — APPOINTMENT (OUTPATIENT)
Dept: CT IMAGING | Age: 86
End: 2021-06-12
Payer: MEDICARE

## 2021-06-12 VITALS
TEMPERATURE: 97.9 F | OXYGEN SATURATION: 100 % | DIASTOLIC BLOOD PRESSURE: 65 MMHG | SYSTOLIC BLOOD PRESSURE: 160 MMHG | HEART RATE: 70 BPM | RESPIRATION RATE: 13 BRPM

## 2021-06-12 DIAGNOSIS — E86.0 DEHYDRATION: ICD-10-CM

## 2021-06-12 DIAGNOSIS — R10.9 ABDOMINAL PAIN, UNSPECIFIED ABDOMINAL LOCATION: Primary | ICD-10-CM

## 2021-06-12 DIAGNOSIS — R93.89 ABNORMAL CT SCAN: ICD-10-CM

## 2021-06-12 LAB
ALBUMIN SERPL-MCNC: 3.9 G/DL (ref 3.5–5.2)
ALP BLD-CCNC: 65 U/L (ref 35–104)
ALT SERPL-CCNC: 11 U/L (ref 0–32)
ANION GAP SERPL CALCULATED.3IONS-SCNC: 13 MMOL/L (ref 7–16)
AST SERPL-CCNC: 31 U/L (ref 0–31)
BACTERIA: NORMAL /HPF
BASOPHILS ABSOLUTE: 0.04 E9/L (ref 0–0.2)
BASOPHILS RELATIVE PERCENT: 0.5 % (ref 0–2)
BILIRUB SERPL-MCNC: 0.4 MG/DL (ref 0–1.2)
BILIRUBIN URINE: NEGATIVE
BLOOD, URINE: ABNORMAL
BUN BLDV-MCNC: 23 MG/DL (ref 6–23)
CALCIUM SERPL-MCNC: 8.7 MG/DL (ref 8.6–10.2)
CHLORIDE BLD-SCNC: 102 MMOL/L (ref 98–107)
CLARITY: CLEAR
CO2: 20 MMOL/L (ref 22–29)
COLOR: YELLOW
CREAT SERPL-MCNC: 0.9 MG/DL (ref 0.5–1)
EOSINOPHILS ABSOLUTE: 0.13 E9/L (ref 0.05–0.5)
EOSINOPHILS RELATIVE PERCENT: 1.6 % (ref 0–6)
GFR AFRICAN AMERICAN: >60
GFR NON-AFRICAN AMERICAN: 58 ML/MIN/1.73
GLUCOSE BLD-MCNC: 306 MG/DL (ref 74–99)
GLUCOSE URINE: 500 MG/DL
HCT VFR BLD CALC: 38.5 % (ref 34–48)
HEMOGLOBIN: 12.3 G/DL (ref 11.5–15.5)
IMMATURE GRANULOCYTES #: 0.13 E9/L
IMMATURE GRANULOCYTES %: 1.6 % (ref 0–5)
INR BLD: 1.1
KETONES, URINE: NEGATIVE MG/DL
LACTIC ACID: 1.8 MMOL/L (ref 0.5–2.2)
LEUKOCYTE ESTERASE, URINE: NEGATIVE
LIPASE: 54 U/L (ref 13–60)
LYMPHOCYTES ABSOLUTE: 1.82 E9/L (ref 1.5–4)
LYMPHOCYTES RELATIVE PERCENT: 23 % (ref 20–42)
MCH RBC QN AUTO: 28 PG (ref 26–35)
MCHC RBC AUTO-ENTMCNC: 31.9 % (ref 32–34.5)
MCV RBC AUTO: 87.5 FL (ref 80–99.9)
MONOCYTES ABSOLUTE: 0.6 E9/L (ref 0.1–0.95)
MONOCYTES RELATIVE PERCENT: 7.6 % (ref 2–12)
NEUTROPHILS ABSOLUTE: 5.21 E9/L (ref 1.8–7.3)
NEUTROPHILS RELATIVE PERCENT: 65.7 % (ref 43–80)
NITRITE, URINE: NEGATIVE
PDW BLD-RTO: 15 FL (ref 11.5–15)
PH UA: 7.5 (ref 5–9)
PLATELET # BLD: 202 E9/L (ref 130–450)
PMV BLD AUTO: 13.3 FL (ref 7–12)
POTASSIUM SERPL-SCNC: 5 MMOL/L (ref 3.5–5)
PROTEIN UA: 30 MG/DL
PROTHROMBIN TIME: 12.6 SEC (ref 9.3–12.4)
RBC # BLD: 4.4 E12/L (ref 3.5–5.5)
RBC UA: NORMAL /HPF (ref 0–2)
SODIUM BLD-SCNC: 135 MMOL/L (ref 132–146)
SPECIFIC GRAVITY UA: 1.02 (ref 1–1.03)
TOTAL PROTEIN: 6.4 G/DL (ref 6.4–8.3)
UROBILINOGEN, URINE: 0.2 E.U./DL
WBC # BLD: 7.9 E9/L (ref 4.5–11.5)
WBC UA: NORMAL /HPF (ref 0–5)

## 2021-06-12 PROCEDURE — 83690 ASSAY OF LIPASE: CPT

## 2021-06-12 PROCEDURE — 80053 COMPREHEN METABOLIC PANEL: CPT

## 2021-06-12 PROCEDURE — 85610 PROTHROMBIN TIME: CPT

## 2021-06-12 PROCEDURE — 81001 URINALYSIS AUTO W/SCOPE: CPT

## 2021-06-12 PROCEDURE — 83605 ASSAY OF LACTIC ACID: CPT

## 2021-06-12 PROCEDURE — 74177 CT ABD & PELVIS W/CONTRAST: CPT

## 2021-06-12 PROCEDURE — 99282 EMERGENCY DEPT VISIT SF MDM: CPT

## 2021-06-12 PROCEDURE — 85025 COMPLETE CBC W/AUTO DIFF WBC: CPT

## 2021-06-12 PROCEDURE — 36415 COLL VENOUS BLD VENIPUNCTURE: CPT

## 2021-06-12 PROCEDURE — 6360000004 HC RX CONTRAST MEDICATION: Performed by: RADIOLOGY

## 2021-06-12 RX ORDER — FENTANYL CITRATE 50 UG/ML
INJECTION, SOLUTION INTRAMUSCULAR; INTRAVENOUS
Status: DISCONTINUED
Start: 2021-06-12 | End: 2021-06-12 | Stop reason: HOSPADM

## 2021-06-12 RX ORDER — DICYCLOMINE HYDROCHLORIDE 10 MG/1
20 CAPSULE ORAL 4 TIMES DAILY PRN
Qty: 20 CAPSULE | Refills: 0 | Status: SHIPPED | OUTPATIENT
Start: 2021-06-12 | End: 2021-08-23

## 2021-06-12 RX ORDER — ONDANSETRON 2 MG/ML
INJECTION INTRAMUSCULAR; INTRAVENOUS
Status: DISCONTINUED
Start: 2021-06-12 | End: 2021-06-12 | Stop reason: HOSPADM

## 2021-06-12 RX ORDER — PROMETHAZINE HYDROCHLORIDE 25 MG/1
25 TABLET ORAL EVERY 6 HOURS PRN
Qty: 10 TABLET | Refills: 0 | Status: SHIPPED | OUTPATIENT
Start: 2021-06-12 | End: 2021-09-28

## 2021-06-12 RX ADMIN — IOPAMIDOL 75 ML: 755 INJECTION, SOLUTION INTRAVENOUS at 05:35

## 2021-06-12 ASSESSMENT — ENCOUNTER SYMPTOMS
SHORTNESS OF BREATH: 0
BACK PAIN: 0
CONSTIPATION: 1
NAUSEA: 1
VOMITING: 1
ABDOMINAL PAIN: 1
COUGH: 0

## 2021-06-12 NOTE — ED PROVIDER NOTES
This is a 59-year-old female with a past medical history of hypertension who presents to the ED for evaluation of abdominal pain. She states for the past 2 days she has been having severe abdominal pain located diffusely in her abdomen. She states she feels nauseous and has vomited as well as had diarrhea. She has no chest pain or shortness of breath. She denies any mitigating or exacerbating factors. She states the pain does not be worse in her lower aspect of her abdomen. She has no dysuria or hematuria. Patient also endorses some lightheadedness and feeling dizzy. The history is provided by the patient. No  was used. Review of Systems   Constitutional: Negative for fever. HENT: Negative for congestion. Eyes: Negative for visual disturbance. Respiratory: Negative for cough and shortness of breath. Cardiovascular: Negative for chest pain. Gastrointestinal: Positive for abdominal pain, constipation, nausea and vomiting. Endocrine: Negative for polyuria. Genitourinary: Negative for dysuria. Musculoskeletal: Negative for back pain. Skin: Negative for rash. Allergic/Immunologic: Negative for immunocompromised state. Neurological: Negative for headaches. Hematological: Does not bruise/bleed easily. Psychiatric/Behavioral: Negative for confusion. Physical Exam  Vitals and nursing note reviewed. Constitutional:       General: She is not in acute distress. Appearance: She is well-developed. She is not ill-appearing. HENT:      Head: Normocephalic and atraumatic. Mouth/Throat:      Mouth: Mucous membranes are moist.   Eyes:      Extraocular Movements: Extraocular movements intact. Pupils: Pupils are equal, round, and reactive to light. Neck:      Vascular: No JVD. Cardiovascular:      Rate and Rhythm: Normal rate. Pulmonary:      Effort: Pulmonary effort is normal.      Breath sounds: No wheezing, rhonchi or rales.    Chest: Chest wall: No tenderness. Abdominal:      General: There is no distension. Palpations: Abdomen is soft. Tenderness: There is no abdominal tenderness. There is no guarding or rebound. Hernia: No hernia is present. Musculoskeletal:      Cervical back: Normal range of motion and neck supple. Right lower leg: No edema. Left lower leg: No edema. Skin:     General: Skin is warm and dry. Capillary Refill: Capillary refill takes less than 2 seconds. Neurological:      General: No focal deficit present. Mental Status: She is alert and oriented to person, place, and time. Mental status is at baseline. Cranial Nerves: No cranial nerve deficit. Psychiatric:         Mood and Affect: Mood normal.         Behavior: Behavior normal.          Procedures     MDM  Number of Diagnoses or Management Options  Abdominal pain, unspecified abdominal location  Abnormal CT scan  Dehydration  Diagnosis management comments: Patient presented to the ED for evaluation of abdominal pain as well as some lightheadedness. Patient was nontoxic had no signs of a bowel obstruction or any type of intra-abdominal pathology that was required emergent need. She was found to have splenomegaly which had been seen previously in the past.  She was treated symptomatically with IV fluids analgesics and antiemetics and was able to tolerate p.o. intake.   Did offer patient inpatient admission however states she felt much better was appropriate for outpatient follow-up was given instructions to follow-up with her PCP.                       --------------------------------------------- PAST HISTORY ---------------------------------------------  Past Medical History:  has a past medical history of Arthritis, Bowel obstruction ([facility] [address] Diabetes mellitus ([facility] [address] Diverticulosis, Hyperlipidemia, Hypertension, Irritable bowel syndrome, Peptic ulcer disease, Spinal stenosis, Syncope and collapse, and Type 2 diabetes mellitus without complication ([facility] [address] Past Surgical History:  has a past surgical history that includes Hysterectomy; Cholecystectomy; Tonsillectomy; Colon surgery; Appendectomy; Breast surgery; joint replacement; ECHO Compl W Dop Color Flow (4/22/2013); Thyroid surgery; Small intestine surgery; Knee arthroscopy; Total knee arthroplasty (Right); Colonoscopy (08/2012); and pre-malignant / benign skin lesion excision (Left, 2/4/2020). Social History:  reports that she has never smoked. She has never used smokeless tobacco. She reports that she does not drink alcohol and does not use drugs. Family History: family history includes Cancer in her father. The patients home medications have been reviewed.     Allergies: Codeine and Morphine    -------------------------------------------------- RESULTS -------------------------------------------------  Labs:  Results for orders placed or performed during the hospital encounter of 06/12/21   CBC Auto Differential   Result Value Ref Range    WBC 7.9 4.5 - 11.5 E9/L    RBC 4.40 3.50 - 5.50 E12/L    Hemoglobin 12.3 11.5 - 15.5 g/dL    Hematocrit 38.5 34.0 - 48.0 %    MCV 87.5 80.0 - 99.9 fL    MCH 28.0 26.0 - 35.0 pg    MCHC 31.9 (L) 32.0 - 34.5 %    RDW 15.0 11.5 - 15.0 fL    Platelets 113 385 - 777 E9/L    MPV 13.3 (H) 7.0 - 12.0 fL    Neutrophils % 65.7 43.0 - 80.0 %    Immature Granulocytes % 1.6 0.0 - 5.0 %    Lymphocytes % 23.0 20.0 - 42.0 %    Monocytes % 7.6 2.0 - 12.0 %    Eosinophils % 1.6 0.0 - 6.0 %    Basophils % 0.5 0.0 - 2.0 %    Neutrophils Absolute 5.21 1.80 - 7.30 E9/L    Immature Granulocytes # 0.13 E9/L    Lymphocytes Absolute 1.82 1.50 - 4.00 E9/L    Monocytes Absolute 0.60 0.10 - 0.95 E9/L    Eosinophils Absolute 0.13 0.05 - 0.50 E9/L    Basophils Absolute 0.04 0.00 - 0.20 E9/L   Comprehensive Metabolic Panel   Result Value Ref Range    Sodium 135 132 - 146 mmol/L    Potassium 5.0 3.5 - 5.0 mmol/L    Chloride 102 98 - 107 mmol/L    CO2 20 (L) 22 - 29 mmol/L    Anion Gap 13 7 - 16 mmol/L    Glucose 306 (H) 74 - 99 mg/dL    BUN 23 6 - 23 mg/dL    CREATININE 0.9 0.5 - 1.0 mg/dL    GFR Non-African American 58 >=60 mL/min/1.73    GFR African American >60     Calcium 8.7 8.6 - 10.2 mg/dL    Total Protein 6.4 6.4 - 8.3 g/dL    Albumin 3.9 3.5 - 5.2 g/dL    Total Bilirubin 0.4 0.0 - 1.2 mg/dL    Alkaline Phosphatase 65 35 - 104 U/L    ALT 11 0 - 32 U/L    AST 31 0 - 31 U/L   Lipase   Result Value Ref Range    Lipase 54 13 - 60 U/L   Protime-INR   Result Value Ref Range    Protime 12.6 (H) 9.3 - 12.4 sec    INR 1.1    Lactic Acid, Plasma   Result Value Ref Range    Lactic Acid 1.8 0.5 - 2.2 mmol/L   Urinalysis   Result Value Ref Range    Color, UA Yellow Straw/Yellow    Clarity, UA Clear Clear    Glucose, Ur 500 (A) Negative mg/dL    Bilirubin Urine Negative Negative    Ketones, Urine Negative Negative mg/dL    Specific Gravity, UA 1.020 1.005 - 1.030    Blood, Urine TRACE (A) Negative    pH, UA 7.5 5.0 - 9.0    Protein, UA 30 (A) Negative mg/dL    Urobilinogen, Urine 0.2 <2.0 E.U./dL    Nitrite, Urine Negative Negative    Leukocyte Esterase, Urine Negative Negative   Microscopic Urinalysis   Result Value Ref Range    WBC, UA NONE 0 - 5 /HPF    RBC, UA NONE 0 - 2 /HPF    Bacteria, UA NONE SEEN None Seen /HPF       Radiology:  CT ABDOMEN PELVIS W IV CONTRAST Additional Contrast? None   Final Result   Marked splenomegaly is similar to previous. Other chronic appearing findings. ------------------------- NURSING NOTES AND VITALS REVIEWED ---------------------------  Date / Time Roomed:  6/12/2021  4:22 AM  ED Bed Assignment:  03/03    The nursing notes within the ED encounter and vital signs as below have been reviewed.    BP (!) 160/65   Pulse 70   Temp 97.9 °F (36.6 °C)   Resp 13   SpO2 100%   Oxygen Saturation Interpretation: Normal      ------------------------------------------ PROGRESS NOTES ------------------------------------------  6:45 AM EDT  I have spoken with the patient and discussed todays results, in addition to providing specific details for the plan of care and counseling regarding the diagnosis and prognosis. Their questions are answered at this time and they are agreeable with the plan. I discussed at length with them reasons for immediate return here for re evaluation. They will followup with their PCP.      --------------------------------- ADDITIONAL PROVIDER NOTES ---------------------------------  At this time the patient is without objective evidence of an acute process requiring hospitalization or inpatient management. They have remained hemodynamically stable throughout their entire ED visit and are stable for discharge with outpatient follow-up. The plan has been discussed in detail and they are aware of the specific conditions for emergent return, as well as the importance of follow-up. Discharge Medication List as of 6/12/2021  6:12 AM      START taking these medications    Details   promethazine (PHENERGAN) 25 MG tablet Take 1 tablet by mouth every 6 hours as needed for Nausea, Disp-10 tablet, R-0Print             Diagnosis:  1. Abdominal pain, unspecified abdominal location    2. Dehydration    3. Abnormal CT scan        Disposition:  Patient's disposition: Discharge to home  Patient's condition is stable.      Radha Little DO  06/12/21 [id number]

## 2021-06-12 NOTE — ED NOTES
See paper charting for triage, assessment, and orders prior to [phone].      Candance Sensor, RN  06/12/21 [id number]

## 2021-06-12 NOTE — ED NOTES
Bed: 03  Expected date:   Expected time:   Means of arrival:   Comments:  Shannan Son RN  06/12/21 0422

## 2021-06-21 ENCOUNTER — OFFICE VISIT (OUTPATIENT)
Dept: PRIMARY CARE CLINIC | Age: 86
End: 2021-06-21
Payer: MEDICARE

## 2021-06-21 VITALS
HEIGHT: 57 IN | BODY MASS INDEX: 35.25 KG/M2 | TEMPERATURE: 97.5 F | HEART RATE: 75 BPM | DIASTOLIC BLOOD PRESSURE: 84 MMHG | OXYGEN SATURATION: 93 % | SYSTOLIC BLOOD PRESSURE: 140 MMHG | WEIGHT: 163.4 LBS

## 2021-06-21 DIAGNOSIS — M48.062 SPINAL STENOSIS, LUMBAR REGION WITH NEUROGENIC CLAUDICATION: Primary | ICD-10-CM

## 2021-06-21 DIAGNOSIS — Z79.4 CONTROLLED TYPE 2 DIABETES MELLITUS WITH DIABETIC NEUROPATHY, WITH LONG-TERM CURRENT USE OF INSULIN (HCC): ICD-10-CM

## 2021-06-21 DIAGNOSIS — I10 ESSENTIAL HYPERTENSION, BENIGN: ICD-10-CM

## 2021-06-21 DIAGNOSIS — N39.41 URGE URINARY INCONTINENCE: ICD-10-CM

## 2021-06-21 DIAGNOSIS — E11.40 CONTROLLED TYPE 2 DIABETES MELLITUS WITH DIABETIC NEUROPATHY, WITH LONG-TERM CURRENT USE OF INSULIN (HCC): ICD-10-CM

## 2021-06-21 PROCEDURE — 99213 OFFICE O/P EST LOW 20 MIN: CPT | Performed by: INTERNAL MEDICINE

## 2021-06-21 RX ORDER — GABAPENTIN 100 MG/1
100 CAPSULE ORAL 2 TIMES DAILY
Qty: 60 CAPSULE | Refills: 0 | Status: SHIPPED
Start: 2021-06-21 | Stop reason: SDUPTHER

## 2021-06-21 NOTE — PATIENT INSTRUCTIONS
Patient was advised to take prescribed medications  Patient declined the advice of seeing pain clinic

## 2021-06-21 NOTE — PROGRESS NOTES
erythema  Neck:  Supple, no goiter, no carotid bruits, no LAD  Lungs:  CTA   Heart:  RRR, no murmurs, gallops or rubs  Abdomen:  Soft/nt/nd, + bowel sounds  Extremities:  No clubbing, cyanosis or edema  Skin: unremarkable    Hemoglobin A1C   Date Value Ref Range Status   02/12/2021 6.5 % Final     Cholesterol, Total   Date Value Ref Range Status   02/12/2021 106 mg/dL Final     Triglycerides   Date Value Ref Range Status   02/12/2021 191 mg/dL Final     HDL   Date Value Ref Range Status   02/12/2021 25 (A) 35 - 70 mg/dL Final     LDL Calculated   Date Value Ref Range Status   02/12/2021 43 0 - 160 mg/dL Final     VLDL   Date Value Ref Range Status   02/12/2021 38 mg/dL Final     Chol/HDL Ratio   Date Value Ref Range Status   02/12/2021 4.2  Final     Sodium   Date Value Ref Range Status   06/12/2021 135 132 - 146 mmol/L Final     Potassium   Date Value Ref Range Status   06/12/2021 5.0 3.5 - 5.0 mmol/L Final     Comment:     Specimen is moderately Hemolyzed. Result may be artificially increased.      Chloride   Date Value Ref Range Status   06/12/2021 102 98 - 107 mmol/L Final     CO2   Date Value Ref Range Status   06/12/2021 20 (L) 22 - 29 mmol/L Final     BUN   Date Value Ref Range Status   06/12/2021 23 6 - 23 mg/dL Final     CREATININE   Date Value Ref Range Status   06/12/2021 0.9 0.5 - 1.0 mg/dL Final     Glucose   Date Value Ref Range Status   06/12/2021 306 (H) 74 - 99 mg/dL Final     Calcium   Date Value Ref Range Status   06/12/2021 8.7 8.6 - 10.2 mg/dL Final     Total Protein   Date Value Ref Range Status   06/12/2021 6.4 6.4 - 8.3 g/dL Final     Albumin   Date Value Ref Range Status   06/12/2021 3.9 3.5 - 5.2 g/dL Final     Total Bilirubin   Date Value Ref Range Status   06/12/2021 0.4 0.0 - 1.2 mg/dL Final     Alkaline Phosphatase   Date Value Ref Range Status   06/12/2021 65 35 - 104 U/L Final     AST   Date Value Ref Range Status   06/12/2021 31 0 - 31 U/L Final     Comment:     Specimen is moderately Hemolyzed. Result may be artificially increased. ALT   Date Value Ref Range Status   06/12/2021 11 0 - 32 U/L Final     GFR Non-   Date Value Ref Range Status   06/12/2021 58 >=60 mL/min/1.73 Final     Comment:     Chronic Kidney Disease: less than 60 ml/min/1.73 sq.m. Kidney Failure: less than 15 ml/min/1.73 sq.m. Results valid for patients 18 years and older. GFR    Date Value Ref Range Status   06/12/2021 >60  Final        MRI LUMBAR SPINE WO CONTRAST    Result Date: 5/24/2021  EXAMINATION: MRI OF THE LUMBAR SPINE WITHOUT CONTRAST, 5/24/2021 9:16 am TECHNIQUE: Multiplanar multisequence MRI of the lumbar spine was performed without the administration of intravenous contrast. COMPARISON: Plain films of the lumbar spine dated 02/20/2021 and MRI lumbar spine dated 07/14/2010 HISTORY: ORDERING SYSTEM PROVIDED HISTORY: cord compression, back pain with uriinary incontinence TECHNOLOGIST PROVIDED HISTORY: Reason for exam:->cord compression, back pain with uriinary incontinence Low back pain with bilateral leg pain, left greater than right FINDINGS: BONES/ALIGNMENT: There is grade 1 retrolisthesis at L1-2 and L2-3. Mild compression deformity of L2 is noted that appears to be old with no significant marrow edema. There are hemangiomas within the L2 and L4 vertebral bodies that was seen in 2010. There is disc desiccation at multiple levels with disc space narrowing, moderate to severe at L1-2 and moderate at L2-3 and L3-4 with some subchondral signal change, consistent with degenerative disc disease. Otherwise, the bone marrow signal is unremarkable. SPINAL CORD: The conus terminates normally. SOFT TISSUES: No paraspinal mass identified. There is a small left upper pole renal cyst. On the sagittal images at T12-L1, there is mild disc bulge with evidence of bilateral posterior facet degenerative change.   There appears to be mild central canal stenosis but no axial images of this level were done. L1-L2: There is mild disc bulge with osteophyte and moderate to severe bilateral posterior facet degenerative change. This combines with grade 1 retrolisthesis to cause moderate central canal stenosis. There is severe right and at least moderate left neural foraminal narrowing. L2-L3: There is disc bulge and moderate to severe bilateral posterior facet degenerative change with ligamentum flavum hypertrophy. This combines with grade 1 retrolisthesis to cause moderate central canal stenosis, slightly worse compared to the prior study. There is severe right and moderate left neural foraminal narrowing. L3-L4: There is disc bulge and severe bilateral posterior facet degenerative change with ligamentum flavum hypertrophy causing severe central canal stenosis, similar compared to the prior study. There is moderate to severe left and moderate right neural foraminal narrowing. L4-L5: There is mild disc bulge and severe bilateral posterior facet degenerative change with ligamentum flavum hypertrophy causing moderate central canal stenosis with severe left subarticular recess stenosis. There is moderate to severe left neural foraminal narrowing. No significant right foraminal narrowing. L5-S1: There is mild disc bulge and severe bilateral posterior facet degenerative change and ligamentum flavum hypertrophy without significant central canal stenosis. There is impression on the posterolateral aspect of the thecal sac, greater on the left. There is moderate left and mild right neural foraminal narrowing. 1. Advanced degenerative change with multiple disc bulges. Slight grade 1 retrolisthesis at L1-2 and L2-3. 2. Multilevel central canal stenosis as described above, severe at L3-4, moderate at L1-2, L2-3 and L4-5 with severe left subarticular recess stenosis at L4-5. Overall, the findings are worse compared to 07/14/2010.  3. Multilevel neural foraminal stenosis as described above, most severe at L1-2 and L2-3 on the right. 4. Mild old-appearing compression deformity of L2. Hemangiomas in the L2 and L4 vertebral bodies. No acute fracture seen. CT ABDOMEN PELVIS W IV CONTRAST Additional Contrast? None    Result Date: 6/12/2021  EXAMINATION: CT OF THE ABDOMEN AND PELVIS WITH CONTRAST 6/12/2021 5:23 am TECHNIQUE: CT of the abdomen and pelvis was performed with the administration of intravenous contrast. Multiplanar reformatted images are provided for review. Dose modulation, iterative reconstruction, and/or weight based adjustment of the mA/kV was utilized to reduce the radiation dose to as low as reasonably achievable. COMPARISON: 05/24/2021 HISTORY: ORDERING SYSTEM PROVIDED HISTORY: abd pain TECHNOLOGIST PROVIDED HISTORY: Reason for exam:->abd pain Additional Contrast?->None Decision Support Exception - unselect if not a suspected or confirmed emergency medical condition->Emergency Medical Condition (MA) FINDINGS: Mild biliary ductal prominence likely due to post cholecystectomy. Liver is homogeneous. Severe splenomegaly is similar to previous, measuring 18.9 cm in the coronal plane, previously 19 cm. The pancreatic parenchyma appears unremarkable. No adrenal mass. No hydronephrosis. Stable low-attenuation left renal lesion likely representing a cyst.  Calcification in the upper pole of the right kidney is unchanged. Prominent vascular calcifications. Assessment of bowel is limited without oral contrast.  No bowel obstruction. The cecum is mobile. The appendix is not clearly identified. No free fluid, free air or localized fluid collection. Urinary bladder is largely decompressed with Lomax catheter. Gas in the bladder is likely related to the presence of the catheter. Scattered areas of scarring and/or atelectasis in the lung bases. Degenerative changes are present in the spine and pelvis. Marked splenomegaly is similar to previous. Other chronic appearing findings.      CT ABDOMEN PELVIS W IV CONTRAST Additional Contrast? None    Result Date: 5/24/2021  EXAMINATION: CT OF THE ABDOMEN AND PELVIS WITH CONTRAST 5/24/2021 8:10 am TECHNIQUE: CT of the abdomen and pelvis was performed with the administration of intravenous contrast. Multiplanar reformatted images are provided for review. Dose modulation, iterative reconstruction, and/or weight based adjustment of the mA/kV was utilized to reduce the radiation dose to as low as reasonably achievable. COMPARISON: Comparison is dated March 19, 2021 HISTORY: ORDERING SYSTEM PROVIDED HISTORY: pyelo? TECHNOLOGIST PROVIDED HISTORY: Reason for exam:->pyelo? Additional Contrast?->None Decision Support Exception - unselect if not a suspected or confirmed emergency medical condition->Emergency Medical Condition (MA) FINDINGS: No acute abnormality identified in the visualized lung bases Liver demonstrates no focal abnormality. Gallbladder is unremarkable. Spleen is markedly enlarged measuring 20.1 x 13.2 I 14.1 cm. No pancreatic abnormalities are seen. There is normal appearance of the adrenal glands. Kidneys demonstrate symmetric contrast enhancement. No evidence for hydronephrosis No evidence for colonic or small bowel distention. The focus of inflammatory change previously noted in the no longer identified. The appendix is identified and is unremarkable. No free fluid or free air identified. Urinary bladder is unremarkable. Abdominal aorta and major vascular structures are unremarkable. No acute skeletal abnormality identified.      Marked splenomegaly Otherwise no acute findings        Assessment/Plan:      Outpatient Encounter Medications as of 6/21/2021   Medication Sig Dispense Refill    naproxen (NAPROSYN) 500 MG tablet Take 1 tablet by mouth 2 times daily (with meals) (Patient taking differently: Take 500 mg by mouth 2 times daily (with meals) PRN) 60 tablet 4    promethazine (PHENERGAN) 25 MG tablet Take 1 tablet by mouth every 6 hours as No facility-administered encounter medications on file as of 6/21/2021. Jacek Mcnulty was seen today for ed follow-up.     Diagnoses and all orders for this visit:    Spinal stenosis, lumbar region with neurogenic claudication    Controlled type 2 diabetes mellitus with diabetic neuropathy, with long-term current use of insulin ([facility] [address]    Essential hypertension, benign    Urge urinary incontinence         Patient Instructions   Patient was advised to take prescribed medications  Patient declined the advice of seeing pain clinic             Ahsan Lockhart MD   6/21/21

## 2021-06-21 NOTE — PROGRESS NOTES
no goiter, no carotid bruits, no LAD  Lungs:  CTA   Heart:  RRR, no murmurs, gallops or rubs  Abdomen:  Soft/nt/nd, + bowel sounds  Extremities:  No clubbing, cyanosis or edema  Skin: unremarkable    Hemoglobin A1C   Date Value Ref Range Status   02/12/2021 6.5 % Final     Cholesterol, Total   Date Value Ref Range Status   02/12/2021 106 mg/dL Final     Triglycerides   Date Value Ref Range Status   02/12/2021 191 mg/dL Final     HDL   Date Value Ref Range Status   02/12/2021 25 (A) 35 - 70 mg/dL Final     VLDL   Date Value Ref Range Status   02/12/2021 38 mg/dL Final     Chol/HDL Ratio   Date Value Ref Range Status   02/12/2021 4.2  Final     Sodium   Date Value Ref Range Status   06/12/2021 135 132 - 146 mmol/L Final     Potassium   Date Value Ref Range Status   06/12/2021 5.0 3.5 - 5.0 mmol/L Final     Comment:     Specimen is moderately Hemolyzed. Result may be artificially increased. Chloride   Date Value Ref Range Status   06/12/2021 102 98 - 107 mmol/L Final     CO2   Date Value Ref Range Status   06/12/2021 20 (L) 22 - 29 mmol/L Final     BUN   Date Value Ref Range Status   06/12/2021 23 6 - 23 mg/dL Final     CREATININE   Date Value Ref Range Status   06/12/2021 0.9 0.5 - 1.0 mg/dL Final     Glucose   Date Value Ref Range Status   06/12/2021 306 (H) 74 - 99 mg/dL Final     Calcium   Date Value Ref Range Status   06/12/2021 8.7 8.6 - 10.2 mg/dL Final     Total Protein   Date Value Ref Range Status   06/12/2021 6.4 6.4 - 8.3 g/dL Final     Albumin   Date Value Ref Range Status   06/12/2021 3.9 3.5 - 5.2 g/dL Final     Total Bilirubin   Date Value Ref Range Status   06/12/2021 0.4 0.0 - 1.2 mg/dL Final     Alkaline Phosphatase   Date Value Ref Range Status   06/12/2021 65 35 - 104 U/L Final     AST   Date Value Ref Range Status   06/12/2021 31 0 - 31 U/L Final     Comment:     Specimen is moderately Hemolyzed. Result may be artificially increased.      ALT   Date Value Ref Range Status   06/12/2021 11 0 - 32 U/L Final     GFR Non-   Date Value Ref Range Status   06/12/2021 58 >=60 mL/min/1.73 Final     Comment:     Chronic Kidney Disease: less than 60 ml/min/1.73 sq.m. Kidney Failure: less than 15 ml/min/1.73 sq.m. Results valid for patients 18 years and older. GFR    Date Value Ref Range Status   06/12/2021 >60  Final        No results found.      Assessment/Plan:      Outpatient Encounter Medications as of 6/21/2021   Medication Sig Dispense Refill    naproxen (NAPROSYN) 500 MG tablet Take 1 tablet by mouth 2 times daily (with meals) (Patient taking differently: Take 500 mg by mouth 2 times daily (with meals) PRN) 60 tablet 4    promethazine (PHENERGAN) 25 MG tablet Take 1 tablet by mouth every 6 hours as needed for Nausea 10 tablet 0    OneTouch Delica Lancets 84A MISC daily      ONETOUCH ULTRA strip TEST once daily      influenza quadrivalent split vaccine (FLUARIX QUADRIVALENT) 0.5 ML injection Fluarix Quad [id number] (PF) 60 mcg (15 mcg x 4)/0.5 mL IM syringe      Lancets (ONETOUCH DELICA PLUS [id number]) MISC TEST once daily      linagliptin (TRADJENTA) 5 MG tablet Take 1 tablet by mouth daily      glimepiride (AMARYL) 2 MG tablet Take 1 tablet by mouth every morning (before breakfast) 90 tablet 0    naproxen sodium (ALEVE) 220 MG tablet Take 220 mg by mouth 2 times daily as needed for Pain      ferrous sulfate (IRON 325) 325 (65 Fe) MG tablet Take 325 mg by mouth daily (with breakfast)      Menthol, Topical Analgesic, (ICY HOT EX) Apply topically      psyllium (METAMUCIL) 28 % packet Take 1 packet by mouth 2 times daily Take with full glass of h20 or juice PRN      losartan (COZAAR) 100 MG tablet Take 1 tablet by mouth daily 90 tablet 0    amLODIPine (NORVASC) 5 MG tablet Take 1 tablet by mouth daily 90 tablet 0    metoprolol tartrate (LOPRESSOR) 25 MG tablet Take 1 tablet by mouth 2 times daily 180 tablet 0    glycerin 2 g suppository Place 1 suppository rectally daily as needed for Constipation 1 suppository 0    ondansetron (ZOFRAN ODT) 4 MG disintegrating tablet Take 1 tablet by mouth every 8 hours as needed for Nausea or Vomiting May substitute for covered product 10 tablet 0    dicyclomine (BENTYL) 10 MG capsule Take 2 capsules by mouth 4 times daily as needed (abdominal pain) (Patient not taking: Reported on 6/21/2021) 20 capsule 0    [DISCONTINUED] Plecanatide (TRULANCE) 3 MG TABS Take 1 tablet by mouth daily (Patient not taking: Reported on 6/21/2021) 30 tablet 1    fenofibrate (TRIGLIDE) 160 MG tablet Take 160 mg by mouth daily (Patient not taking: Reported on 6/21/2021)      tiZANidine (ZANAFLEX) 4 MG tablet Take 4 mg by mouth every evening (Patient not taking: Reported on 6/21/2021)       No facility-administered encounter medications on file as of 6/21/2021. April Dunaway was seen today for ed follow-up. Diagnoses and all orders for this visit:    Controlled type 2 diabetes mellitus with diabetic neuropathy, with long-term current use of insulin ([facility] [address]    Essential hypertension, benign    Urge urinary incontinence         There are no Patient Instructions on file for this visit.            Ceci Zavala MD   5/6/21

## 2021-07-08 ENCOUNTER — TELEPHONE (OUTPATIENT)
Dept: PRIMARY CARE CLINIC | Age: 86
End: 2021-07-08

## 2021-07-08 DIAGNOSIS — M48.062 SPINAL STENOSIS, LUMBAR REGION WITH NEUROGENIC CLAUDICATION: Primary | ICD-10-CM

## 2021-07-08 RX ORDER — GABAPENTIN 100 MG/1
100 CAPSULE ORAL 2 TIMES DAILY
Qty: 180 CAPSULE | Refills: 0 | Status: SHIPPED
Start: 2021-07-08 | End: 2021-08-23 | Stop reason: SDUPTHER

## 2021-07-08 RX ORDER — AMLODIPINE BESYLATE 5 MG/1
5 TABLET ORAL DAILY
Qty: 90 TABLET | Refills: 0 | Status: SHIPPED
Start: 2021-07-08 | End: 2021-08-23 | Stop reason: SDUPTHER

## 2021-07-08 RX ORDER — GLIMEPIRIDE 2 MG/1
2 TABLET ORAL
Qty: 90 TABLET | Refills: 0 | Status: SHIPPED
Start: 2021-07-08 | End: 2021-08-23 | Stop reason: DRUGHIGH

## 2021-07-08 RX ORDER — FENOFIBRATE 160 MG/1
160 TABLET ORAL DAILY
Qty: 30 TABLET | Refills: 0 | Status: SHIPPED
Start: 2021-07-08 | End: 2021-08-23 | Stop reason: SDUPTHER

## 2021-07-08 RX ORDER — LOSARTAN POTASSIUM 100 MG/1
100 TABLET ORAL DAILY
Qty: 90 TABLET | Refills: 0 | Status: SHIPPED
Start: 2021-07-08 | End: 2021-08-23 | Stop reason: SDUPTHER

## 2021-08-23 ENCOUNTER — TELEPHONE (OUTPATIENT)
Dept: PRIMARY CARE CLINIC | Age: 86
End: 2021-08-23

## 2021-08-23 ENCOUNTER — OFFICE VISIT (OUTPATIENT)
Dept: PRIMARY CARE CLINIC | Age: 86
End: 2021-08-23
Payer: MEDICARE

## 2021-08-23 VITALS
HEART RATE: 67 BPM | DIASTOLIC BLOOD PRESSURE: 70 MMHG | WEIGHT: 165 LBS | SYSTOLIC BLOOD PRESSURE: 132 MMHG | HEIGHT: 57 IN | OXYGEN SATURATION: 98 % | TEMPERATURE: 97.3 F | BODY MASS INDEX: 35.6 KG/M2

## 2021-08-23 DIAGNOSIS — R35.1 NOCTURIA: ICD-10-CM

## 2021-08-23 DIAGNOSIS — E11.40 CONTROLLED TYPE 2 DIABETES MELLITUS WITH DIABETIC NEUROPATHY, WITH LONG-TERM CURRENT USE OF INSULIN (HCC): Primary | ICD-10-CM

## 2021-08-23 DIAGNOSIS — M48.062 SPINAL STENOSIS, LUMBAR REGION WITH NEUROGENIC CLAUDICATION: ICD-10-CM

## 2021-08-23 DIAGNOSIS — E11.65 TYPE 2 DIABETES MELLITUS WITH HYPERGLYCEMIA, WITHOUT LONG-TERM CURRENT USE OF INSULIN (HCC): ICD-10-CM

## 2021-08-23 DIAGNOSIS — Z79.4 CONTROLLED TYPE 2 DIABETES MELLITUS WITH DIABETIC NEUROPATHY, WITH LONG-TERM CURRENT USE OF INSULIN (HCC): Primary | ICD-10-CM

## 2021-08-23 DIAGNOSIS — R53.1 GENERALIZED WEAKNESS: ICD-10-CM

## 2021-08-23 DIAGNOSIS — I10 ESSENTIAL HYPERTENSION: ICD-10-CM

## 2021-08-23 LAB — HBA1C MFR BLD: 7.5 %

## 2021-08-23 PROCEDURE — 99214 OFFICE O/P EST MOD 30 MIN: CPT | Performed by: INTERNAL MEDICINE

## 2021-08-23 PROCEDURE — 83036 HEMOGLOBIN GLYCOSYLATED A1C: CPT | Performed by: INTERNAL MEDICINE

## 2021-08-23 PROCEDURE — 3051F HG A1C>EQUAL 7.0%<8.0%: CPT | Performed by: INTERNAL MEDICINE

## 2021-08-23 RX ORDER — AMLODIPINE BESYLATE 5 MG/1
5 TABLET ORAL DAILY
Qty: 90 TABLET | Refills: 0 | Status: SHIPPED
Start: 2021-08-23 | End: 2021-09-23 | Stop reason: ALTCHOICE

## 2021-08-23 RX ORDER — GABAPENTIN 100 MG/1
100 CAPSULE ORAL 2 TIMES DAILY
Qty: 180 CAPSULE | Refills: 0 | Status: SHIPPED
Start: 2021-08-23 | End: 2021-09-23 | Stop reason: ALTCHOICE

## 2021-08-23 RX ORDER — FENOFIBRATE 160 MG/1
160 TABLET ORAL DAILY
Qty: 90 TABLET | Refills: 0 | Status: ON HOLD
Start: 2021-08-23 | End: 2021-09-25 | Stop reason: HOSPADM

## 2021-08-23 RX ORDER — CLOTRIMAZOLE 10 MG/1
10 LOZENGE ORAL; TOPICAL
Qty: 60 TABLET | Refills: 0 | Status: SHIPPED | OUTPATIENT
Start: 2021-08-23 | End: 2021-09-02

## 2021-08-23 RX ORDER — GLIMEPIRIDE 4 MG/1
4 TABLET ORAL
Qty: 90 TABLET | Refills: 0 | Status: SHIPPED
Start: 2021-08-23 | End: 2021-11-08 | Stop reason: SDUPTHER

## 2021-08-23 RX ORDER — LOSARTAN POTASSIUM 100 MG/1
100 TABLET ORAL DAILY
Qty: 90 TABLET | Refills: 0 | Status: SHIPPED
Start: 2021-08-23 | End: 2021-11-08 | Stop reason: SDUPTHER

## 2021-08-23 NOTE — PATIENT INSTRUCTIONS
Patient was advised to continue current medications except glimepiride 2 mg to be increased to 4 mg tablet daily  Increase fluid intake  Get blood and urine test done  Follow-up in 6 weeks

## 2021-08-23 NOTE — PROGRESS NOTES
Large Adult   Pulse: 67   Temp: 97.3 °F (36.3 °C)   SpO2: 98%   Weight: 165 lb (74.8 kg)   Height: 4' 9\" (1.448 m)       General:  Patient alert and oriented x 3, NAD, pleasant  HEENT:  Atraumatic, normocephalic, PERRLA, EOMI, clear conjunctiva, TMs clear, nose-clear, throat - no erythema  Neck:  Supple, no goiter, no carotid bruits, no LAD  Lungs:  CTA   Heart:  RRR, + syst.murmur, no gallops or rubs  Abdomen:  Soft/nt/nd, + bowel sounds  Extremities:  No clubbing, cyanosis + trace bilateral pedal edema  Skin: unremarkable    Hemoglobin A1C   Date Value Ref Range Status   08/23/2021 7.5 % Final     Cholesterol, Total   Date Value Ref Range Status   02/12/2021 106 mg/dL Final     Triglycerides   Date Value Ref Range Status   02/12/2021 191 mg/dL Final     HDL   Date Value Ref Range Status   02/12/2021 25 (A) 35 - 70 mg/dL Final     LDL Calculated   Date Value Ref Range Status   02/12/2021 43 0 - 160 mg/dL Final     VLDL   Date Value Ref Range Status   02/12/2021 38 mg/dL Final     Chol/HDL Ratio   Date Value Ref Range Status   02/12/2021 4.2  Final     Sodium   Date Value Ref Range Status   06/12/2021 135 132 - 146 mmol/L Final     Potassium   Date Value Ref Range Status   06/12/2021 5.0 3.5 - 5.0 mmol/L Final     Comment:     Specimen is moderately Hemolyzed. Result may be artificially increased.      Chloride   Date Value Ref Range Status   06/12/2021 102 98 - 107 mmol/L Final     CO2   Date Value Ref Range Status   06/12/2021 20 (L) 22 - 29 mmol/L Final     BUN   Date Value Ref Range Status   06/12/2021 23 6 - 23 mg/dL Final     CREATININE   Date Value Ref Range Status   06/12/2021 0.9 0.5 - 1.0 mg/dL Final     Glucose   Date Value Ref Range Status   06/12/2021 306 (H) 74 - 99 mg/dL Final     Calcium   Date Value Ref Range Status   06/12/2021 8.7 8.6 - 10.2 mg/dL Final     Total Protein   Date Value Ref Range Status   06/12/2021 6.4 6.4 - 8.3 g/dL Final     Albumin   Date Value Ref Range Status   06/12/2021 3.9 3.5 - 5.2 g/dL Final     Total Bilirubin   Date Value Ref Range Status   06/12/2021 0.4 0.0 - 1.2 mg/dL Final     Alkaline Phosphatase   Date Value Ref Range Status   06/12/2021 65 35 - 104 U/L Final     AST   Date Value Ref Range Status   06/12/2021 31 0 - 31 U/L Final     Comment:     Specimen is moderately Hemolyzed. Result may be artificially increased. ALT   Date Value Ref Range Status   06/12/2021 11 0 - 32 U/L Final     GFR Non-   Date Value Ref Range Status   06/12/2021 58 >=60 mL/min/1.73 Final     Comment:     Chronic Kidney Disease: less than 60 ml/min/1.73 sq.m. Kidney Failure: less than 15 ml/min/1.73 sq.m. Results valid for patients 18 years and older. GFR    Date Value Ref Range Status   06/12/2021 >60  Final        No results found. Assessment/Plan: Uncontrolled type 2 diabetes mellitus. Please glimepiride to 4 mg tablet daily                                   Controlled hypertension                                   Generalized weakness/nocturia. rule out occult infection (UTI)                                       Outpatient Encounter Medications as of 8/23/2021   Medication Sig Dispense Refill    amLODIPine (NORVASC) 5 MG tablet Take 1 tablet by mouth daily 90 tablet 0    linagliptin (TRADJENTA) 5 MG tablet Take 1 tablet by mouth daily 90 tablet 0    fenofibrate (TRIGLIDE) 160 MG tablet Take 1 tablet by mouth daily 90 tablet 0    gabapentin (NEURONTIN) 100 MG capsule Take 1 capsule by mouth 2 times daily for 90 days.  Intended supply: 90 days 180 capsule 0    glimepiride (AMARYL) 4 MG tablet Take 1 tablet by mouth every morning (before breakfast) 90 tablet 0    losartan (COZAAR) 100 MG tablet Take 1 tablet by mouth daily 90 tablet 0    metoprolol tartrate (LOPRESSOR) 25 MG tablet Take 1 tablet by mouth 2 times daily 180 tablet 0    ONETOUCH ULTRA strip TEST once daily      Lancets (ONETOUCH DELICA PLUS [id number]) MISC TEST once daily  ferrous sulfate (IRON 325) 325 (65 Fe) MG tablet Take 325 mg by mouth daily (with breakfast)      Menthol, Topical Analgesic, (ICY HOT EX) Apply topically      psyllium (METAMUCIL) 28 % packet Take 1 packet by mouth 2 times daily Take with full glass of h20 or juice PRN      [DISCONTINUED] amLODIPine (NORVASC) 5 MG tablet Take 1 tablet by mouth daily 90 tablet 0    [DISCONTINUED] gabapentin (NEURONTIN) 100 MG capsule Take 1 capsule by mouth 2 times daily for 90 days. Intended supply: 90 days 180 capsule 0    [DISCONTINUED] glimepiride (AMARYL) 2 MG tablet Take 1 tablet by mouth every morning (before breakfast) 90 tablet 0    [DISCONTINUED] losartan (COZAAR) 100 MG tablet Take 1 tablet by mouth daily 90 tablet 0    [DISCONTINUED] metoprolol tartrate (LOPRESSOR) 25 MG tablet Take 1 tablet by mouth 2 times daily 180 tablet 0    [DISCONTINUED] fenofibrate (TRIGLIDE) 160 MG tablet Take 1 tablet by mouth daily 30 tablet 0    [DISCONTINUED] gabapentin (NEURONTIN) 100 MG capsule Take 1 capsule by mouth 2 times daily for 180 days.  Intended supply: 90 days 60 capsule 0    naproxen (NAPROSYN) 500 MG tablet Take 1 tablet by mouth 2 times daily (with meals) (Patient taking differently: Take 500 mg by mouth 2 times daily (with meals) PRN) 60 tablet 4    promethazine (PHENERGAN) 25 MG tablet Take 1 tablet by mouth every 6 hours as needed for Nausea 10 tablet 0    [DISCONTINUED] dicyclomine (BENTYL) 10 MG capsule Take 2 capsules by mouth 4 times daily as needed (abdominal pain) (Patient not taking: Reported on 6/21/2021) 20 capsule 0    OneTouch Delica Lancets 05Q MISC daily      influenza quadrivalent split vaccine (FLUARIX QUADRIVALENT) 0.5 ML injection Fluarix Quad [id number] (PF) 60 mcg (15 mcg x 4)/0.5 mL IM syringe (Patient not taking: Reported on 8/23/2021)      [DISCONTINUED] linagliptin (TRADJENTA) 5 MG tablet Take 1 tablet by mouth daily      [DISCONTINUED] glimepiride (AMARYL) 2 MG tablet Take 1 tablet by mouth every morning (before breakfast) 90 tablet 0    naproxen sodium (ALEVE) 220 MG tablet Take 220 mg by mouth 2 times daily as needed for Pain      [DISCONTINUED] tiZANidine (ZANAFLEX) 4 MG tablet Take 4 mg by mouth every evening (Patient not taking: Reported on 6/21/2021)      [DISCONTINUED] losartan (COZAAR) 100 MG tablet Take 1 tablet by mouth daily 90 tablet 0    [DISCONTINUED] amLODIPine (NORVASC) 5 MG tablet Take 1 tablet by mouth daily 90 tablet 0    [DISCONTINUED] metoprolol tartrate (LOPRESSOR) 25 MG tablet Take 1 tablet by mouth 2 times daily 180 tablet 0    glycerin 2 g suppository Place 1 suppository rectally daily as needed for Constipation (Patient not taking: Reported on 8/23/2021) 1 suppository 0    ondansetron (ZOFRAN ODT) 4 MG disintegrating tablet Take 1 tablet by mouth every 8 hours as needed for Nausea or Vomiting May substitute for covered product 10 tablet 0     No facility-administered encounter medications on file as of 8/23/2021. Tacey Race was seen today for diabetes, hypertension and other. Diagnoses and all orders for this visit:    Controlled type 2 diabetes mellitus with diabetic neuropathy, with long-term current use of insulin ([facility])  -     POCT glycosylated hemoglobin (Hb A1C)  -     linagliptin (TRADJENTA) 5 MG tablet; Take 1 tablet by mouth daily  -     glimepiride (AMARYL) 4 MG tablet; Take 1 tablet by mouth every morning (before breakfast)    Spinal stenosis, lumbar region with neurogenic claudication  -     amLODIPine (NORVASC) 5 MG tablet; Take 1 tablet by mouth daily  -     gabapentin (NEURONTIN) 100 MG capsule; Take 1 capsule by mouth 2 times daily for 90 days. Intended supply: 90 days    Essential hypertension  -     amLODIPine (NORVASC) 5 MG tablet; Take 1 tablet by mouth daily  -     losartan (COZAAR) 100 MG tablet; Take 1 tablet by mouth daily  -     metoprolol tartrate (LOPRESSOR) 25 MG tablet;  Take 1 tablet by mouth 2 times daily  -     CBC WITH AUTO DIFFERENTIAL; Future  -     URINALYSIS; Future  -     COMPREHENSIVE METABOLIC PANEL; Future    Generalized weakness  -     Culture, Blood 2; Future    Nocturia  -     Culture, Urine; Future    Type 2 diabetes mellitus with hyperglycemia, without long-term current use of insulin ([facility])    Other orders  -     fenofibrate (TRIGLIDE) 160 MG tablet;  Take 1 tablet by mouth daily         Patient Instructions   Patient was advised to continue current medications except glimepiride 2 mg to be increased to 4 mg tablet daily  Increase fluid intake  Get blood and urine test done  Follow-up in 6 weeks             Tracy Baez MD   8/23/21

## 2021-08-29 ENCOUNTER — APPOINTMENT (OUTPATIENT)
Dept: CT IMAGING | Age: 86
End: 2021-08-29
Payer: MEDICARE

## 2021-08-29 ENCOUNTER — HOSPITAL ENCOUNTER (EMERGENCY)
Age: 86
Discharge: HOME | End: 2021-08-29
Attending: STUDENT IN AN ORGANIZED HEALTH CARE EDUCATION/TRAINING PROGRAM
Payer: MEDICARE

## 2021-08-29 VITALS
OXYGEN SATURATION: 98 % | TEMPERATURE: 97.6 F | WEIGHT: 169 LBS | DIASTOLIC BLOOD PRESSURE: 72 MMHG | SYSTOLIC BLOOD PRESSURE: 169 MMHG | RESPIRATION RATE: 17 BRPM | BODY MASS INDEX: 36.46 KG/M2 | HEIGHT: 57 IN | HEART RATE: 87 BPM

## 2021-08-29 DIAGNOSIS — R51.9 NONINTRACTABLE HEADACHE, UNSPECIFIED CHRONICITY PATTERN, UNSPECIFIED HEADACHE TYPE: Primary | ICD-10-CM

## 2021-08-29 LAB
ALBUMIN SERPL-MCNC: 4.1 G/DL (ref 3.5–5.2)
ALP BLD-CCNC: 80 U/L (ref 35–104)
ALT SERPL-CCNC: 12 U/L (ref 0–32)
ANION GAP SERPL CALCULATED.3IONS-SCNC: 12 MMOL/L (ref 7–16)
AST SERPL-CCNC: 33 U/L (ref 0–31)
BACTERIA: ABNORMAL /HPF
BASOPHILS ABSOLUTE: 0.05 E9/L (ref 0–0.2)
BASOPHILS RELATIVE PERCENT: 0.5 % (ref 0–2)
BILIRUB SERPL-MCNC: 0.5 MG/DL (ref 0–1.2)
BILIRUBIN URINE: NEGATIVE
BLOOD, URINE: ABNORMAL
BUN BLDV-MCNC: 16 MG/DL (ref 6–23)
CALCIUM SERPL-MCNC: 9.4 MG/DL (ref 8.6–10.2)
CHLORIDE BLD-SCNC: 104 MMOL/L (ref 98–107)
CLARITY: CLEAR
CO2: 22 MMOL/L (ref 22–29)
COLOR: ABNORMAL
CREAT SERPL-MCNC: 0.8 MG/DL (ref 0.5–1)
EKG ATRIAL RATE: 71 BPM
EKG P AXIS: 62 DEGREES
EKG P-R INTERVAL: 134 MS
EKG Q-T INTERVAL: 438 MS
EKG QRS DURATION: 88 MS
EKG QTC CALCULATION (BAZETT): 475 MS
EKG R AXIS: -17 DEGREES
EKG T AXIS: 66 DEGREES
EKG VENTRICULAR RATE: 71 BPM
EOSINOPHILS ABSOLUTE: 0.11 E9/L (ref 0.05–0.5)
EOSINOPHILS RELATIVE PERCENT: 1 % (ref 0–6)
EPITHELIAL CELLS, UA: ABNORMAL /HPF
GFR AFRICAN AMERICAN: >60
GFR NON-AFRICAN AMERICAN: >60 ML/MIN/1.73
GLUCOSE BLD-MCNC: 265 MG/DL (ref 74–99)
GLUCOSE URINE: NEGATIVE MG/DL
HCT VFR BLD CALC: 43.1 % (ref 34–48)
HEMOGLOBIN: 14.3 G/DL (ref 11.5–15.5)
IMMATURE GRANULOCYTES #: 0.23 E9/L
IMMATURE GRANULOCYTES %: 2.1 % (ref 0–5)
KETONES, URINE: NEGATIVE MG/DL
LEUKOCYTE ESTERASE, URINE: ABNORMAL
LYMPHOCYTES ABSOLUTE: 2.22 E9/L (ref 1.5–4)
LYMPHOCYTES RELATIVE PERCENT: 20.6 % (ref 20–42)
MCH RBC QN AUTO: 28.5 PG (ref 26–35)
MCHC RBC AUTO-ENTMCNC: 33.2 % (ref 32–34.5)
MCV RBC AUTO: 86 FL (ref 80–99.9)
MONOCYTES ABSOLUTE: 0.62 E9/L (ref 0.1–0.95)
MONOCYTES RELATIVE PERCENT: 5.8 % (ref 2–12)
NEUTROPHILS ABSOLUTE: 7.54 E9/L (ref 1.8–7.3)
NEUTROPHILS RELATIVE PERCENT: 70 % (ref 43–80)
NITRITE, URINE: NEGATIVE
PDW BLD-RTO: 14.5 FL (ref 11.5–15)
PH UA: 6.5 (ref 5–9)
PLATELET # BLD: 292 E9/L (ref 130–450)
PMV BLD AUTO: 11.4 FL (ref 7–12)
POTASSIUM REFLEX MAGNESIUM: 4.9 MMOL/L (ref 3.5–5)
PROTEIN UA: 30 MG/DL
RBC # BLD: 5.01 E12/L (ref 3.5–5.5)
RBC UA: ABNORMAL /HPF (ref 0–2)
REASON FOR REJECTION: NORMAL
REJECTED TEST: NORMAL
SODIUM BLD-SCNC: 138 MMOL/L (ref 132–146)
SPECIFIC GRAVITY UA: 1.01 (ref 1–1.03)
TOTAL PROTEIN: 7.6 G/DL (ref 6.4–8.3)
TROPONIN, HIGH SENSITIVITY: 21 NG/L (ref 0–9)
TROPONIN, HIGH SENSITIVITY: 22 NG/L (ref 0–9)
UROBILINOGEN, URINE: 0.2 E.U./DL
WBC # BLD: 10.8 E9/L (ref 4.5–11.5)
WBC UA: ABNORMAL /HPF (ref 0–5)

## 2021-08-29 PROCEDURE — 96374 THER/PROPH/DIAG INJ IV PUSH: CPT

## 2021-08-29 PROCEDURE — 70450 CT HEAD/BRAIN W/O DYE: CPT

## 2021-08-29 PROCEDURE — 80053 COMPREHEN METABOLIC PANEL: CPT

## 2021-08-29 PROCEDURE — 84484 ASSAY OF TROPONIN QUANT: CPT

## 2021-08-29 PROCEDURE — 81001 URINALYSIS AUTO W/SCOPE: CPT

## 2021-08-29 PROCEDURE — 6360000002 HC RX W HCPCS: Performed by: STUDENT IN AN ORGANIZED HEALTH CARE EDUCATION/TRAINING PROGRAM

## 2021-08-29 PROCEDURE — 93005 ELECTROCARDIOGRAM TRACING: CPT | Performed by: STUDENT IN AN ORGANIZED HEALTH CARE EDUCATION/TRAINING PROGRAM

## 2021-08-29 PROCEDURE — 36415 COLL VENOUS BLD VENIPUNCTURE: CPT

## 2021-08-29 PROCEDURE — 2580000003 HC RX 258: Performed by: STUDENT IN AN ORGANIZED HEALTH CARE EDUCATION/TRAINING PROGRAM

## 2021-08-29 PROCEDURE — 85025 COMPLETE CBC W/AUTO DIFF WBC: CPT

## 2021-08-29 PROCEDURE — 93010 ELECTROCARDIOGRAM REPORT: CPT | Performed by: INTERNAL MEDICINE

## 2021-08-29 PROCEDURE — 99284 EMERGENCY DEPT VISIT MOD MDM: CPT

## 2021-08-29 RX ORDER — 0.9 % SODIUM CHLORIDE 0.9 %
500 INTRAVENOUS SOLUTION INTRAVENOUS ONCE
Status: COMPLETED | OUTPATIENT
Start: 2021-08-29 | End: 2021-08-29

## 2021-08-29 RX ORDER — SODIUM CHLORIDE 9 MG/ML
INJECTION, SOLUTION INTRAVENOUS
Status: DISCONTINUED
Start: 2021-08-29 | End: 2021-08-30 | Stop reason: HOSPADM

## 2021-08-29 RX ORDER — PROCHLORPERAZINE EDISYLATE 5 MG/ML
10 INJECTION INTRAMUSCULAR; INTRAVENOUS ONCE
Status: COMPLETED | OUTPATIENT
Start: 2021-08-29 | End: 2021-08-29

## 2021-08-29 RX ADMIN — SODIUM CHLORIDE 500 ML: 9 INJECTION, SOLUTION INTRAVENOUS at 19:05

## 2021-08-29 RX ADMIN — PROCHLORPERAZINE EDISYLATE 10 MG: 5 INJECTION INTRAMUSCULAR; INTRAVENOUS at 19:05

## 2021-08-29 ASSESSMENT — ENCOUNTER SYMPTOMS
PHOTOPHOBIA: 0
NAUSEA: 1
DIARRHEA: 0
VOMITING: 0
CHEST TIGHTNESS: 0
ABDOMINAL PAIN: 0
SHORTNESS OF BREATH: 0
ABDOMINAL DISTENTION: 0
COUGH: 0

## 2021-08-29 ASSESSMENT — PAIN DESCRIPTION - PAIN TYPE: TYPE: ACUTE PAIN

## 2021-08-29 ASSESSMENT — PAIN DESCRIPTION - LOCATION: LOCATION: HEAD

## 2021-08-29 ASSESSMENT — PAIN SCALES - GENERAL: PAINLEVEL_OUTOF10: 8

## 2021-08-29 ASSESSMENT — PAIN DESCRIPTION - DESCRIPTORS: DESCRIPTORS: ACHING;THROBBING

## 2021-08-29 NOTE — ED NOTES
Report given to oncoming nurse.       Deion Trinidad, [address] [facility]  08/29/21 [id number]

## 2021-08-29 NOTE — ED PROVIDER NOTES
Poppy Henriquez is a 80year old female with PMH of DM, HTN, bowel obstruction who presented to ED with concerns for headache and dizziness. Patient stated that she was taking a nap and woke up around 2 PM with a headache and dizziness. Patient states symptoms present all the time nothing makes it better or worse. Patient stated that she does not have worsening symptoms with change in position or turning her head  Patient denies a history of headaches or dizziness. Patient is also having nausea without vomiting. Patient denies fever, chills, chest pain, shortness of breath. Nothing symptoms better or worse. Symptoms are moderate in severity and constant. Patient is having trouble ambulating due to dizziness. Patient did take all of her blood pressure medication this morning. Patient denies any vision changes. Patient denies trauma and is not on anticoagulation. Patient lives with daughter who is at bedside. The history is provided by the patient, medical records and a relative. Review of Systems   Constitutional: Positive for fatigue. Negative for chills, diaphoresis and fever. Eyes: Negative for photophobia and visual disturbance. Respiratory: Negative for cough, chest tightness and shortness of breath. Cardiovascular: Negative for chest pain, palpitations and leg swelling. Gastrointestinal: Positive for nausea. Negative for abdominal distention, abdominal pain, diarrhea and vomiting. Genitourinary: Negative for dysuria. Musculoskeletal: Negative for neck pain and neck stiffness. Skin: Negative for pallor and rash. Neurological: Positive for dizziness and headaches. Psychiatric/Behavioral: Negative for confusion. Physical Exam  Vitals and nursing note reviewed. Constitutional:       General: She is in acute distress. Comments: Moderate distress due to HA and nausea   HENT:      Head: Normocephalic and atraumatic. Eyes:      General: No scleral icterus. Conjunctiva/sclera: Conjunctivae normal.      Pupils: Pupils are equal, round, and reactive to light. Cardiovascular:      Rate and Rhythm: Normal rate and regular rhythm. Pulmonary:      Effort: Pulmonary effort is normal.      Breath sounds: Normal breath sounds. Abdominal:      General: Bowel sounds are normal. There is no distension. Palpations: Abdomen is soft. Tenderness: There is no abdominal tenderness. There is no guarding or rebound. Musculoskeletal:      Cervical back: Normal range of motion and neck supple. No rigidity. No muscular tenderness. Right lower leg: No edema. Left lower leg: No edema. Skin:     General: Skin is warm and dry. Capillary Refill: Capillary refill takes less than 2 seconds. Coloration: Skin is not pale. Findings: No erythema or rash. Neurological:      General: No focal deficit present. Mental Status: She is alert and oriented to person, place, and time. Cranial Nerves: No cranial nerve deficit. Sensory: No sensory deficit. Motor: No weakness. Coordination: Coordination normal.   Psychiatric:         Mood and Affect: Mood normal.          Procedures     MDM  Number of Diagnoses or Management Options  Nonintractable headache, unspecified chronicity pattern, unspecified headache type  Diagnosis management comments: Rocio Tovar is a 51-year-old female present emergency department concern for headache and dizziness. Patient's symptoms started suddenly and are present all the time and severe at onset. Patient woke up with symptoms. Patient was able to ambulate with symptoms but was having difficulty. Patient was given Compazine and sent over immediately for CT scan. CAT scan was unremarkable for acute process contributing patient symptoms. Patient felt complete resolution of symptoms with the Compazine.   Remainder of patient's lab work was significant for intermediate troponin that was unchanged repeat troponin. Patient is not having any chest pain or shortness of breath. Patient did not have any ataxia on exam had normal neurological exam and repeat normal neurological exam.  Patient is 80years old and does have significant risk factors. Although patient symptoms have resolved completely I did discuss with patient and daughter that we could offer observation admission and further monitoring in the hospital overnight versus discharge home. Patient was able to ambulate normally and did not have any findings concerning of UTI or other findings contributing to patient's symptoms. Advised patient's daughter that it is likely peripheral vertigo as symptoms did resolve and her exam was reassuring however central cause cannot be completely ruled out and possible with symptoms resolving that this could still have been TIA or other process. Patient's daughter would like to bring patient home and patient does not want to stay in hospital.  Advised him to return immediately to emergency department if any symptoms worsen or return they are agreeable to plan of patient is well-appearing nontoxic at time of discharge           EKG: This EKG is signed and interpreted by me. Rate: 71  Rhythm: Sinus  Interpretation: non-specific EKG  Comparison: stable as compared to patient's most recent EKG         --------------------------------------------- PAST HISTORY ---------------------------------------------  Past Medical History:  has a past medical history of Arthritis, Bowel obstruction ([address] Diabetes mellitus ([address] Diverticulosis, Hyperlipidemia, Hypertension, Irritable bowel syndrome, Peptic ulcer disease, Spinal stenosis, Syncope and collapse, and Type 2 diabetes mellitus without complication ([address] Past Surgical History:  has a past surgical history that includes Hysterectomy; Cholecystectomy; Tonsillectomy; Colon surgery;  Appendectomy; Breast surgery; joint replacement; ECHO Compl W Dop Color Flow (4/22/2013); Thyroid surgery; Small intestine surgery; Knee arthroscopy; Total knee arthroplasty (Right); Colonoscopy (08/2012); and pre-malignant / benign skin lesion excision (Left, 2/4/2020). Social History:  reports that she has never smoked. She has never used smokeless tobacco. She reports that she does not drink alcohol and does not use drugs. Family History: family history includes Cancer in her father. The patients home medications have been reviewed.     Allergies: Codeine and Morphine    -------------------------------------------------- RESULTS -------------------------------------------------  Labs:  Results for orders placed or performed during the hospital encounter of 08/29/21   CBC auto differential   Result Value Ref Range    WBC 10.8 4.5 - 11.5 E9/L    RBC 5.01 3.50 - 5.50 E12/L    Hemoglobin 14.3 11.5 - 15.5 g/dL    Hematocrit 43.1 34.0 - 48.0 %    MCV 86.0 80.0 - 99.9 fL    MCH 28.5 26.0 - 35.0 pg    MCHC 33.2 32.0 - 34.5 %    RDW 14.5 11.5 - 15.0 fL    Platelets 608 114 - 513 E9/L    MPV 11.4 7.0 - 12.0 fL    Neutrophils % 70.0 43.0 - 80.0 %    Immature Granulocytes % 2.1 0.0 - 5.0 %    Lymphocytes % 20.6 20.0 - 42.0 %    Monocytes % 5.8 2.0 - 12.0 %    Eosinophils % 1.0 0.0 - 6.0 %    Basophils % 0.5 0.0 - 2.0 %    Neutrophils Absolute 7.54 (H) 1.80 - 7.30 E9/L    Immature Granulocytes # 0.23 E9/L    Lymphocytes Absolute 2.22 1.50 - 4.00 E9/L    Monocytes Absolute 0.62 0.10 - 0.95 E9/L    Eosinophils Absolute 0.11 0.05 - 0.50 E9/L    Basophils Absolute 0.05 0.00 - 0.20 E9/L   Comprehensive Metabolic Panel w/ Reflex to MG   Result Value Ref Range    Sodium 138 132 - 146 mmol/L    Potassium reflex Magnesium 4.9 3.5 - 5.0 mmol/L    Chloride 104 98 - 107 mmol/L    CO2 22 22 - 29 mmol/L    Anion Gap 12 7 - 16 mmol/L    Glucose 265 (H) 74 - 99 mg/dL    BUN 16 6 - 23 mg/dL    CREATININE 0.8 0.5 - 1.0 mg/dL    GFR Non-African American >60 >=60 mL/min/1.73    GFR African American >60     Calcium 9.4 8.6 - 10.2 mg/dL    Total Protein 7.6 6.4 - 8.3 g/dL    Albumin 4.1 3.5 - 5.2 g/dL    Total Bilirubin 0.5 0.0 - 1.2 mg/dL    Alkaline Phosphatase 80 35 - 104 U/L    ALT 12 0 - 32 U/L    AST 33 (H) 0 - 31 U/L   SPECIMEN REJECTION   Result Value Ref Range    Rejected Test trop     Reason for Rejection see below    Troponin   Result Value Ref Range    Troponin, High Sensitivity 21 (H) 0 - 9 ng/L   Urinalysis with Microscopic   Result Value Ref Range    Color, UA Straw Straw/Yellow    Clarity, UA Clear Clear    Glucose, Ur Negative Negative mg/dL    Bilirubin Urine Negative Negative    Ketones, Urine Negative Negative mg/dL    Specific Gravity, UA 1.010 1.005 - 1.030    Blood, Urine TRACE (A) Negative    pH, UA 6.5 5.0 - 9.0    Protein, UA 30 (A) Negative mg/dL    Urobilinogen, Urine 0.2 <2.0 E.U./dL    Nitrite, Urine Negative Negative    Leukocyte Esterase, Urine SMALL (A) Negative    WBC, UA 0-1 0 - 5 /HPF    RBC, UA 0-1 0 - 2 /HPF    Epithelial Cells, UA RARE /HPF    Bacteria, UA NONE SEEN None Seen /HPF   Troponin   Result Value Ref Range    Troponin, High Sensitivity 22 (H) 0 - 9 ng/L   EKG 12 Lead   Result Value Ref Range    Ventricular Rate 71 BPM    Atrial Rate 71 BPM    P-R Interval 134 ms    QRS Duration 88 ms    Q-T Interval 438 ms    QTc Calculation (Bazett) 475 ms    P Axis 62 degrees    R Axis -17 degrees    T Axis 66 degrees       Radiology:  CT HEAD WO CONTRAST   Final Result   No acute intracranial abnormality. 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 ------------------------- NURSING NOTES AND VITALS REVIEWED ---------------------------  Date / Time Roomed:  8/29/2021  5:04 PM  ED Bed Assignment:  [id number]    The nursing notes within the ED encounter and vital signs as below have been reviewed.    BP (!) 169/72   Pulse 87   Temp 97.6 °F (36.4 °C) (Tympanic)   Resp 17   Ht 4' 9\" (1.448 m)   Wt 169 lb (76.7 kg)   SpO2 98%   BMI 36.57 kg/m²   Oxygen Saturation Interpretation: Normal      ------------------------------------------ PROGRESS NOTES ------------------------------------------  10:35 AM EDT  I have spoken with the patient and daughter and discussed todays results, in addition to providing specific details for the plan of care and counseling regarding the diagnosis and prognosis. Their questions are answered at this time and they are agreeable with the plan. I discussed at length with them reasons for immediate return here for re evaluation. They will followup with their primary care physician by calling their office tomorrow. --------------------------------- ADDITIONAL PROVIDER NOTES ---------------------------------  At this time the patient is without objective evidence of an acute process requiring hospitalization or inpatient management. They have remained hemodynamically stable throughout their entire ED visit and are stable for discharge with outpatient follow-up. The plan has been discussed in detail and they are aware of the specific conditions for emergent return, as well as the importance of follow-up. Discharge Medication List as of 8/29/2021 11:20 PM          Diagnosis:  1. Nonintractable headache, unspecified chronicity pattern, unspecified headache type        Disposition:  Patient's disposition: Discharge to home  Patient's condition is stable.           Ramila Bates MD  Resident  08/31/21 [id number]

## 2021-08-30 NOTE — ED NOTES
Patient became dizzy upon sitting on side of bed. Orthostatic VS obtained. After approximately 5 minutes, patient able to ambulate with assistance to the bathroom without difficulty.      Ezio Parmar RN  08/29/21 [id number]

## 2021-09-13 ENCOUNTER — TELEPHONE (OUTPATIENT)
Dept: PRIMARY CARE CLINIC | Age: 86
End: 2021-09-13

## 2021-09-14 ENCOUNTER — OFFICE VISIT (OUTPATIENT)
Dept: PRIMARY CARE CLINIC | Age: 86
End: 2021-09-14
Payer: MEDICARE

## 2021-09-14 ENCOUNTER — TELEPHONE (OUTPATIENT)
Dept: PRIMARY CARE CLINIC | Age: 86
End: 2021-09-14

## 2021-09-14 VITALS
HEART RATE: 77 BPM | HEIGHT: 57 IN | WEIGHT: 165.2 LBS | DIASTOLIC BLOOD PRESSURE: 54 MMHG | OXYGEN SATURATION: 96 % | SYSTOLIC BLOOD PRESSURE: 140 MMHG | TEMPERATURE: 97.2 F | BODY MASS INDEX: 35.64 KG/M2

## 2021-09-14 DIAGNOSIS — B37.0 ORAL THRUSH: Primary | ICD-10-CM

## 2021-09-14 PROBLEM — D68.9 COAGULOPATHY (HCC): Status: ACTIVE | Noted: 2019-10-27

## 2021-09-14 PROBLEM — I61.4 LEFT-SIDED NONTRAUMATIC INTRACEREBRAL HEMORRHAGE OF CEREBELLUM (HCC): Status: ACTIVE | Noted: 2019-10-26

## 2021-09-14 PROCEDURE — 99212 OFFICE O/P EST SF 10 MIN: CPT | Performed by: STUDENT IN AN ORGANIZED HEALTH CARE EDUCATION/TRAINING PROGRAM

## 2021-09-14 RX ORDER — CLOTRIMAZOLE 10 MG/1
10 LOZENGE ORAL; TOPICAL
Qty: 50 TABLET | Refills: 0 | Status: ON HOLD
Start: 2021-09-14 | End: 2021-09-25 | Stop reason: HOSPADM

## 2021-09-14 RX ORDER — NYSTATIN AND TRIAMCINOLONE ACETONIDE 100000; 1 [USP'U]/G; MG/G
1 OINTMENT TOPICAL 2 TIMES DAILY PRN
COMMUNITY
Start: 2021-09-11 | End: 2022-03-10 | Stop reason: ALTCHOICE

## 2021-09-14 ASSESSMENT — ENCOUNTER SYMPTOMS: RESPIRATORY NEGATIVE: 1

## 2021-09-14 NOTE — PROGRESS NOTES
Timothy Gee (:  3/26/1928) is a 80 y.o. female,Established patient, here for evaluation of the following chief complaint(s): Other (was seen in August for thrush in mouth which has reoccurred. patient states she is not feeling well getting dizzy when she gets up after eating breakfast.  self glucose 250. family states this is a good number for her.  )         ASSESSMENT/PLAN:  1. Oral thrush  Assessment & Plan:   -Recurrent  -Will re-prescribe clotrimazole trouches 5 time a day x10 days  -Advised patient to use lozenges or chew sugar free gum if her mouth becomes dry  Orders:  -     clotrimazole (MYCELEX) 10 MG cruz; Take 1 tablet by mouth 5 times daily for 10 days, Disp-50 tablet, R-0Normal      No follow-ups on file. Subjective   SUBJECTIVE/OBJECTIVE:  HPI     Patient is a 79 y/o F with a PMHx of HTN and T2DM who presents with recurrent oral thrush. Patient was seen in the office in August and diagnosed with oral thrush and started on clotrimazole trouches. She took this medication for 7 days then stopped when her thrush resolved. Symptoms returned 3 days ago and patient took the remaining 3 days of clotrimazole. This helped but still having some mouth pain. Denies difficulty swallowing. Reports her blood sugars have been good for her. Occasionally has a reading over 250. Is not on any inhalers. Review of Systems   Constitutional: Negative. HENT: Positive for mouth sores. Negative for dental problem. Respiratory: Negative. Cardiovascular: Negative. Endocrine: Negative. Neurological: Negative. Objective   Physical Exam  Vitals reviewed. Constitutional:       Appearance: Normal appearance. HENT:      Head: Normocephalic and atraumatic. Mouth/Throat:      Comments: Few scattered area of white exudate that can be easily scraped off around the tongue  Eyes:      General:         Right eye: No discharge. Left eye: No discharge.    Cardiovascular: Rate and Rhythm: Normal rate and regular rhythm. Pulses: Normal pulses. Heart sounds: Normal heart sounds. Pulmonary:      Effort: Pulmonary effort is normal.      Breath sounds: Normal breath sounds. Skin:     General: Skin is warm and dry. Neurological:      Mental Status: She is alert. An electronic signature was used to authenticate this note.     --Jocelyne Wiggins MD

## 2021-09-14 NOTE — ASSESSMENT & PLAN NOTE
-Recurrent  -Will re-prescribe clotrimazole trouches 5 time a day x10 days  -Advised patient to use lozenges or chew sugar free gum if her mouth becomes dry

## 2021-09-23 ENCOUNTER — HOSPITAL ENCOUNTER (INPATIENT)
Age: 86
LOS: 2 days | Discharge: HOME HEALTH | DRG: 065 | End: 2021-09-25
Attending: EMERGENCY MEDICINE | Admitting: INTERNAL MEDICINE
Payer: MEDICARE

## 2021-09-23 ENCOUNTER — APPOINTMENT (OUTPATIENT)
Dept: ULTRASOUND IMAGING | Age: 86
DRG: 065 | End: 2021-09-23
Payer: MEDICARE

## 2021-09-23 ENCOUNTER — APPOINTMENT (OUTPATIENT)
Dept: GENERAL RADIOLOGY | Age: 86
DRG: 065 | End: 2021-09-23
Payer: MEDICARE

## 2021-09-23 ENCOUNTER — APPOINTMENT (OUTPATIENT)
Dept: CT IMAGING | Age: 86
DRG: 065 | End: 2021-09-23
Payer: MEDICARE

## 2021-09-23 DIAGNOSIS — E87.6 HYPOKALEMIA: ICD-10-CM

## 2021-09-23 DIAGNOSIS — R77.8 ELEVATED TROPONIN: ICD-10-CM

## 2021-09-23 DIAGNOSIS — I10 ESSENTIAL HYPERTENSION: ICD-10-CM

## 2021-09-23 DIAGNOSIS — R30.0 DYSURIA: ICD-10-CM

## 2021-09-23 DIAGNOSIS — R51.9 ACUTE NONINTRACTABLE HEADACHE, UNSPECIFIED HEADACHE TYPE: Primary | ICD-10-CM

## 2021-09-23 DIAGNOSIS — R73.9 HYPERGLYCEMIA: ICD-10-CM

## 2021-09-23 DIAGNOSIS — R47.81 SLURRED SPEECH: ICD-10-CM

## 2021-09-23 LAB
ALBUMIN SERPL-MCNC: 3.1 G/DL (ref 3.5–5.2)
ALP BLD-CCNC: 52 U/L (ref 35–104)
ALT SERPL-CCNC: 7 U/L (ref 0–32)
ANION GAP SERPL CALCULATED.3IONS-SCNC: 8 MMOL/L (ref 7–16)
ANION GAP SERPL CALCULATED.3IONS-SCNC: 9 MMOL/L (ref 7–16)
AST SERPL-CCNC: 13 U/L (ref 0–31)
BACTERIA: ABNORMAL /HPF
BASOPHILS ABSOLUTE: 0.04 E9/L (ref 0–0.2)
BASOPHILS RELATIVE PERCENT: 0.5 % (ref 0–2)
BETA-HYDROXYBUTYRATE: 0.14 MMOL/L (ref 0.02–0.27)
BILIRUB SERPL-MCNC: 0.3 MG/DL (ref 0–1.2)
BILIRUBIN URINE: NEGATIVE
BLOOD, URINE: ABNORMAL
BUN BLDV-MCNC: 16 MG/DL (ref 6–23)
BUN BLDV-MCNC: 19 MG/DL (ref 6–23)
CALCIUM SERPL-MCNC: 7.1 MG/DL (ref 8.6–10.2)
CALCIUM SERPL-MCNC: 8.7 MG/DL (ref 8.6–10.2)
CHLORIDE BLD-SCNC: 115 MMOL/L (ref 98–107)
CHLORIDE BLD-SCNC: 118 MMOL/L (ref 98–107)
CLARITY: CLEAR
CO2: 17 MMOL/L (ref 22–29)
CO2: 22 MMOL/L (ref 22–29)
COLOR: YELLOW
CREAT SERPL-MCNC: 0.7 MG/DL (ref 0.5–1)
CREAT SERPL-MCNC: 0.8 MG/DL (ref 0.5–1)
EKG ATRIAL RATE: 83 BPM
EKG P AXIS: 43 DEGREES
EKG P-R INTERVAL: 136 MS
EKG Q-T INTERVAL: 412 MS
EKG QRS DURATION: 90 MS
EKG QTC CALCULATION (BAZETT): 484 MS
EKG R AXIS: -25 DEGREES
EKG T AXIS: 90 DEGREES
EKG VENTRICULAR RATE: 83 BPM
EOSINOPHILS ABSOLUTE: 0.15 E9/L (ref 0.05–0.5)
EOSINOPHILS RELATIVE PERCENT: 1.9 % (ref 0–6)
EPITHELIAL CELLS, UA: ABNORMAL /HPF
GFR AFRICAN AMERICAN: >60
GFR AFRICAN AMERICAN: >60
GFR NON-AFRICAN AMERICAN: >60 ML/MIN/1.73
GFR NON-AFRICAN AMERICAN: >60 ML/MIN/1.73
GLUCOSE BLD-MCNC: 179 MG/DL (ref 74–99)
GLUCOSE BLD-MCNC: 224 MG/DL (ref 74–99)
GLUCOSE URINE: 250 MG/DL
HCT VFR BLD CALC: 40.8 % (ref 34–48)
HEMOGLOBIN: 12.9 G/DL (ref 11.5–15.5)
IMMATURE GRANULOCYTES #: 0.17 E9/L
IMMATURE GRANULOCYTES %: 2.1 % (ref 0–5)
KETONES, URINE: NEGATIVE MG/DL
LACTIC ACID: 2.1 MMOL/L (ref 0.5–2.2)
LEUKOCYTE ESTERASE, URINE: NEGATIVE
LYMPHOCYTES ABSOLUTE: 1.85 E9/L (ref 1.5–4)
LYMPHOCYTES RELATIVE PERCENT: 23 % (ref 20–42)
MAGNESIUM: 1.5 MG/DL (ref 1.6–2.6)
MCH RBC QN AUTO: 28.7 PG (ref 26–35)
MCHC RBC AUTO-ENTMCNC: 31.6 % (ref 32–34.5)
MCV RBC AUTO: 90.7 FL (ref 80–99.9)
METER GLUCOSE: 197 MG/DL (ref 74–99)
MONOCYTES ABSOLUTE: 0.61 E9/L (ref 0.1–0.95)
MONOCYTES RELATIVE PERCENT: 7.6 % (ref 2–12)
NEUTROPHILS ABSOLUTE: 5.23 E9/L (ref 1.8–7.3)
NEUTROPHILS RELATIVE PERCENT: 64.9 % (ref 43–80)
NITRITE, URINE: NEGATIVE
PDW BLD-RTO: 14.2 FL (ref 11.5–15)
PH UA: 6.5 (ref 5–9)
PH VENOUS: 7.33 (ref 7.35–7.45)
PHOSPHORUS: 2.7 MG/DL (ref 2.5–4.5)
PLATELET # BLD: 196 E9/L (ref 130–450)
PMV BLD AUTO: 12 FL (ref 7–12)
POTASSIUM SERPL-SCNC: 3.2 MMOL/L (ref 3.5–5)
POTASSIUM SERPL-SCNC: 4.4 MMOL/L (ref 3.5–5)
PROTEIN UA: 100 MG/DL
RBC # BLD: 4.5 E12/L (ref 3.5–5.5)
RBC UA: ABNORMAL /HPF (ref 0–2)
SODIUM BLD-SCNC: 144 MMOL/L (ref 132–146)
SODIUM BLD-SCNC: 145 MMOL/L (ref 132–146)
SPECIFIC GRAVITY UA: 1.01 (ref 1–1.03)
TOTAL CK: 17 U/L (ref 20–180)
TOTAL PROTEIN: 5.2 G/DL (ref 6.4–8.3)
TROPONIN, HIGH SENSITIVITY: 19 NG/L (ref 0–9)
TROPONIN, HIGH SENSITIVITY: 21 NG/L (ref 0–9)
TROPONIN, HIGH SENSITIVITY: <6 NG/L (ref 0–9)
UROBILINOGEN, URINE: 0.2 E.U./DL
WBC # BLD: 8.1 E9/L (ref 4.5–11.5)
WBC UA: ABNORMAL /HPF (ref 0–5)

## 2021-09-23 PROCEDURE — 83735 ASSAY OF MAGNESIUM: CPT

## 2021-09-23 PROCEDURE — 6360000002 HC RX W HCPCS: Performed by: EMERGENCY MEDICINE

## 2021-09-23 PROCEDURE — 6370000000 HC RX 637 (ALT 250 FOR IP): Performed by: EMERGENCY MEDICINE

## 2021-09-23 PROCEDURE — 93005 ELECTROCARDIOGRAM TRACING: CPT | Performed by: EMERGENCY MEDICINE

## 2021-09-23 PROCEDURE — 84484 ASSAY OF TROPONIN QUANT: CPT

## 2021-09-23 PROCEDURE — 80048 BASIC METABOLIC PNL TOTAL CA: CPT

## 2021-09-23 PROCEDURE — 82800 BLOOD PH: CPT

## 2021-09-23 PROCEDURE — 2580000003 HC RX 258

## 2021-09-23 PROCEDURE — 93880 EXTRACRANIAL BILAT STUDY: CPT

## 2021-09-23 PROCEDURE — 70450 CT HEAD/BRAIN W/O DYE: CPT

## 2021-09-23 PROCEDURE — G0378 HOSPITAL OBSERVATION PER HR: HCPCS

## 2021-09-23 PROCEDURE — 82962 GLUCOSE BLOOD TEST: CPT

## 2021-09-23 PROCEDURE — 2580000003 HC RX 258: Performed by: EMERGENCY MEDICINE

## 2021-09-23 PROCEDURE — 96375 TX/PRO/DX INJ NEW DRUG ADDON: CPT

## 2021-09-23 PROCEDURE — 83605 ASSAY OF LACTIC ACID: CPT

## 2021-09-23 PROCEDURE — 6370000000 HC RX 637 (ALT 250 FOR IP): Performed by: INTERNAL MEDICINE

## 2021-09-23 PROCEDURE — 85025 COMPLETE CBC W/AUTO DIFF WBC: CPT

## 2021-09-23 PROCEDURE — 36415 COLL VENOUS BLD VENIPUNCTURE: CPT

## 2021-09-23 PROCEDURE — 84100 ASSAY OF PHOSPHORUS: CPT

## 2021-09-23 PROCEDURE — 82010 KETONE BODYS QUAN: CPT

## 2021-09-23 PROCEDURE — 71045 X-RAY EXAM CHEST 1 VIEW: CPT

## 2021-09-23 PROCEDURE — 81001 URINALYSIS AUTO W/SCOPE: CPT

## 2021-09-23 PROCEDURE — 96374 THER/PROPH/DIAG INJ IV PUSH: CPT

## 2021-09-23 PROCEDURE — 82550 ASSAY OF CK (CPK): CPT

## 2021-09-23 PROCEDURE — 99285 EMERGENCY DEPT VISIT HI MDM: CPT

## 2021-09-23 PROCEDURE — 51701 INSERT BLADDER CATHETER: CPT

## 2021-09-23 PROCEDURE — 96361 HYDRATE IV INFUSION ADD-ON: CPT

## 2021-09-23 PROCEDURE — 1200000000 HC SEMI PRIVATE

## 2021-09-23 PROCEDURE — 80053 COMPREHEN METABOLIC PANEL: CPT

## 2021-09-23 RX ORDER — 0.9 % SODIUM CHLORIDE 0.9 %
1000 INTRAVENOUS SOLUTION INTRAVENOUS ONCE
Status: DISCONTINUED | OUTPATIENT
Start: 2021-09-23 | End: 2021-09-25 | Stop reason: HOSPADM

## 2021-09-23 RX ORDER — LOSARTAN POTASSIUM 100 MG/1
100 TABLET ORAL DAILY
Status: DISCONTINUED | OUTPATIENT
Start: 2021-09-23 | End: 2021-09-25 | Stop reason: HOSPADM

## 2021-09-23 RX ORDER — POTASSIUM CHLORIDE 20 MEQ/1
40 TABLET, EXTENDED RELEASE ORAL ONCE
Status: COMPLETED | OUTPATIENT
Start: 2021-09-23 | End: 2021-09-23

## 2021-09-23 RX ORDER — AMLODIPINE BESYLATE 5 MG/1
5 TABLET ORAL EVERY EVENING
COMMUNITY
End: 2021-10-11

## 2021-09-23 RX ORDER — CLOPIDOGREL BISULFATE 75 MG/1
75 TABLET ORAL DAILY
Status: DISCONTINUED | OUTPATIENT
Start: 2021-09-23 | End: 2021-09-24

## 2021-09-23 RX ORDER — ACETAMINOPHEN/DIPHENHYDRAMINE 500MG-25MG
1 TABLET ORAL DAILY PRN
COMMUNITY

## 2021-09-23 RX ORDER — CLOTRIMAZOLE 10 MG/1
10 LOZENGE ORAL; TOPICAL
Status: DISCONTINUED | OUTPATIENT
Start: 2021-09-23 | End: 2021-09-25 | Stop reason: HOSPADM

## 2021-09-23 RX ORDER — FERROUS SULFATE 325(65) MG
325 TABLET ORAL
Status: DISCONTINUED | OUTPATIENT
Start: 2021-09-24 | End: 2021-09-25 | Stop reason: HOSPADM

## 2021-09-23 RX ORDER — AMLODIPINE BESYLATE 5 MG/1
5 TABLET ORAL NIGHTLY
Status: DISCONTINUED | OUTPATIENT
Start: 2021-09-23 | End: 2021-09-25 | Stop reason: HOSPADM

## 2021-09-23 RX ORDER — ASPIRIN 81 MG/1
81 TABLET, CHEWABLE ORAL DAILY
Status: DISCONTINUED | OUTPATIENT
Start: 2021-09-23 | End: 2021-09-25 | Stop reason: HOSPADM

## 2021-09-23 RX ORDER — GABAPENTIN 100 MG/1
100 CAPSULE ORAL
COMMUNITY
End: 2021-11-18

## 2021-09-23 RX ORDER — SODIUM CHLORIDE 9 MG/ML
INJECTION, SOLUTION INTRAVENOUS CONTINUOUS
Status: DISCONTINUED | OUTPATIENT
Start: 2021-09-23 | End: 2021-09-24

## 2021-09-23 RX ORDER — FENTANYL CITRATE 50 UG/ML
25 INJECTION, SOLUTION INTRAMUSCULAR; INTRAVENOUS ONCE
Status: COMPLETED | OUTPATIENT
Start: 2021-09-23 | End: 2021-09-23

## 2021-09-23 RX ORDER — GABAPENTIN 100 MG/1
100 CAPSULE ORAL DAILY PRN
COMMUNITY
End: 2022-01-27 | Stop reason: SDUPTHER

## 2021-09-23 RX ORDER — PROMETHAZINE HYDROCHLORIDE 25 MG/1
25 TABLET ORAL EVERY 6 HOURS PRN
Status: DISCONTINUED | OUTPATIENT
Start: 2021-09-23 | End: 2021-09-25 | Stop reason: HOSPADM

## 2021-09-23 RX ORDER — SODIUM CHLORIDE 9 MG/ML
INJECTION, SOLUTION INTRAVENOUS
Status: COMPLETED
Start: 2021-09-23 | End: 2021-09-23

## 2021-09-23 RX ORDER — 0.9 % SODIUM CHLORIDE 0.9 %
500 INTRAVENOUS SOLUTION INTRAVENOUS ONCE
Status: COMPLETED | OUTPATIENT
Start: 2021-09-23 | End: 2021-09-23

## 2021-09-23 RX ORDER — ONDANSETRON 2 MG/ML
4 INJECTION INTRAMUSCULAR; INTRAVENOUS ONCE
Status: COMPLETED | OUTPATIENT
Start: 2021-09-23 | End: 2021-09-23

## 2021-09-23 RX ORDER — KETOROLAC TROMETHAMINE 30 MG/ML
30 INJECTION, SOLUTION INTRAMUSCULAR; INTRAVENOUS ONCE
Status: COMPLETED | OUTPATIENT
Start: 2021-09-23 | End: 2021-09-23

## 2021-09-23 RX ADMIN — FENTANYL CITRATE 25 MCG: 0.05 INJECTION, SOLUTION INTRAMUSCULAR; INTRAVENOUS at 08:24

## 2021-09-23 RX ADMIN — KETOROLAC TROMETHAMINE 30 MG: 30 INJECTION, SOLUTION INTRAMUSCULAR; INTRAVENOUS at 13:04

## 2021-09-23 RX ADMIN — SODIUM CHLORIDE: 9 INJECTION, SOLUTION INTRAVENOUS at 08:24

## 2021-09-23 RX ADMIN — INSULIN LISPRO 1 UNITS: 100 INJECTION, SOLUTION INTRAVENOUS; SUBCUTANEOUS at 22:27

## 2021-09-23 RX ADMIN — METOPROLOL TARTRATE 25 MG: 25 TABLET, FILM COATED ORAL at 22:20

## 2021-09-23 RX ADMIN — METOPROLOL TARTRATE 25 MG: 25 TABLET, FILM COATED ORAL at 10:22

## 2021-09-23 RX ADMIN — ONDANSETRON 4 MG: 2 INJECTION INTRAMUSCULAR; INTRAVENOUS at 08:24

## 2021-09-23 RX ADMIN — AMLODIPINE BESYLATE 5 MG: 5 TABLET ORAL at 22:20

## 2021-09-23 RX ADMIN — CLOPIDOGREL BISULFATE 75 MG: 75 TABLET ORAL at 22:20

## 2021-09-23 RX ADMIN — CLOTRIMAZOLE 10 MG: 10 LOZENGE ORAL; TOPICAL at 22:23

## 2021-09-23 RX ADMIN — SODIUM CHLORIDE: 9 INJECTION, SOLUTION INTRAVENOUS at 10:22

## 2021-09-23 RX ADMIN — SODIUM CHLORIDE 500 ML: 9 INJECTION, SOLUTION INTRAVENOUS at 14:00

## 2021-09-23 RX ADMIN — Medication 500 ML: at 14:00

## 2021-09-23 RX ADMIN — LOSARTAN POTASSIUM 100 MG: 100 TABLET, FILM COATED ORAL at 22:20

## 2021-09-23 RX ADMIN — POTASSIUM CHLORIDE 40 MEQ: 20 TABLET, EXTENDED RELEASE ORAL at 10:22

## 2021-09-23 ASSESSMENT — PAIN DESCRIPTION - DESCRIPTORS: DESCRIPTORS: HEADACHE

## 2021-09-23 ASSESSMENT — PAIN SCALES - GENERAL
PAINLEVEL_OUTOF10: 5
PAINLEVEL_OUTOF10: 3

## 2021-09-23 ASSESSMENT — PAIN DESCRIPTION - ORIENTATION: ORIENTATION: POSTERIOR

## 2021-09-23 ASSESSMENT — PAIN DESCRIPTION - LOCATION: LOCATION: HEAD

## 2021-09-23 ASSESSMENT — PAIN SCALES - WONG BAKER: WONGBAKER_NUMERICALRESPONSE: 2

## 2021-09-23 ASSESSMENT — PAIN DESCRIPTION - FREQUENCY: FREQUENCY: CONTINUOUS

## 2021-09-23 ASSESSMENT — PAIN DESCRIPTION - PROGRESSION: CLINICAL_PROGRESSION: NOT CHANGED

## 2021-09-23 NOTE — H&P
Department of Internal Medicine  History and Physical    Primary Care Physician: Shantel Keyes MD   Admitting Physician:  Jose Manuel Lombardo DO  Admission date and time: 9/23/2021  7:38 AM    Room:  09/09  Admitting diagnosis: Headache [R51.9]    Patient Name: Andreea Hamlin  MRN: [id number]    Date of Service: 9/23/2021     CHIEF COMPLAINT:  headache    HISTORY OF PRESENT ILLNESS:    This is a 80year old female who presented to the hospital with a complaint of headache. States that when she went to bed her neck and left ear area started to hurt so she put ice on it. The pain woke her up off and on all night. States it was an annoying ache. Denies injury or trauma. She she woke up this morning she had a 10/10 headache. Admitted to phonophobia. No photophobia. She resides with her daughter. This morning her daughter noticed the patient's speech was slurred and speech was difficult to understand. As per daughter headaches are chronic. No weakness. After evaluationbthe the ED it was determined the patient would be admitted for further evaluation and treatmentbunder the service of Dr. Keri Arteaga and Dr. Cheng Kumar.       PAST MEDICAL Hx:  Past Medical History:   Diagnosis Date    Arthritis     Bowel obstruction ([address]     Diabetes mellitus ([address]     Diverticulosis     Hyperlipidemia     Hypertension     Irritable bowel syndrome     Peptic ulcer disease     Spinal stenosis     Syncope and collapse 2013    admitted to [facility] due to [facility] TIA    Type 2 diabetes mellitus without complication ([address]        PAST SURGICAL Hx:   Past Surgical History:   Procedure Laterality Date    APPENDECTOMY      BREAST SURGERY Right     Lumpectomy    CHOLECYSTECTOMY      COLON SURGERY      COLONOSCOPY  08/2012    ECHO COMPL W DOP COLOR FLOW  04/22/2013         HYSTERECTOMY      JOINT REPLACEMENT      LTKA    KNEE ARTHROSCOPY      left knee    KNEE SURGERY      Right partial knee    PRE-MALIGNANT / BENIGN SKIN LESION Transportation (Medical):  Lack of Transportation (Non-Medical):    Physical Activity:     Days of Exercise per Week:     Minutes of Exercise per Session:    Stress:     Feeling of Stress :    Social Connections:     Frequency of Communication with Friends and Family:     Frequency of Social Gatherings with Friends and Family:     Attends Anabaptism Services:     Active Member of Clubs or Organizations:     Attends Club or Organization Meetings:     Marital Status:    Intimate Partner Violence:     Fear of Current or Ex-Partner:     Emotionally Abused:     Physically Abused:     Sexually Abused:        ROS:  General:   Denies chills, fatigue, fever, malaise, night sweats or weight loss    Psychological:   Denies anxiety, disorientation or hallucinations    ENT:    Denies epistaxis, chronic headaches, no vertigo or visual changes. Cardiovascular:   Denies any chest pain, irregular heartbeats, or palpitations. No paroxysmal nocturnal dyspnea. Respiratory:   Denies shortness of breath, coughing, sputum production, hemoptysis, or wheezing. No orthopnea. Gastrointestinal:   Denies nausea, vomiting, diarrhea, or constipation. Denies any abdominal pain. Denies change in bowel habits or stools. Genito-Urinary:    Denies any urgency, frequency, hematuria. Voiding without difficulty. Musculoskeletal:   Denies joint pain, joint stiffness, joint swelling or muscle pain    Neurology:    Positive for headache. Lightheaded on occassion. No weakness or paresthesia. Derm:    Denies any rashes, ulcers, or excoriations. Denies bruising. Extremities:   Denies any lower extremity swelling or edema.       PHYSICAL EXAM:  VITALS:  Vitals:    09/23/21 1522   BP:    Pulse: 73   Resp: 20   Temp:    SpO2: 97%         CONSTITUTIONAL:    Awake, alert, cooperative, no apparent distress, and appears stated age    EYES:    PERRL, EOMI, sclera clear, conjunctiva normal    ENT:    Normocephalic, atraumatic, sinuses nontender on palpation. External ears without lesions. Oral pharynx with moist mucus membranes. NECK:    Supple, symmetrical, trachea midline, no adenopathy, thyroid symmetric, not enlarged and no tenderness, skin normal, no bruits, no JVD. no pain with palpation. HEMATOLOGIC/LYMPHATICS:    No cervical lymphadenopathy and no supraclavicular lymphadenopathy    LUNGS:    Symmetric. No increased work of breathing, good air exchange, clear to auscultation bilaterally, no wheezes, rhonchi, or rales,     CARDIOVASCULAR:    Normal apical impulse, regular rate and rhythm, normal S1 and S2, no S3 or S4, and no murmur noted    ABDOMEN:    No scars, normal bowel sounds, soft, non-distended, non-tender, no masses palpated, no hepatosplenomegaly, no rebound or guarding elicited on palpation     MUSCULOSKELETAL:    There is no redness, warmth, or swelling of the joints. Full range of motion noted. Motor strength is 5 out of 5 all extremities bilaterally. Tone is normal.    NEUROLOGIC:    Awake, alert, oriented to name, place and time. Cranial nerves II-XII are grossly intact. Was extremities. Speech is slurred. Tongue does deviate slightly to the left. Mild left facial droop. Strength otherwise intact. SKIN:    No bruising or bleeding. No redness, warmth, or swelling    EXTREMITIES:    Peripheral pulses present. No edema, cyanosis, or swelling. OSTEOPATHIC:    Examined in seated and supine positions. Normal thoracic kyphosis and lumbar lordosis. No acute somatic dysfunction.     LABORATORY DATA:  CBC:   Lab Results   Component Value Date    WBC 8.1 09/23/2021    RBC 4.50 09/23/2021    HGB 12.9 09/23/2021    HCT 40.8 09/23/2021    MCV 90.7 09/23/2021    MCH 28.7 09/23/2021    MCHC 31.6 09/23/2021    RDW 14.2 09/23/2021     09/23/2021    MPV 12.0 09/23/2021     CBC with Differential:    Lab Results   Component Value Date    WBC 8.1 09/23/2021    RBC 4.50 09/23/2021    HGB 12.9 09/23/2021    HCT 40.8 09/23/2021     09/23/2021    MCV 90.7 09/23/2021    MCH 28.7 09/23/2021    MCHC 31.6 09/23/2021    RDW 14.2 09/23/2021    SEGSPCT 64 04/26/2013    LYMPHOPCT 23.0 09/23/2021    MONOPCT 7.6 09/23/2021    BASOPCT 0.5 09/23/2021    MONOSABS 0.61 09/23/2021    LYMPHSABS 1.85 09/23/2021    EOSABS 0.15 09/23/2021    BASOSABS 0.04 09/23/2021     CMP:    Lab Results   Component Value Date     09/23/2021    K 4.4 09/23/2021    K 4.9 08/29/2021     09/23/2021    CO2 22 09/23/2021    BUN 16 09/23/2021    CREATININE 0.8 09/23/2021    GFRAA >60 09/23/2021    LABGLOM >60 09/23/2021    GLUCOSE 179 09/23/2021    GLUCOSE 131 04/22/2011    PROT 5.2 09/23/2021    LABALBU 3.1 09/23/2021    CALCIUM 8.7 09/23/2021    BILITOT 0.3 09/23/2021    ALKPHOS 52 09/23/2021    AST 13 09/23/2021    ALT 7 09/23/2021       ASSESSMENT:    · Acute non-intractable headache undetermined etiology  · Slurred speech possibly secondary medication rule out strokelike symptoms  · Essential hypertension  · Elevated troponin possibly secondary demand ischemia rule out non-ST elevated myocardial infarction  · Noninsulin-dependent diabetes mellitus poorly controlled  · Obesity with elevated BMI of 35.75  · Hard of hearing      PLAN:    · Patient will be admitted to telemetry  · Cycle cardiac enzymes and serial EKGs  · Monitor blood sugar with adjustment made to medication  · Adjust blood pressure medication accordingly  · Echocardiogram  · Doppler studies of the carotids  · Possible MRI      ELISE Ruiz CNP, NPBeatrizC  5:22 PM  9/23/2021    Electronically signed by ELISE Ruiz CNP on 9/23/21 at 5:10 PM EDT      I have personally participated in the history and  medical decision making with the nurse practitioner on the date of service and I agree with all the pertinent clinical information unless otherwise noted.   I have also reviewed and agree with the past medical, family, and social history unless otherwise noted.      Otoniel Sánchez DO, D.O., [facility]  6:40 PM  9/23/2021

## 2021-09-23 NOTE — ED PROVIDER NOTES
HPI:  9/23/21, Time: 8:08 AM EDT      HPI Per patient and daughter. Toni Mckeon is a 80 y.o. female presenting to the ED for headache with swollen and tender lymph node behind left ear/left neck, beginning 2 days ago. Today daughter noticed patient was slurring her speech. It is associated with some nausea but no vomiting. They deny focal paresthesias, focal weakness. she has some visual disturbances but that is associated with longstanding macular degeneration. She always has some dysuria but that is also longstanding and it is associated with some dribbling. She always has some dry mucous membranes and they attributed to being on diabetic medications. She has no fever or chills, cough or congestion, vomiting or diarrhea, chest pain, shortness of breath, cough or congestion, abdominal pain or diarrhea. The complaint has been persistent, moderate in severity, and worsened by nothing. Patient denies fever/chills, sore throat, cough, congestion, chest pain, shortness of breath, edema, focal paresthesias, focal weakness, abdominal pain, vomiting, diarrhea, constipation, hematuria, trauma, neck or back pain, rash or other complaints. Lives at home alone. ROS:   A complete review of systems was performed and all pertinent positives and negatives are stated within HPI, all other systems reviewed and are negative.      --------------------------------------------- PAST HISTORY ---------------------------------------------  Past Medical History:  has a past medical history of Arthritis, Bowel obstruction ([facility] Diabetes mellitus ([facility] Diverticulosis, Hyperlipidemia, Hypertension, Irritable bowel syndrome, Peptic ulcer disease, Spinal stenosis, Syncope and collapse, and Type 2 diabetes mellitus without complication ([facility] Past Surgical History:  has a past surgical history that includes Hysterectomy; Cholecystectomy; Tonsillectomy; Colon surgery;  Appendectomy; Breast surgery; joint replacement; ECHO questions correctly   1C: Tell Patient To Open and Close Eyes, then Hand  Squeeze 0 - performs both tasks correctly   2: Test Horizontal Extraocular Movements 0 - normal   3: Test Visual Fields 0 - no visual loss   4: Test Facial Palsy 0 - normal symmetric movement   5A: Test Left Arm Motor Drift 0 - no drift, limb holds 90 (or 45) degrees for full 10 seconds   5B: Test Right Arm Motor Drift 0 - no drift, limb holds 90 (or 45) degrees for full 10 seconds   6A: Test Left Leg Motor Drift 0 - no drift; leg holds 30 degree position for full 5 seconds   6B: Test Right Leg Motor Drift 0 - no drift; leg holds 30 degree position for full 5 seconds   7: Test Limb Ataxia   (FNF/Heel-Shin) 0 - absent   8: Test Sensation 0 - normal; no sensory loss   9: Test Language/Aphasia 0 - no aphasia, normal   10: Test Dysarthria 0 - normal   11: Test Extinction/Inattention 0 - no abnormality   Total 0     Not a tpa candidate      -------------------------------------------------- RESULTS -------------------------------------------------  I have personally reviewed all laboratory and imaging results for this patient. Results are listed below.      LABS:  Results for orders placed or performed during the hospital encounter of 09/23/21   Urinalysis with Microscopic   Result Value Ref Range    Color, UA Yellow Straw/Yellow    Clarity, UA Clear Clear    Glucose, Ur 250 (A) Negative mg/dL    Bilirubin Urine Negative Negative    Ketones, Urine Negative Negative mg/dL    Specific Gravity, UA 1.015 1.005 - 1.030    Blood, Urine TRACE (A) Negative    pH, UA 6.5 5.0 - 9.0    Protein,  (A) Negative mg/dL    Urobilinogen, Urine 0.2 <2.0 E.U./dL    Nitrite, Urine Negative Negative    Leukocyte Esterase, Urine Negative Negative    WBC, UA 2-5 0 - 5 /HPF    RBC, UA 1-3 0 - 2 /HPF    Epithelial Cells, UA RARE /HPF    Bacteria, UA RARE (A) None Seen /HPF   CBC auto differential   Result Value Ref Range    WBC 8.1 4.5 - 11.5 E9/L    RBC 4.50 3.50 - 5.50 E12/L    Hemoglobin 12.9 11.5 - 15.5 g/dL    Hematocrit 40.8 34.0 - 48.0 %    MCV 90.7 80.0 - 99.9 fL    MCH 28.7 26.0 - 35.0 pg    MCHC 31.6 (L) 32.0 - 34.5 %    RDW 14.2 11.5 - 15.0 fL    Platelets 027 081 - 508 E9/L    MPV 12.0 7.0 - 12.0 fL    Neutrophils % 64.9 43.0 - 80.0 %    Immature Granulocytes % 2.1 0.0 - 5.0 %    Lymphocytes % 23.0 20.0 - 42.0 %    Monocytes % 7.6 2.0 - 12.0 %    Eosinophils % 1.9 0.0 - 6.0 %    Basophils % 0.5 0.0 - 2.0 %    Neutrophils Absolute 5.23 1.80 - 7.30 E9/L    Immature Granulocytes # 0.17 E9/L    Lymphocytes Absolute 1.85 1.50 - 4.00 E9/L    Monocytes Absolute 0.61 0.10 - 0.95 E9/L    Eosinophils Absolute 0.15 0.05 - 0.50 E9/L    Basophils Absolute 0.04 0.00 - 0.20 E9/L   Comprehensive Metabolic Panel   Result Value Ref Range    Sodium 144 132 - 146 mmol/L    Potassium 3.2 (L) 3.5 - 5.0 mmol/L    Chloride 118 (H) 98 - 107 mmol/L    CO2 17 (L) 22 - 29 mmol/L    Anion Gap 9 7 - 16 mmol/L    Glucose 224 (H) 74 - 99 mg/dL    BUN 19 6 - 23 mg/dL    CREATININE 0.7 0.5 - 1.0 mg/dL    GFR Non-African American >60 >=60 mL/min/1.73    GFR African American >60     Calcium 7.1 (L) 8.6 - 10.2 mg/dL    Total Protein 5.2 (L) 6.4 - 8.3 g/dL    Albumin 3.1 (L) 3.5 - 5.2 g/dL    Total Bilirubin 0.3 0.0 - 1.2 mg/dL    Alkaline Phosphatase 52 35 - 104 U/L    ALT 7 0 - 32 U/L    AST 13 0 - 31 U/L   Troponin   Result Value Ref Range    Troponin, High Sensitivity 19 (H) 0 - 9 ng/L   CK   Result Value Ref Range    Total CK 17 (L) 20 - 180 U/L   pH, venous   Result Value Ref Range    pH, Nick 7.33 (L) 7.35 - 7.45   Beta-Hydroxybutyrate   Result Value Ref Range    Beta-Hydroxybutyrate 0.14 0.02 - 0.27 mmol/L   Lactic Acid, Plasma   Result Value Ref Range    Lactic Acid 2.1 0.5 - 2.2 mmol/L   Troponin   Result Value Ref Range    Troponin, High Sensitivity <6 0 - 9 ng/L   Troponin   Result Value Ref Range    Troponin, High Sensitivity 21 (H) 0 - 9 ng/L   Basic Metabolic Panel   Result Value Ref Range    Sodium 145 132 - 146 mmol/L    Potassium 4.4 3.5 - 5.0 mmol/L    Chloride 115 (H) 98 - 107 mmol/L    CO2 22 22 - 29 mmol/L    Anion Gap 8 7 - 16 mmol/L    Glucose 179 (H) 74 - 99 mg/dL    BUN 16 6 - 23 mg/dL    CREATININE 0.8 0.5 - 1.0 mg/dL    GFR Non-African American >60 >=60 mL/min/1.73    GFR African American >60     Calcium 8.7 8.6 - 10.2 mg/dL   EKG 12 Lead   Result Value Ref Range    Ventricular Rate 83 BPM    Atrial Rate 83 BPM    P-R Interval 136 ms    QRS Duration 90 ms    Q-T Interval 412 ms    QTc Calculation (Bazett) 484 ms    P Axis 43 degrees    R Axis -25 degrees    T Axis 90 degrees       RADIOLOGY:  Interpreted by Radiologist.  CT HEAD WO CONTRAST   Final Result   No acute osseous abnormality. XR CHEST PORTABLE   Final Result   1. There are no findings of pneumonia or failure   2. Mild interstitial fibrotic changes. EKG Interpretation  Time: 7:48 AM EDT  Rhythm: normal sinus   Rate: 83  Axis: normal  Conduction: normal  ST Segments: no acute change  T Waves: no acute change  Clinical Impression: left ventricular hypertrophy  Comparison to prior EKG: stable as compared to patient's most recent EKG      ------------------------- NURSING NOTES AND VITALS REVIEWED ---------------------------  The nursing notes within the ED encounter and vital signs as below have been reviewed by myself. BP (!) 176/61   Pulse 73   Temp 97.4 °F (36.3 °C) (Temporal)   Resp 19   SpO2 96%   Oxygen Saturation Interpretation: Normal      The patients available past medical records and past encounters were reviewed.         ------------------------------ ED COURSE/MEDICAL DECISION MAKING----------------------  Medications   0.9 % sodium chloride infusion ( IntraVENous New Bag 9/23/21 1022)   metoprolol tartrate (LOPRESSOR) tablet 25 mg (25 mg Oral Given 9/23/21 1022)   0.9 % sodium chloride bolus ( IntraVENous Canceled Entry 9/23/21 3612)   ondansetron (ZOFRAN) injection 4 mg (4 mg IntraVENous Given 9/23/21 0824)   fentaNYL (SUBLIMAZE) injection 25 mcg (25 mcg IntraVENous Given 9/23/21 0824)   potassium chloride (KLOR-CON M) extended release tablet 40 mEq (40 mEq Oral Given 9/23/21 1022)   ketorolac (TORADOL) injection 30 mg (30 mg IntraVENous Given 9/23/21 1304)   0.9 % sodium chloride bolus (0 mLs IntraVENous Stopped 9/23/21 1450)           Procedures:   none      Medical Decision Making:    3:10 PM EDT  Headache improved with toradol and fluids. BP improved. Daughter states patient still slurring speech and is nervous patient is unsafe at home alone. Would like patient admitted. This patient's ED course included: re-evaluation prior to disposition, IV medications, cardiac monitoring, continuous pulse oximetry and a personal history and physicial eaxmination    This patient has remained hemodynamically stable, improved and been closely monitored during their ED course. Re-Evaluations:  Time: 3:13 PM EDT   Re-evaluation. Patients symptoms are improving  Repeat physical examination is not changed      Consultations:   3:13 PM EDT  Spoke with Dr Joe Obrien, discussed case, accepts admission    Critical Care: none    Mirta STEINER, DO, am the Primary Provider of Record    Counseling: The emergency provider has spoken with the patient and daughter and discussed todays results, in addition to providing specific details for the plan of care and counseling regarding the diagnosis and prognosis. Questions are answered at this time and they are agreeable with the plan.    --------------------------- IMPRESSION AND DISPOSITION ---------------------------------    IMPRESSION  1. Acute nonintractable headache, unspecified headache type    2. Essential hypertension    3. Hyperglycemia    4. Hypokalemia    5. Dysuria    6.  Slurred speech    7. Elevated troponin        DISPOSITION  Disposition: Admit to telemetry  Patient condition is stable             Rita Mccord DO  09/23/21 [id number]

## 2021-09-23 NOTE — CARE COORDINATION
SS NOTE: NO COVID TESTING DONE. SW met with pt and her dtr/ caregiver, Cecy today. Pt was having some difficulty speaking and requested that this SW interview her dtr. Cecy relates that pt resides with her in a 1 floor plan home. Dtr relates that she supervised pt's activities at home, but pt performs independently. Cecy does arrange pt's meds. Pt has dme at home, ww, shower seat and a bsc at home. She has no hx of hhc or snf/rehab. Pt's PCP is Dr Sylvester Lee and her pharmacy is [facility]. PT/OT and Speech will be helpful in determining pt's dch needs. SS to continue. TIFFANIE Quinones.9/23/2021.3:48 PM.

## 2021-09-24 PROBLEM — I65.29 CAROTID STENOSIS, ASYMPTOMATIC: Status: ACTIVE | Noted: 2021-09-24

## 2021-09-24 LAB
ALBUMIN SERPL-MCNC: 3.6 G/DL (ref 3.5–5.2)
ALP BLD-CCNC: 67 U/L (ref 35–104)
ALT SERPL-CCNC: 8 U/L (ref 0–32)
ANION GAP SERPL CALCULATED.3IONS-SCNC: 9 MMOL/L (ref 7–16)
AST SERPL-CCNC: 16 U/L (ref 0–31)
BASOPHILS ABSOLUTE: 0.04 E9/L (ref 0–0.2)
BASOPHILS RELATIVE PERCENT: 0.4 % (ref 0–2)
BILIRUB SERPL-MCNC: 0.4 MG/DL (ref 0–1.2)
BUN BLDV-MCNC: 17 MG/DL (ref 6–23)
CALCIUM SERPL-MCNC: 8.5 MG/DL (ref 8.6–10.2)
CHLORIDE BLD-SCNC: 109 MMOL/L (ref 98–107)
CHOLESTEROL, TOTAL: 127 MG/DL (ref 0–199)
CK MB: 1.8 NG/ML (ref 0–4.3)
CO2: 23 MMOL/L (ref 22–29)
CREAT SERPL-MCNC: 0.9 MG/DL (ref 0.5–1)
EOSINOPHILS ABSOLUTE: 0.08 E9/L (ref 0.05–0.5)
EOSINOPHILS RELATIVE PERCENT: 0.9 % (ref 0–6)
FOLATE: 14.4 NG/ML (ref 4.8–24.2)
GFR AFRICAN AMERICAN: >60
GFR NON-AFRICAN AMERICAN: 58 ML/MIN/1.73
GLUCOSE BLD-MCNC: 235 MG/DL (ref 74–99)
HBA1C MFR BLD: 7.5 % (ref 4–5.6)
HCT VFR BLD CALC: 38.5 % (ref 34–48)
HDLC SERPL-MCNC: 23 MG/DL
HEMOGLOBIN: 12.5 G/DL (ref 11.5–15.5)
IMMATURE GRANULOCYTES #: 0.14 E9/L
IMMATURE GRANULOCYTES %: 1.6 % (ref 0–5)
LDL CHOLESTEROL CALCULATED: 44 MG/DL (ref 0–99)
LV EF: 72 %
LVEF MODALITY: NORMAL
LYMPHOCYTES ABSOLUTE: 1.77 E9/L (ref 1.5–4)
LYMPHOCYTES RELATIVE PERCENT: 19.8 % (ref 20–42)
MCH RBC QN AUTO: 28.8 PG (ref 26–35)
MCHC RBC AUTO-ENTMCNC: 32.5 % (ref 32–34.5)
MCV RBC AUTO: 88.7 FL (ref 80–99.9)
METER GLUCOSE: 158 MG/DL (ref 74–99)
METER GLUCOSE: 174 MG/DL (ref 74–99)
METER GLUCOSE: 216 MG/DL (ref 74–99)
METER GLUCOSE: 240 MG/DL (ref 74–99)
MONOCYTES ABSOLUTE: 0.74 E9/L (ref 0.1–0.95)
MONOCYTES RELATIVE PERCENT: 8.3 % (ref 2–12)
NEUTROPHILS ABSOLUTE: 6.19 E9/L (ref 1.8–7.3)
NEUTROPHILS RELATIVE PERCENT: 69 % (ref 43–80)
PDW BLD-RTO: 14.4 FL (ref 11.5–15)
PLATELET # BLD: 231 E9/L (ref 130–450)
PMV BLD AUTO: 12 FL (ref 7–12)
POTASSIUM SERPL-SCNC: 4.3 MMOL/L (ref 3.5–5)
RBC # BLD: 4.34 E12/L (ref 3.5–5.5)
SEDIMENTATION RATE, ERYTHROCYTE: 15 MM/HR (ref 0–20)
SODIUM BLD-SCNC: 141 MMOL/L (ref 132–146)
TOTAL CK: 21 U/L (ref 20–180)
TOTAL PROTEIN: 6.2 G/DL (ref 6.4–8.3)
TRIGL SERPL-MCNC: 298 MG/DL (ref 0–149)
TROPONIN, HIGH SENSITIVITY: 24 NG/L (ref 0–9)
TSH SERPL DL<=0.05 MIU/L-ACNC: 4.9 UIU/ML (ref 0.27–4.2)
VITAMIN B-12: 441 PG/ML (ref 211–946)
VLDLC SERPL CALC-MCNC: 60 MG/DL
WBC # BLD: 9 E9/L (ref 4.5–11.5)

## 2021-09-24 PROCEDURE — 96365 THER/PROPH/DIAG IV INF INIT: CPT

## 2021-09-24 PROCEDURE — 80061 LIPID PANEL: CPT

## 2021-09-24 PROCEDURE — 96366 THER/PROPH/DIAG IV INF ADDON: CPT

## 2021-09-24 PROCEDURE — 80053 COMPREHEN METABOLIC PANEL: CPT

## 2021-09-24 PROCEDURE — 36415 COLL VENOUS BLD VENIPUNCTURE: CPT

## 2021-09-24 PROCEDURE — 85025 COMPLETE CBC W/AUTO DIFF WBC: CPT

## 2021-09-24 PROCEDURE — 82553 CREATINE MB FRACTION: CPT

## 2021-09-24 PROCEDURE — G0378 HOSPITAL OBSERVATION PER HR: HCPCS

## 2021-09-24 PROCEDURE — 84484 ASSAY OF TROPONIN QUANT: CPT

## 2021-09-24 PROCEDURE — 82962 GLUCOSE BLOOD TEST: CPT

## 2021-09-24 PROCEDURE — 82607 VITAMIN B-12: CPT

## 2021-09-24 PROCEDURE — 97116 GAIT TRAINING THERAPY: CPT | Performed by: PHYSICAL THERAPIST

## 2021-09-24 PROCEDURE — 6370000000 HC RX 637 (ALT 250 FOR IP): Performed by: INTERNAL MEDICINE

## 2021-09-24 PROCEDURE — 6360000002 HC RX W HCPCS: Performed by: INTERNAL MEDICINE

## 2021-09-24 PROCEDURE — 97165 OT EVAL LOW COMPLEX 30 MIN: CPT

## 2021-09-24 PROCEDURE — 6370000000 HC RX 637 (ALT 250 FOR IP): Performed by: EMERGENCY MEDICINE

## 2021-09-24 PROCEDURE — 82746 ASSAY OF FOLIC ACID SERUM: CPT

## 2021-09-24 PROCEDURE — 1200000000 HC SEMI PRIVATE

## 2021-09-24 PROCEDURE — 93306 TTE W/DOPPLER COMPLETE: CPT

## 2021-09-24 PROCEDURE — 82550 ASSAY OF CK (CPK): CPT

## 2021-09-24 PROCEDURE — 83036 HEMOGLOBIN GLYCOSYLATED A1C: CPT

## 2021-09-24 PROCEDURE — 84443 ASSAY THYROID STIM HORMONE: CPT

## 2021-09-24 PROCEDURE — 85651 RBC SED RATE NONAUTOMATED: CPT

## 2021-09-24 PROCEDURE — 97535 SELF CARE MNGMENT TRAINING: CPT

## 2021-09-24 PROCEDURE — 97161 PT EVAL LOW COMPLEX 20 MIN: CPT | Performed by: PHYSICAL THERAPIST

## 2021-09-24 RX ORDER — MAGNESIUM SULFATE 1 G/100ML
1000 INJECTION INTRAVENOUS ONCE
Status: COMPLETED | OUTPATIENT
Start: 2021-09-24 | End: 2021-09-24

## 2021-09-24 RX ORDER — HYDRALAZINE HYDROCHLORIDE 20 MG/ML
10 INJECTION INTRAMUSCULAR; INTRAVENOUS EVERY 6 HOURS PRN
Status: DISCONTINUED | OUTPATIENT
Start: 2021-09-24 | End: 2021-09-25 | Stop reason: HOSPADM

## 2021-09-24 RX ORDER — ACETAMINOPHEN 325 MG/1
650 TABLET ORAL EVERY 4 HOURS PRN
Status: DISCONTINUED | OUTPATIENT
Start: 2021-09-24 | End: 2021-09-25 | Stop reason: HOSPADM

## 2021-09-24 RX ORDER — LEVOTHYROXINE SODIUM 0.03 MG/1
25 TABLET ORAL DAILY
Status: DISCONTINUED | OUTPATIENT
Start: 2021-09-24 | End: 2021-09-25 | Stop reason: HOSPADM

## 2021-09-24 RX ADMIN — PROMETHAZINE HYDROCHLORIDE 25 MG: 25 TABLET ORAL at 13:04

## 2021-09-24 RX ADMIN — FERROUS SULFATE TAB 325 MG (65 MG ELEMENTAL FE) 325 MG: 325 (65 FE) TAB at 08:59

## 2021-09-24 RX ADMIN — METOPROLOL TARTRATE 25 MG: 25 TABLET, FILM COATED ORAL at 08:58

## 2021-09-24 RX ADMIN — INSULIN LISPRO 2 UNITS: 100 INJECTION, SOLUTION INTRAVENOUS; SUBCUTANEOUS at 18:29

## 2021-09-24 RX ADMIN — METOPROLOL TARTRATE 25 MG: 25 TABLET, FILM COATED ORAL at 22:22

## 2021-09-24 RX ADMIN — PSYLLIUM HUSK 1 PACKET: 3.4 GRANULE ORAL at 11:49

## 2021-09-24 RX ADMIN — INSULIN LISPRO 1 UNITS: 100 INJECTION, SOLUTION INTRAVENOUS; SUBCUTANEOUS at 22:26

## 2021-09-24 RX ADMIN — CLOTRIMAZOLE 10 MG: 10 LOZENGE ORAL; TOPICAL at 16:21

## 2021-09-24 RX ADMIN — LEVOTHYROXINE SODIUM 25 MCG: 25 TABLET ORAL at 17:06

## 2021-09-24 RX ADMIN — AMLODIPINE BESYLATE 5 MG: 5 TABLET ORAL at 22:22

## 2021-09-24 RX ADMIN — ASPIRIN 81 MG CHEWABLE TABLET 81 MG: 81 TABLET CHEWABLE at 08:59

## 2021-09-24 RX ADMIN — INSULIN LISPRO 4 UNITS: 100 INJECTION, SOLUTION INTRAVENOUS; SUBCUTANEOUS at 09:04

## 2021-09-24 RX ADMIN — MAGNESIUM SULFATE HEPTAHYDRATE 1000 MG: 1 INJECTION, SOLUTION INTRAVENOUS at 17:06

## 2021-09-24 RX ADMIN — CLOPIDOGREL BISULFATE 75 MG: 75 TABLET ORAL at 08:59

## 2021-09-24 RX ADMIN — PSYLLIUM HUSK 1 PACKET: 3.4 GRANULE ORAL at 22:23

## 2021-09-24 RX ADMIN — INSULIN LISPRO 4 UNITS: 100 INJECTION, SOLUTION INTRAVENOUS; SUBCUTANEOUS at 11:50

## 2021-09-24 RX ADMIN — CLOTRIMAZOLE 10 MG: 10 LOZENGE ORAL; TOPICAL at 22:22

## 2021-09-24 RX ADMIN — CLOTRIMAZOLE 10 MG: 10 LOZENGE ORAL; TOPICAL at 06:09

## 2021-09-24 RX ADMIN — LOSARTAN POTASSIUM 100 MG: 100 TABLET, FILM COATED ORAL at 09:43

## 2021-09-24 RX ADMIN — CLOTRIMAZOLE 10 MG: 10 LOZENGE ORAL; TOPICAL at 18:57

## 2021-09-24 ASSESSMENT — PAIN SCALES - GENERAL: PAINLEVEL_OUTOF10: 0

## 2021-09-24 NOTE — PROGRESS NOTES
Internal Medicine Progress Note    [facility]    Jimmyduran Leonard. Patrice Tracy., F.A.C.O.I. Jacky Tobias D.O., OCTAVIO Ortega D.O. Prabhjot Kumari, MSN, APRN, NP-C  Aleksandr Hines. Jeniffer Muñoz, MSN, APRN-CNP     Primary Care Physician: Blane Zheng MD   Admitting Physician:  Ute Barnes DO  Admission date and time: 9/23/2021  7:38 AM    Room:  [address] [place] [address]  Admitting diagnosis: Dysuria [R30.0]  Hypokalemia [E87.6]  Slurred speech [R47.81]  Essential hypertension [I10]  Hyperglycemia [R73.9]  Elevated troponin [R77.8]  Headache [R51.9]  Acute nonintractable headache, unspecified headache type [R51.9]    Patient Name: Aly Brooks  MRN: [id number]    Date of Service: 9/24/2021     Subjective:  Estelle Ayala is a 80 y.o. female who was seen and examined today,9/24/2021, at the bedside. Patient still have slightly slurred speech. Again the patient has declined MRI. Will adjust blood pressure medication. Has been seen by neurology    Discussed condition with her daughter    Review of System:   Constitutional:   Denies fever or chills, weight loss or gain, fatigue or malaise. HEENT:   Denies ear pain, sore throat, sinus or eye problems. Cardiovascular:   Denies any chest pain, irregular heartbeats, or palpitations. Respiratory:   Denies shortness of breath, coughing, sputum production, hemoptysis, or wheezing. Gastrointestinal:   Denies nausea, vomiting, diarrhea, or constipation. Denies any abdominal pain. Genitourinary:    Denies any urgency, frequency, hematuria. Voiding  without difficulty. Extremities:   Denies lower extremity swelling, edema or cyanosis. Neurology:    Denies any headache or focal neurological deficits, Denies generalized weakness or memory difficulty. Slightly slurred speech  Psch:   Denies being anxious or depressed. Musculoskeletal:    Denies  myalgias, joint complaints or back pain.    Integumentary:   Denies any rashes, ulcers, or  ferrous sulfate  325 mg Oral Daily with breakfast    losartan  100 mg Oral Daily    aspirin  81 mg Oral Daily    clopidogrel  75 mg Oral Daily    insulin lispro  0-12 Units SubCUTAneous TID WC    insulin lispro  0-6 Units SubCUTAneous Nightly     Continuous Infusions:   sodium chloride 100 mL/hr at 09/23/21 1022       Objective Data:  CBC with Differential:    Lab Results   Component Value Date    WBC 9.0 09/24/2021    RBC 4.34 09/24/2021    HGB 12.5 09/24/2021    HCT 38.5 09/24/2021     09/24/2021    MCV 88.7 09/24/2021    MCH 28.8 09/24/2021    MCHC 32.5 09/24/2021    RDW 14.4 09/24/2021    SEGSPCT 64 04/26/2013    LYMPHOPCT 19.8 09/24/2021    MONOPCT 8.3 09/24/2021    BASOPCT 0.4 09/24/2021    MONOSABS 0.74 09/24/2021    LYMPHSABS 1.77 09/24/2021    EOSABS 0.08 09/24/2021    BASOSABS 0.04 09/24/2021     CMP:    Lab Results   Component Value Date     09/24/2021    K 4.3 09/24/2021    K 4.9 08/29/2021     09/24/2021    CO2 23 09/24/2021    BUN 17 09/24/2021    CREATININE 0.9 09/24/2021    GFRAA >60 09/24/2021    LABGLOM 58 09/24/2021    GLUCOSE 235 09/24/2021    GLUCOSE 131 04/22/2011    PROT 6.2 09/24/2021    LABALBU 3.6 09/24/2021    CALCIUM 8.5 09/24/2021    BILITOT 0.4 09/24/2021    ALKPHOS 67 09/24/2021    AST 16 09/24/2021    ALT 8 09/24/2021              Assessment:    · Acute non-intractable headache undetermined etiology  · Slurred speech possibly secondary medication rule out strokelike symptoms  · Essential hypertension  · Elevated troponin possibly secondary demand ischemia rule out non-ST elevated myocardial infarction  · Noninsulin-dependent diabetes mellitus poorly controlled  · Obesity with elevated BMI of 35.75  · Hard of hearing  · Left carotid stenosis 50 to 69%.   Right less than 50%  · Hypothyroidism      Plan:       · Patient has been seen by neurology  · Recommended MRI but patient has declined  · Results of ultrasound discussed with patient and daughter  · Continue current treatment of aspirin per neurology  · Adjust blood pressure medication  · Speech and physical therapy  · Start low-dose Synthroid  · Observe for any cardiac dysrhythmia    More than 50% of my  time was spent at the bedside counseling/coordinating care with the patient and/or family with face to face contact. This time was spent reviewing notes and laboratory data as well as instructing and counseling the patient. Time I spent with the family or surrogate(s) is included only if the patient was incapable of providing the necessary information or participating in medical decisions. I also discussed the differential diagnosis and all of the proposed management plans with the patient and individuals accompanying the patient. Keisha requires this high level of physician care and nursing on the Telemetry unit due the complexity of decision management and chance of rapid decline or death. Continued cardiac monitoring and higher level of nursing are required. I am readily available for any further decision-making and intervention.      Leda Green DO, F.A.C.O.I.  9/24/2021  1:35 PM

## 2021-09-24 NOTE — PROGRESS NOTES
[address] [facility]  [facility]. [place]        Date:2021                                                  Patient Name: Ana Arias    MRN: [id number]    : 3/26/1928    Room: [facility]      Evaluating OT: Sabrina Means OTR/L; [id number]     Referring Provider and Specific Provider Orders/Date:      21  OT eval and treat Start: 21, End: 21, ONE TIME, Standing Count: 1 Occurrences, R      Teresa Lr, DO     Placement Recommendation: HHOT       Diagnosis:   1. Acute nonintractable headache, unspecified headache type    2. Essential hypertension    3. Hyperglycemia    4. Hypokalemia    5. Dysuria    6.  Slurred speech    7. Elevated troponin         Surgery: none       Pertinent Medical History:       Past Medical History:   Diagnosis Date    Arthritis     Bowel obstruction ([address]     Diabetes mellitus ([address]     Diverticulosis     Hyperlipidemia     Hypertension     Irritable bowel syndrome     Peptic ulcer disease     Spinal stenosis     Syncope and collapse     ad[facility] to [facility] due to quest TIA    Type 2 diabetes mellitus without complication ([address]          Past Surgical History:   Procedure Laterality Date    APPENDECTOMY      BREAST SURGERY Right     Lumpectomy    CHOLECYSTECTOMY      COLON SURGERY      COLONOSCOPY  2012    ECHO COMPL W DOP COLOR FLOW  2013         HYSTERECTOMY      JOINT REPLACEMENT      LTKA    KNEE ARTHROSCOPY      left knee    KNEE SURGERY      Right partial knee    PRE-MALIGNANT / BENIGN SKIN LESION EXCISION Left 2020    EXCISION OF LEFT SCALP CYST performed by Michelle Bruce MD at [address] [facility]      obstructed bowel    THYROID SURGERY      goiter removed    TONSILLECTOMY      TOTAL KNEE ARTHROPLASTY Right       Precautions:  Fall Risk     Assessment of current deficits [x] Functional mobility  [x]ADLs  [x] Strength               []Cognition    [x] Functional transfers   [x] IADLs         [] Safety Awareness   [x]Endurance    [] Fine Coordination              [x] Balance      [] Vision/perception   []Sensation     []Gross Motor Coordination  [] ROM  [] Delirium                   [] Motor Control     OT PLAN OF CARE   OT POC based on physician orders, patient diagnosis and results of clinical assessment    Frequency/Duration 1-3 days/wk for 2 weeks PRN     Specific OT Treatment Interventions to include:   * Instruction/training on adapted ADL techniques and AE recommendations to increase functional independence within precautions       * Training on energy conservation strategies, correct breathing pattern and techniques to improve independence/tolerance for self-care routine  * Functional transfer/mobility training/DME recommendations for increased independence, safety, and fall prevention  * Patient/Family education to increase follow through with safety techniques and functional independence  * Recommendation of environmental modifications for increased safety with functional transfers/mobility and ADLs  * Therapeutic exercise to improve motor endurance, ROM, and functional strength for ADLs/functional transfers  * Therapeutic activities to facilitate/challenge dynamic balance, stand tolerance for increased safety and independence with ADLs  * Positioning to improve skin integrity, interaction with environment and functional independence    Recommended Adaptive Equipment: none      Home Living: with her daughter (who is home 24/7); single family home, 1 story, 2 steps to enter with rail, walk in shower, bidet commodes.        Equipment owned: wheeled walker, bedit commodes, bedside commode, shower chair, grab bars, cane     Prior Level of Function: mostly independent with ADLs, daughter does provide assistance for bathing, needs assistance with IADLs; ambulated wheeled walker    Driving: no, daughter does the driving  Occupation: not working    Pain Level: no pain at time of evaluation; Nursing notified. Cognition: A&O: 3/4; Follows 1 step directions   Memory: good    Sequencing: good    Problem solving: good    Judgement/safety: good     [facility]   AM-PAC Daily Activity Inpatient   How much help for putting on and taking off regular lower body clothing?: A Little  How much help for Bathing?: A Lot  How much help for Toileting?: A Little  How much help for putting on and taking off regular upper body clothing?: A Little  How much help for taking care of personal grooming?: A Little  How much help for eating meals?: None  AM-PAC Inpatient Daily Activity Raw Score: 18  AM-PAC Inpatient ADL T-Scale Score : 38.66  ADL Inpatient CMS 0-100% Score: 46.65  ADL Inpatient CMS G-Code Modifier : CK     Functional Assessment:    Initial Eval Status  Date: 9/24/21 Treatment Status  Date: STGs = LTGs  Time frame: 10-14 days   Feeding Independent   Independent    Grooming Supervision   Independent    UB Dressing Minimal Assist   Independent    LB Dressing Minimal Assist   Independent    Bathing Moderate Assist to bathe back while seated on commode   Minimal Assist    Toileting Minimal Assist for hygiene after seated on commode   Independent    Bed Mobility  Supine to sit: Supervision   Sit to supine: N/T as pt was up in chair   Supine to sit: Independent   Sit to supine: Independent    Functional Transfers Supervision from EOB to wheeled walker  Minimal assist for transfer to and from low commode with minimal verbal cues to use grab bar for safe commode transfer. Transfer training with verbal cues for hand placement throughout session to improve safety. Independent    Functional Mobility Minimal Assist with wheeled walker to improve balance to and from bathroom and up to chair, verbal cues for walker sequence and safety.    Modified [place]    Balance Sitting:     Static: good includes thorough review of current medical information, gathering information on past medical history/social history and prior level of function, interpretation of standardized testing/informal observation of tasks, assessment of data and development of plan of care and goals.         Evaluating OT: Lg Carrasco OTR/L; [id number]

## 2021-09-24 NOTE — CARE COORDINATION
Ss note:9/24/2021 12:54 PM No Covid testing. Pt is from home w/her dtr Cecy one story, dtr supervised activities at home, pt preforms independently, dtr arranged medications. Pt has ww, shower seat, BSC at home. Pts PCP is Leo Jarvis phar to obtain medications. Therapy has been ordered, OT rec hhc, awaiting PT rec for any discharge needs. Pt is on room air. Ss to follow.  Delilah [place], [facility]

## 2021-09-24 NOTE — PROGRESS NOTES
Aid, Yanelis, went into room as bed alarm was going off-aide found patient out of bed with marmolejo catheter out and balloon intact. Yanelis did assist patient to bathroom. Patient have some bleeding when wiping. Charge nurse notified-will monitor bleeding. Telesitter initiated for patient safety.   Electronically signed by Anita Abraham RN on 9/24/2021 at 1:58 PM

## 2021-09-24 NOTE — PROGRESS NOTES
Physical Therapy Initial Evaluation/Plan of Care    Room #:  4126/5176-00  Patient Name: Bryce Adame  YOB: 1928  MRN: [id number]    Date of Service: 9/24/2021     Tentative placement recommendation: Home Health Physical Therapy  recommend speech consult  Equipment recommendation: Patient has needed equipment       Evaluating Physical Therapist: Froy Daily, PT #[id number]      Specific Provider Orders/Date/Referring Provider :  09/24/21 0900   PT eval and treat Start: 09/24/21 0900, End: 09/24/21 0900, ONE TIME, Standing Count: 1 Occurrences, R    Tho Das DO      Admitting Diagnosis:   Dysuria [R30.0]  Hypokalemia [E87.6]  Slurred speech [R47.81]  Essential hypertension [I10]  Hyperglycemia [R73.9]  Elevated troponin [R77.8]  Headache [R51.9]  Acute nonintractable headache, unspecified headache type [R51.9]     headache with swollen and tender lymph node behind left ear/left neck, beginning 2 days ago.  speech was slurred and speech was difficult to understand      Surgery: none  Visit Diagnoses       Codes    Acute nonintractable headache, unspecified headache type    -  Primary R51.9    Essential hypertension     I10    Hyperglycemia     R73.9    Hypokalemia     E87.6    Dysuria     R30.0    Slurred speech     R47.81    Elevated troponin     R77.8          Patient Active Problem List   Diagnosis    Hypertensive emergency    Near syncope    Diabetes mellitus type 2, controlled ([address]    Hypertriglyceridemia    Diabetic neuropathy ([address]    Essential hypertension, benign    Vertebrobasilar TIAs    Diverticulosis    Bowel obstruction (HCC)    Irritable bowel syndrome    Peptic ulcer disease    Syncope and collapse    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 No dysmetria or difficulty with rapid alternating movement. Patient requires skilled physical therapy to address concerns listed above to increase safety and independence at discharge. PHYSICAL THERAPY  PLAN OF CARE       Physical therapy plan of care is established based on physician order,  patient diagnosis and clinical assessment    Current Treatment Recommendations:    -Bed Mobility: Lower extremity exercises , Upper extremity exercises  and Trunk control activities   -Sitting Balance: Incorporate reaching activities to activate trunk muscles   -Standing Balance: Perform strengthening exercises in standing to promote motor control with or without upper extremity support   -Transfers: Provide instruction on proper hand and foot position for adequate transfer of weight onto lower extremities and use of gait device if needed and Cues for hand placement, technique and safety. Provide stabilization to prevent fall   -Gait: Gait training, Standing activities to improve: base of support, weight shift, weight bearing  and Pregait training to emphasize: Device approximation and Safety   -Endurance: Utilize Supervised activities to increase level of endurance to allow for safe functional mobility including transfers and gait   -Stairs: Stair training with instruction on proper technique and hand placement on rail    PT long term treatment goals are located in below grid    Patient and or family understand(s) diagnosis, prognosis, and plan of care. Frequency of treatments: Patient will be seen  daily.          Prior Level of Function: Patient ambulated with wheeled walker    Rehab Potential: good    for baseline    Past medical history:   Past Medical History:   Diagnosis Date    Arthritis     [facility] ([facility]     Diabetes mellitus ([facility]     Diverticulosis     Hyperlipidemia     Hypertension     Irritable bowel syndrome     Peptic ulcer disease     Spinal stenosis     Syncope and collapse 2013    ad[facility] to [facility] due to quest TIA    Type 2 diabetes mellitus without complication [facility])      Past Surgical History:   Procedure Laterality Date    APPENDECTOMY      BREAST SURGERY Right     Lumpectomy    CHOLECYSTECTOMY      COLON SURGERY      COLONOSCOPY  08/2012    ECHO COMPL W DOP COLOR FLOW  04/22/2013         HYSTERECTOMY      JOINT REPLACEMENT      LTKA    KNEE ARTHROSCOPY      left knee    KNEE SURGERY      Right partial knee    PRE-MALIGNANT / BENIGN SKIN LESION EXCISION Left 02/04/2020    EXCISION OF LEFT SCALP CYST performed by Carolyn Leggett MD at [address] [place] [address]      obstructed bowel    THYROID SURGERY      goiter removed    TONSILLECTOMY      TOTAL KNEE ARTHROPLASTY Right        SUBJECTIVE:    Precautions:  , falls ,    Social history: Patient lives with daughter in a ranch home  with 2 steps  to enter with [employer] in shower grab bars bidet commode  Equipment owned: Wheelchair, [place], [address], Rollator, Bedside commode, Shower chair and knee walker,   bidet commodes    AM-PAC Basic Mobility        AM-PAC Mobility Inpatient   How much difficulty turning over in bed?: A Little  How much difficulty sitting down on / standing up from a chair with arms?: A Little  How much difficulty moving from lying on back to sitting on side of bed?: A Little  How much help from another person moving to and from a bed to a chair?: A Little  How much help from another person needed to walk in hospital room?: A Little  How much help from another person for climbing 3-5 steps with a railing?: A Little  AM-PAC Inpatient Mobility Raw Score : 18  AM-PAC Inpatient T-Scale Score : 43.63  Mobility Inpatient CMS 0-100% Score: 46.58  Mobility Inpatient CMS G-Code Modifier : CK    Nursing cleared patient for PT evaluation.  The admitting diagnosis and active problem list as listed above have been reviewed prior to the initiation of this evaluation. OBJECTIVE;   Initial Evaluation  Date: 9/24/2021 Treatment Date:     Short Term/ Long Term   Goals   Was pt agreeable to Eval/treatment? Yes  To be met in 3 days   Pain level   0/10        Bed Mobility    Rolling: Minimal assist of 1    Supine to sit: Minimal assist of 1    Sit to supine: Not assessed     Scooting: Minimal assist of 1    Rolling: Independent    Supine to sit:  Independent    Sit to supine: Independent    Scooting: Independent     Transfers Sit to stand: Minimal assist of 1 Cues for hand placement and safety   Sit to stand: Modified Independent     Ambulation    1 x 20 feet, 1 x 150 feet using  wheeled walker with Minimal assist of 1   cues for sequencing, upright posture and safety   150 feet using  wheeled walker with Modified Independent    Stair negotiation: ascended and descended   Not assessed     2 steps with rail minimal assist for safety    ROM Within functional limits    Increase range of motion 10% of affected joints    Strength BUE:  refer to OT eval  RLE:  3+/5  LLE:  3+/5  Increase strength in affected mm groups by 1/3 grade   Balance Sitting EOB:  good -  Dynamic Standing:  fair +wheeled walker   Sitting EOB:  good    Dynamic Standing: good wheeled walker      Patient is Alert & Oriented x person, place, time and situation and follows directions    Sensation:  Patient  denies numbness/tingling   Edema:  none noted   Endurance: fair       Vitals: room air   Blood Pressure at rest  Blood Pressure during session    Heart Rate at rest 69 Heart Rate during session     SPO2 at rest 97%  SPO2 during session  %     Patient education  Patient educated on role of Physical Therapy, risks of immobility, safety and plan of care,  importance of mobility while in hospital  and ankle pumps, quad set and glut set for edema control, blood clot prevention     Patient response to education:   Pt verbalized understanding Pt demonstrated skill Pt requires further education in this area   Yes Partial Yes      Treatment:  Patient practiced and was instructed/facilitated in the following treatment: Patient assisted from transport cart Sat edge of bed 3 minutes with Minimal assist of 1 to increase dynamic sitting balance and activity tolerance. ambulated in room, assisted to chair. Ambulated in hallway, returned to chair and performed exercises with daughter present. Therapeutic Exercises:  ankle pumps, heel raises, glut sets, long arc quad and seated marching  x 15-20 reps. At end of session, patient in chair with   call light and phone within reach,  all lines and tubes intact, nursing notified. Patient would benefit from continued skilled Physical Therapy to improve functional independence and quality of life. Patient's/ family goals   home    Time in  1036  Time out  1107    Total Treatment Time  11 minutes    Evaluation time includes thorough review of current medical information, gathering information on past medical history/social history and prior level of function, completion of standardized testing/informal observation of tasks, assessment of data, and development of Plan of care and goals.      CPT codes:  Low Complexity PT evaluation (34684)  Gait Training (44036) 11 minutes 1 unit(s)    Komal Wilkinson, PT

## 2021-09-24 NOTE — PROGRESS NOTES
Room #: 1599/1269-26  Date: 2021       Patient Name: Sandi Tellez  : 3/26/1928      MRN: [id number]     Patient unavailable for physical therapy eval due to off floor at [facility]. Will attempt PT evaluation at a later time. Thank you.        Verenice Quinn, PT

## 2021-09-25 VITALS
DIASTOLIC BLOOD PRESSURE: 79 MMHG | RESPIRATION RATE: 18 BRPM | TEMPERATURE: 98.1 F | OXYGEN SATURATION: 97 % | SYSTOLIC BLOOD PRESSURE: 186 MMHG | WEIGHT: 177 LBS | HEIGHT: 57 IN | HEART RATE: 90 BPM | BODY MASS INDEX: 38.19 KG/M2

## 2021-09-25 LAB
ALBUMIN SERPL-MCNC: 3.4 G/DL (ref 3.5–5.2)
ALP BLD-CCNC: 65 U/L (ref 35–104)
ALT SERPL-CCNC: 7 U/L (ref 0–32)
ANION GAP SERPL CALCULATED.3IONS-SCNC: 7 MMOL/L (ref 7–16)
AST SERPL-CCNC: 16 U/L (ref 0–31)
BASOPHILS ABSOLUTE: 0.03 E9/L (ref 0–0.2)
BASOPHILS RELATIVE PERCENT: 0.4 % (ref 0–2)
BILIRUB SERPL-MCNC: 0.4 MG/DL (ref 0–1.2)
BUN BLDV-MCNC: 22 MG/DL (ref 6–23)
CALCIUM SERPL-MCNC: 8.4 MG/DL (ref 8.6–10.2)
CHLORIDE BLD-SCNC: 112 MMOL/L (ref 98–107)
CO2: 23 MMOL/L (ref 22–29)
CREAT SERPL-MCNC: 0.9 MG/DL (ref 0.5–1)
EOSINOPHILS ABSOLUTE: 0.1 E9/L (ref 0.05–0.5)
EOSINOPHILS RELATIVE PERCENT: 1.4 % (ref 0–6)
GFR AFRICAN AMERICAN: >60
GFR NON-AFRICAN AMERICAN: 58 ML/MIN/1.73
GLUCOSE BLD-MCNC: 219 MG/DL (ref 74–99)
HCT VFR BLD CALC: 36.8 % (ref 34–48)
HEMOGLOBIN: 11.8 G/DL (ref 11.5–15.5)
IMMATURE GRANULOCYTES #: 0.08 E9/L
IMMATURE GRANULOCYTES %: 1.1 % (ref 0–5)
LYMPHOCYTES ABSOLUTE: 1.82 E9/L (ref 1.5–4)
LYMPHOCYTES RELATIVE PERCENT: 24.7 % (ref 20–42)
MCH RBC QN AUTO: 28.8 PG (ref 26–35)
MCHC RBC AUTO-ENTMCNC: 32.1 % (ref 32–34.5)
MCV RBC AUTO: 89.8 FL (ref 80–99.9)
METER GLUCOSE: 202 MG/DL (ref 74–99)
MONOCYTES ABSOLUTE: 0.66 E9/L (ref 0.1–0.95)
MONOCYTES RELATIVE PERCENT: 9 % (ref 2–12)
NEUTROPHILS ABSOLUTE: 4.67 E9/L (ref 1.8–7.3)
NEUTROPHILS RELATIVE PERCENT: 63.4 % (ref 43–80)
PDW BLD-RTO: 14.4 FL (ref 11.5–15)
PLATELET # BLD: 202 E9/L (ref 130–450)
PMV BLD AUTO: 11.9 FL (ref 7–12)
POTASSIUM SERPL-SCNC: 4.4 MMOL/L (ref 3.5–5)
RBC # BLD: 4.1 E12/L (ref 3.5–5.5)
SODIUM BLD-SCNC: 142 MMOL/L (ref 132–146)
TOTAL PROTEIN: 5.8 G/DL (ref 6.4–8.3)
WBC # BLD: 7.4 E9/L (ref 4.5–11.5)

## 2021-09-25 PROCEDURE — 6370000000 HC RX 637 (ALT 250 FOR IP): Performed by: INTERNAL MEDICINE

## 2021-09-25 PROCEDURE — G0378 HOSPITAL OBSERVATION PER HR: HCPCS

## 2021-09-25 PROCEDURE — 82962 GLUCOSE BLOOD TEST: CPT

## 2021-09-25 PROCEDURE — 85025 COMPLETE CBC W/AUTO DIFF WBC: CPT

## 2021-09-25 PROCEDURE — 36415 COLL VENOUS BLD VENIPUNCTURE: CPT

## 2021-09-25 PROCEDURE — 6370000000 HC RX 637 (ALT 250 FOR IP): Performed by: EMERGENCY MEDICINE

## 2021-09-25 PROCEDURE — 80053 COMPREHEN METABOLIC PANEL: CPT

## 2021-09-25 RX ORDER — LEVOTHYROXINE SODIUM 0.03 MG/1
25 TABLET ORAL DAILY
Qty: 30 TABLET | Refills: 3 | Status: SHIPPED | OUTPATIENT
Start: 2021-09-26 | End: 2021-09-28

## 2021-09-25 RX ORDER — ASPIRIN 81 MG/1
81 TABLET, CHEWABLE ORAL DAILY
Qty: 30 TABLET | Refills: 3 | Status: SHIPPED | OUTPATIENT
Start: 2021-09-25 | End: 2022-01-14 | Stop reason: SDUPTHER

## 2021-09-25 RX ADMIN — INSULIN LISPRO 4 UNITS: 100 INJECTION, SOLUTION INTRAVENOUS; SUBCUTANEOUS at 09:18

## 2021-09-25 RX ADMIN — LEVOTHYROXINE SODIUM 25 MCG: 25 TABLET ORAL at 06:04

## 2021-09-25 RX ADMIN — FERROUS SULFATE TAB 325 MG (65 MG ELEMENTAL FE) 325 MG: 325 (65 FE) TAB at 09:14

## 2021-09-25 RX ADMIN — ASPIRIN 81 MG CHEWABLE TABLET 81 MG: 81 TABLET CHEWABLE at 09:13

## 2021-09-25 RX ADMIN — CLOTRIMAZOLE 10 MG: 10 LOZENGE ORAL; TOPICAL at 06:04

## 2021-09-25 RX ADMIN — METOPROLOL TARTRATE 25 MG: 25 TABLET, FILM COATED ORAL at 09:13

## 2021-09-25 RX ADMIN — LOSARTAN POTASSIUM 100 MG: 100 TABLET, FILM COATED ORAL at 09:13

## 2021-09-25 ASSESSMENT — PAIN SCALES - GENERAL: PAINLEVEL_OUTOF10: 0

## 2021-09-25 NOTE — PROGRESS NOTES
Per Dr Quinton Madrigal home care may start on Wednesday .  Electronically signed by Rosaline Cain RN on 9/25/2021 at 11:39 AM

## 2021-09-25 NOTE — DISCHARGE SUMMARY
Internal Medicine Progress Note     [facility]UL[facility]     Mirella Remy. Fariha Warner, OCTAVIO Aburto D.O., OCTAVIO Gauthier D.O. Josue Chapin, MSN, APRN, NP-C  Lisa Santiago. [facility], MSN, APRN-CNP       Internal Medicine  Discharge Summary    NAME: Derick Oneal  :  3/26/1928  MRN:  [id number]  Kalie Mejia MD  ADMITTED: 2021      DISCHARGED: 21    ADMITTING PHYSICIAN: Mirella Green DO    CONSULTANT(S):   IP CONSULT TO NEUROLOGY  IP CONSULT TO SOCIAL WORK     ADMITTING DIAGNOSIS:   Dysuria [R30.0]  Hypokalemia [E87.6]  Slurred speech [R47.81]  Essential hypertension [I10]  Hyperglycemia [R73.9]  Elevated troponin [R77.8]  Headache [R51.9]  Acute nonintractable headache, unspecified headache type [R51.9]     DISCHARGE DIAGNOSES:   1. High clinical suspicion for acute CVA with mild aphasia as a residual deficit  2. Essential hypertension  3. Elevated troponin possibly secondary demand ischemia rule out non-ST elevated myocardial infarction  4. Non-insulin-dependent diabetes mellitus type 2  5. Obesity with elevated BMI of 35.75  6. Hard of hearing  7. Left carotid stenosis 50 to 69%. Right less than 50%  8. Hypothyroidism    BRIEF HISTORY OF PRESENT ILLNESS:   This is a 80year old female who presented to the hospital with a complaint of headache. States that when she went to bed her neck and left ear area started to hurt so she put ice on it. The pain woke her up off and on all night. States it was an annoying ache. Denies injury or trauma. She she woke up this morning she had a 10/10 headache. Admitted to phonophobia. No photophobia. She resides with her daughter. This morning her daughter noticed the patient's speech was slurred and speech was difficult to understand. As per daughter headaches are chronic. No weakness.  After evaluationbthe the ED it was determined the patient would be admitted for further evaluation and treatmentbunder the service of Dr. Rebeka Caballero and Dr. Tianna Mojica. LABS[de-identified]  Lab Results   Component Value Date    WBC 7.4 09/25/2021    HGB 11.8 09/25/2021    HCT 36.8 09/25/2021     09/25/2021     09/25/2021    K 4.4 09/25/2021     (H) 09/25/2021    CREATININE 0.9 09/25/2021    BUN 22 09/25/2021    CO2 23 09/25/2021    GLUCOSE 219 (H) 09/25/2021    ALT 7 09/25/2021    AST 16 09/25/2021    INR 1.1 06/12/2021     Lab Results   Component Value Date    INR 1.1 06/12/2021    INR 1.1 03/01/2021    INR 1.1 04/20/2013    PROTIME 12.6 (H) 06/12/2021    PROTIME 12.4 03/01/2021    PROTIME 12.3 04/20/2013      Lab Results   Component Value Date    TSH 4.900 (H) 09/24/2021     Lab Results   Component Value Date    TRIG 298 (H) 09/24/2021    TRIG 191 02/12/2021    TRIG 252 (H) 04/21/2013     Lab Results   Component Value Date    HDL 23 09/24/2021    HDL 25 (A) 02/12/2021    HDL 27.5 (A) 04/21/2013     Lab Results   Component Value Date    LDLCALC 44 09/24/2021    LDLCALC 43 02/12/2021    LDLCALC 83 04/21/2013     Lab Results   Component Value Date    LABA1C 7.5 (H) 09/24/2021       IMAGING:  Echo Complete    Result Date: 9/24/2021  Transthoracic Echocardiography Report (TTE)  Demographics   Patient Name       Minerva Burn Gender               Female   Medical Record     [id number]       Room Number          5711  Number   Account #          [de-identified]      Procedure Date       09/24/2021   Corporate ID                      Ordering Physician   Damian Chino DO   Accession Number   [id number]     Referring Physician   Date of Birth      03/26/1928     Sonographer          Vladimir Welsh RDCS   Age                80 year(s)     Interpreting         Damian Chino DO                                    Physician                                     Any Other  Procedure Type of Study   TTE procedure:Echo Complete W/Doppler & Color Flow.   Procedure Date Date: 09/24/2021 Start: 09:59 AM Study Location: Echo Lab Technical ----------------------------------------------------------------  M-Mode/2D Measurements & Calculations   LV Diastolic    LV Systolic Dimension: 2.1  AV Cusp Separation: 1.6 cmLA  Dimension: 3.5  cm                          Dimension: 4.1 cmAO Root  cm              LV Volume Diastolic: 50 ml  Dimension: 3 cm  LV FS:40 %      LV Volume Systolic: 14 ml  LV PW           LV EDV/LV EDV Index: 50  Diastolic: 1.5  YP/02 QW/M^3OE ESV/LV ESV  cm              Index: 14 ml/8ml/ m^2       RV Diastolic Dimension: 2.7 cm  LV PW Systolic: EF Calculated: 72 %         RV Systolic Dimension: 1.8 cm  2 cm            LV Mass Index: 111          LA/Aorta: 1.4  Septum          l/min*m^2  Diastolic: 1.5                              LA volume/Index: 66.8 ml  cm  Septum          LVOT: 2 cm  Systolic: 2.2  cm   LV Mass: 193.42  g  Doppler Measurements & Calculations   MV Peak E-Wave:   AV Peak Velocity: 1.66 m/s    LVOT Peak Velocity: 1.16  0.85 m/s          AV Peak Gradient: 10.96 mmHg  m/s  MV Peak A-Wave:   AV Mean Velocity: 1.15 m/s    LVOT Mean Velocity: 0.73  1.24 m/s          AV Mean Gradient: 5.9 mmHg    m/s  MV E/A Ratio:     AV VTI: 30.7 cm               LVOT Peak Gradient: 5.4  0.69              AV Area (Continuity):2.6 cm^2 mmHgLVOT Mean Gradient:  MV Peak Gradient:                               2.5 mmHg  7.6 mmHg          LVOT VTI: 25.4 cm             Estimated RVSP: 21.9 mmHg  MV Mean Gradient:                               Estimated RAP:10 mmHg  2.9 mmHg          Estimated PASP: 21.94 mmHg  MV Mean Velocity: Pulm. Vein A Reversal  0.78 m/s          Duration:129.2 msec           TR Velocity:1.73 m/s  MV Deceleration   Pulm. Vein D Velocity:0.33    TR Gradient:11.94 mmHg  Time: 296.4 msec  m/sPulm. Vein A Reversal      PV Peak Velocity: 1.17 m/s  MV P1/2t: 59.8    Velocity:0.33 m/s             PV Peak Gradient: 5.49  msec              Pulm.  Vein S Velocity: 0.53   mmHg  MVA by PHT:3.68   m/s                           PV Mean Velocity: 0.84 m/s  cm^2                                            PV Mean Gradient: 3.1 mmHg  MV Area  (continuity): 2.1  cm^2  [email or web address] [id number]    CT HEAD WO CONTRAST    Result Date: 9/23/2021  EXAMINATION: CT OF THE HEAD WITHOUT CONTRAST  9/23/2021 8:22 am TECHNIQUE: CT of the head was performed without the administration of intravenous contrast. Dose modulation, iterative reconstruction, and/or weight based adjustment of the mA/kV was utilized to reduce the radiation dose to as low as reasonably achievable. COMPARISON: 08/29/2021 the HISTORY: ORDERING SYSTEM PROVIDED HISTORY: Evaluate intracranial abnormality TECHNOLOGIST PROVIDED HISTORY: Has a \"code stroke\" or \"stroke alert\" been called? ->No Reason for exam:->Evaluate intracranial abnormality Decision Support Exception - unselect if not a suspected or confirmed emergency medical condition->Emergency Medical Condition (MA) FINDINGS: BRAIN/VENTRICLES: There is no acute intracranial hemorrhage, mass effect or midline shift. No abnormal extra-axial fluid collection. The gray-white differentiation is maintained without evidence of an acute infarct. There is no evidence of hydrocephalus. There is patchy bilateral white matter low attenuation, compatible with chronic microvascular ischemic changes. There are involutional changes, with prominence of the ventricles and sulci. There is a chronic lacunar infarct in the right basal ganglia. ORBITS: The visualized portion of the orbits demonstrate no acute abnormality. SINUSES: The visualized paranasal sinuses and mastoid air cells demonstrate no acute abnormality. SOFT TISSUES/SKULL:  No acute abnormality of the visualized skull or soft tissues. No acute osseous abnormality.      CT HEAD WO CONTRAST    Result Date: 8/29/2021  EXAMINATION: CT OF THE HEAD WITHOUT CONTRAST  8/29/2021 6:17 pm TECHNIQUE: CT of the head was performed without the administration of intravenous contrast. Dose modulation, iterative reconstruction, and/or weight based adjustment of the mA/kV was utilized to reduce the radiation dose to as low as reasonably achievable. COMPARISON: March 19, 2021 HISTORY: ORDERING SYSTEM PROVIDED HISTORY: dizzy and HA TECHNOLOGIST PROVIDED HISTORY: Has a \"code stroke\" or \"stroke alert\" been called? ->No Reason for exam:->dizzy and HA Decision Support Exception - unselect if not a suspected or confirmed emergency medical condition->Emergency Medical Condition (MA) FINDINGS: BRAIN/VENTRICLES: There are age related cortical atrophy and periventricular white matter ischemic changes. There is no acute intracranial hemorrhage, mass effect or midline shift. No abnormal extra-axial fluid collection. The gray-white differentiation is maintained without evidence of an acute infarct. There is no evidence of hydrocephalus. Stable old lacunar infarct in the right thalamus. ORBITS: The visualized portion of the orbits demonstrate no acute abnormality. SINUSES: The visualized paranasal sinuses and mastoid air cells demonstrate no acute abnormality. SOFT TISSUES/SKULL:  No acute abnormality of the visualized skull or soft tissues. No acute intracranial abnormality. 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 XR CHEST PORTABLE    Result Date: 9/23/2021  EXAMINATION: ONE XRAY VIEW OF THE CHEST 9/23/2021 8:22 am COMPARISON: Previous CT of the chest of 03/19/2021 HISTORY: ORDERING SYSTEM PROVIDED HISTORY: Possible Stroke TECHNOLOGIST PROVIDED HISTORY: Reason for exam:->Possible Stroke FINDINGS: The cardiac silhouette is within normals. There is no mediastinal widening. The lungs are clear. There is no focal infiltrate or effusion. There is mild prominence of the peripheral interstitial markings consistent with mild fibrosis which was confirmed on the CT scan of 03/19/2021. There is no pleural effusion. 1. There are no findings of pneumonia or failure 2. Mild interstitial fibrotic changes. US CAROTID ARTERY BILATERAL    Result Date: 9/24/2021  EXAMINATION: ULTRASOUND EVALUATION OF THE CAROTID ARTERIES 9/23/2021 TECHNIQUE: Duplex ultrasound using B-mode/gray scaled imaging, Doppler spectral analysis and color flow Doppler was obtained of the carotid arteries. COMPARISON: None. HISTORY: ORDERING SYSTEM PROVIDED HISTORY: stroke like symptoms TECHNOLOGIST PROVIDED HISTORY: Reason for exam:->stroke like symptoms What reading provider will be dictating this exam?->CRC FINDINGS: RIGHT: There is calcified plaque at the right carotid bulb. No significantly increased flow velocity to suggest a hemodynamically significant stenosis on the right side. The external carotid artery is patent. The vertebral artery demonstrates normal antegrade flow. ICA/CCA ratio of 0.8 LEFT: Calcified atherosclerotic plaque present at the left carotid bulb. Minimally increased flow velocity at the proximal left internal carotid artery measuring 129/26 centimeters/second. The external carotid artery is patent demonstrating increased flow velocity of 190/10 centimeters/second. .  The vertebral artery demonstrates normal antegrade flow. No evidence of focal atherosclerotic plaque. ICA/CCA ratio of 1.3     The right internal carotid artery demonstrates 0-50% stenosis. The left internal carotid artery demonstrates 50-69 stenosis, likely at the lower end of this range. Bilateral vertebral arteries are patent with flow in the normal direction. HOSPITAL COURSE:   Nahed Kerr presented to [facility] emergency department for the evaluation of headache with mild slurred speech. There was clinical concern for CVA but the patient deferred MRI evaluation.   She did undergo carotid ultrasound which did indicate some degree of stenosis and she is agreeable to follow-up with vascular as an outpatient as we do not provide this service here at the hospital.  She was evaluated by the neurology team who is recommending monotherapy antiplatelets with aspirin 81 mg daily. The patient was visualized ambulating from the bathroom to her bedside today without difficulty. She has as needed DME equipment at home and will be arranged with home health care. She lives with her daughter. The patient's blood pressure is better controlled. Her diabetic medications have been adjusted. Again, we have high clinical suspicion for CVA but the patient defers MRI evaluation. She is agreeable to 81 mg of aspirin. She defers cholesterol medications as well. Otherwise, she is acceptable for discharge home. BRIEF PHYSICAL EXAMINATION AND LABORATORIES ON DAY OF DISCHARGE:  VITALS:  BP (!) 186/79   Pulse 90   Temp 98.1 °F (36.7 °C) (Oral)   Resp 18   Ht 4' 9\" (1.448 m)   Wt 177 lb (80.3 kg)   SpO2 97%   BMI 38.30 kg/m²     HEENT:  PERRLA. EOMI. Sclera clear. Buccal mucosa moist.    Neck:  Supple. Trachea midline. No thyromegaly. No JVD. No bruits. Heart:  Rhythm regular, rate controlled. No murmurs. Lungs:  Symmetrical. Clear to auscultation bilaterally. No wheezes. No rhonchi. No rales. Abdomen: Soft. Non-tender. Non-distended. Bowel sounds positive. No organomegaly or masses. No pain on palpation    Extremities:  Peripheral pulses present. No peripheral edema. No ulcers. Neurologic:  Alert x 3. Mild aphasia. Skin:  No petechia. No hemorrhage. No wounds. DISPOSITION:  The patient's condition is good. At this time the patient is without objective evidence of an acute process requiring continuing hospitalization or inpatient management. They are stable for discharge with outpatient follow-up. I have spoken with the patient and discussed the results of the current hospitalization, in addition to providing specific details for the plan of care and counseling regarding the diagnosis and prognosis.   The plan has been discussed in detail and they are aware of the specific conditions for emergent return, as well as the importance of follow-up. Their questions are answered at this time and they are agreeable with the plan for discharge to home    DISCHARGE MEDICATIONS:    Ronel Regan   Home Medication Instructions RKT:[id number]    Printed on:09/25/21 1358   Medication Information                      amLODIPine (NORVASC) 5 MG tablet  Take 5 mg by mouth every evening             aspirin 81 MG chewable tablet  Take 1 tablet by mouth daily             diphenhydrAMINE-APAP, sleep, (TYLENOL PM EXTRA STRENGTH)  MG tablet  Take 1 tablet by mouth daily as needed (Pain)             ferrous sulfate (IRON 325) 325 (65 Fe) MG tablet  Take 162.5 mg by mouth every other day              gabapentin (NEURONTIN) 100 MG capsule  Take 100 mg by mouth daily (before lunch). gabapentin (NEURONTIN) 100 MG capsule  Take 100 mg by mouth daily as needed (Pain). glimepiride (AMARYL) 4 MG tablet  Take 1 tablet by mouth every morning (before breakfast)             levothyroxine (SYNTHROID) 25 MCG tablet  Take 1 tablet by mouth Daily             linagliptin (TRADJENTA) 5 MG tablet  Take 1 tablet by mouth daily             losartan (COZAAR) 100 MG tablet  Take 1 tablet by mouth daily             metoprolol tartrate (LOPRESSOR) 25 MG tablet  Take 1 tablet by mouth 2 times daily             nystatin-triamcinolone (MYCOLOG) 714159-3.1 UNIT/GM-% ointment  Apply 1 each topically 2 times daily as needed (Itching)              promethazine (PHENERGAN) 25 MG tablet  Take 1 tablet by mouth every 6 hours as needed for Nausea             psyllium (METAMUCIL) 28 % packet  Take 1 packet by mouth 2 times daily as needed (Constipation)                  FOLLOW UP/INSTRUCTIONS:  · This patient is instructed to follow-up with her primary care physician. · Patient is instructed to follow-up with the consults listed above as directed by them.   · she is instructed to resume home medications and take new medications as indicated in the list above. · If the patient has a recurrence of symptoms, she is instructed to go to the ED. Preparing for this patient's discharge, including paperwork, orders, instructions, and meeting with patient did require > 40 minutes.     Luiz Harry DO     9/25/2021  8:23 AM

## 2021-09-25 NOTE — HOME CARE
[facility] [facility] referral received. Spoke with patient's daughter Daksha Polo, demographics verified. Plan to start services on 9/ 29/21. Lyndsey Acuna LPN [facility] [facility] 9.

## 2021-09-25 NOTE — CARE COORDINATION
SS NOTE: NO COVID TESTING. Pt dched today and SW spoke with her dtr Cecy. She has chosen [facility] for [facility] orders written. Pt will begin service on Wednesday of next week. Pt's dtr approves of this plan and will transport pt home today by care. TIFFANIE Maddox.9/25/2021.10:04 AM.

## 2021-09-28 ENCOUNTER — OFFICE VISIT (OUTPATIENT)
Dept: PRIMARY CARE CLINIC | Age: 86
End: 2021-09-28
Payer: MEDICARE

## 2021-09-28 VITALS
BODY MASS INDEX: 35.14 KG/M2 | TEMPERATURE: 97.5 F | SYSTOLIC BLOOD PRESSURE: 150 MMHG | OXYGEN SATURATION: 97 % | HEART RATE: 100 BPM | HEIGHT: 57 IN | WEIGHT: 162.9 LBS | DIASTOLIC BLOOD PRESSURE: 70 MMHG

## 2021-09-28 DIAGNOSIS — I63.9 CEREBROVASCULAR ACCIDENT (CVA), UNSPECIFIED MECHANISM (HCC): ICD-10-CM

## 2021-09-28 DIAGNOSIS — I10 ESSENTIAL HYPERTENSION: Primary | ICD-10-CM

## 2021-09-28 PROCEDURE — 99213 OFFICE O/P EST LOW 20 MIN: CPT | Performed by: STUDENT IN AN ORGANIZED HEALTH CARE EDUCATION/TRAINING PROGRAM

## 2021-09-28 PROCEDURE — 1111F DSCHRG MED/CURRENT MED MERGE: CPT | Performed by: STUDENT IN AN ORGANIZED HEALTH CARE EDUCATION/TRAINING PROGRAM

## 2021-09-28 RX ORDER — CARVEDILOL 3.12 MG/1
3.12 TABLET ORAL 2 TIMES DAILY
Qty: 60 TABLET | Refills: 3 | Status: SHIPPED
Start: 2021-09-28 | End: 2021-11-18 | Stop reason: SDUPTHER

## 2021-09-28 RX ORDER — CLOTRIMAZOLE 10 MG/1
10 LOZENGE ORAL; TOPICAL
COMMUNITY
End: 2021-10-11 | Stop reason: ALTCHOICE

## 2021-09-28 ASSESSMENT — ENCOUNTER SYMPTOMS
EYES NEGATIVE: 1
GASTROINTESTINAL NEGATIVE: 1
RESPIRATORY NEGATIVE: 1

## 2021-09-28 NOTE — PROGRESS NOTES
 gabapentin (NEURONTIN) 100 MG capsule Take 100 mg by mouth daily (before lunch).  gabapentin (NEURONTIN) 100 MG capsule Take 100 mg by mouth daily as needed (Pain).  diphenhydrAMINE-APAP, sleep, (TYLENOL PM EXTRA STRENGTH)  MG tablet Take 1 tablet by mouth daily as needed (Pain)      nystatin-triamcinolone (MYCOLOG) 875091-2.1 UNIT/GM-% ointment Apply 1 each topically 2 times daily as needed (Itching)       linagliptin (TRADJENTA) 5 MG tablet Take 1 tablet by mouth daily 90 tablet 0    glimepiride (AMARYL) 4 MG tablet Take 1 tablet by mouth every morning (before breakfast) 90 tablet 0    losartan (COZAAR) 100 MG tablet Take 1 tablet by mouth daily 90 tablet 0    metoprolol tartrate (LOPRESSOR) 25 MG tablet Take 1 tablet by mouth 2 times daily 180 tablet 0    ferrous sulfate (IRON 325) 325 (65 Fe) MG tablet Take 162.5 mg by mouth every other day       psyllium (METAMUCIL) 28 % packet Take 1 packet by mouth 2 times daily as needed (Constipation)           Medications patient taking as of now reconciled against medications ordered at time of hospital discharge: {YES / FJ:15435}    Chief Complaint   Patient presents with    Follow-Up from Hospital     2 day stay at [place] [id number]. Other     family took bp this am on rising and resulted with 163/100 then am medications for bp were given.  Other     self glucose 253       History of Present illness - Follow up of Hospital diagnosis(es): ***    Inpatient course: Discharge summary reviewed- see chart. Interval history/Current status: ***    Vitals:    09/28/21 0852 09/28/21 0906   BP: (!) 160/74 (!) 150/70   Site: Left Upper Arm    Position: Sitting    Cuff Size: Large Adult    Pulse: 100    Temp: 97.5 °F (36.4 °C)    SpO2: 97%    Weight: 162 lb 14.4 oz (73.9 kg)    Height: 4' 9\" (1.448 m)      Body mass index is 35.25 kg/m².    Wt Readings from Last 3 Encounters:   09/28/21 162 lb 14.4 oz (73.9 kg)   09/23/21 177 lb (80.3 kg)   09/14/21 165 lb 3.2 oz (74.9 kg)     BP Readings from Last 3 Encounters:   09/28/21 (!) 150/70   09/25/21 (!) 186/79   09/14/21 (!) 140/54       {OPTIONAL WHEN USING 38040 - ROS (THIS WILL AUTO-DELETE IF NOT USED) :[id number]::\"A comprehensive review of systems was negative except for what was noted in the HPI. \"}    Physical Exam:  {GENERAL PHYSICAL EXAM:19990}    Assessment/Plan:  There are no diagnoses linked to this encounter.       Medical Decision Making: {TCMPN4:16316}

## 2021-09-28 NOTE — PROGRESS NOTES
Post-Discharge Transitional Care Management Services or Hospital Follow Up      Huma Neville   YOB: 1928    Date of Office Visit:  9/28/2021  Date of Hospital Admission: 9/23/21  Date of Hospital Discharge: 9/25/21  Readmission Risk Score(high >=14%. Medium >=10%):Readmission Risk Score: 17      Care management risk score Rising risk (score 2-5) and Complex Care (Scores >=6): 1     Non face to face  following discharge, date last encounter closed (first attempt may have been earlier): *No documented post hospital discharge outreach found in the last 14 days *No documented post hospital discharge outreach found in the last 14 days    Call initiated 2 business days of discharge: *No response recorded in the last 14 days     Patient Active Problem List   Diagnosis    Hypertensive emergency    Near syncope    Diabetes mellitus type 2, controlled ([address]    Hypertriglyceridemia    Diabetic neuropathy [facility]    Essential hypertension, benign    Vertebrobasilar TIAs    Diverticulosis    Bowel obstruction ([address]    Irritable bowel syndrome    Peptic ulcer disease    Syncope and collapse    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  Morphine Other (See Comments)     Makes her feel goofy.        Medications listed as ordered at the time of discharge from [facility], [address] Medication Instructions SARA:    Printed on:09/28/21 1616   Medication Information                      amLODIPine (NORVASC) 5 MG tablet  Take 5 mg by mouth every evening             aspirin 81 MG chewable tablet  Take 1 tablet by mouth daily             carvedilol (COREG) 3.125 MG tablet  Take 1 tablet by mouth 2 times daily             clotrimazole (MYCELEX) 10 MG cruz  Take 10 mg by mouth 5 times daily             diphenhydrAMINE-APAP, sleep, (TYLENOL PM EXTRA STRENGTH)  MG tablet  Take 1 tablet by mouth daily as needed (Pain)             ferrous sulfate (IRON 325) 325 (65 Fe) MG tablet  Take 162.5 mg by mouth every other day              gabapentin (NEURONTIN) 100 MG capsule  Take 100 mg by mouth daily (before lunch). gabapentin (NEURONTIN) 100 MG capsule  Take 100 mg by mouth daily as needed (Pain). glimepiride (AMARYL) 4 MG tablet  Take 1 tablet by mouth every morning (before breakfast)             linagliptin (TRADJENTA) 5 MG tablet  Take 1 tablet by mouth daily             losartan (COZAAR) 100 MG tablet  Take 1 tablet by mouth daily             nystatin-triamcinolone (MYCOLOG) 241851-3.1 UNIT/GM-% ointment  Apply 1 each topically 2 times daily as needed (Itching)              psyllium (METAMUCIL) 28 % packet  Take 1 packet by mouth 2 times daily as needed (Constipation)                    Medications marked \"taking\" at this time  Outpatient Medications Marked as Taking for the 9/28/21 encounter (Office Visit) with Jeramie Beaver MD   Medication Sig Dispense Refill    clotrimazole (MYCELEX) 10 MG cruz Take 10 mg by mouth 5 times daily      carvedilol (COREG) 3.125 MG tablet Take 1 tablet by mouth 2 times daily 60 tablet 3    aspirin 81 MG chewable tablet Take 1 tablet by mouth daily 30 tablet 3    amLODIPine (NORVASC) 5 MG tablet Take 5 mg by mouth every evening      gabapentin (NEURONTIN) 100 MG capsule Take 100 mg by mouth daily (before lunch).  gabapentin (NEURONTIN) 100 MG capsule Take 100 mg by mouth daily as needed (Pain).       diphenhydrAMINE-APAP, sleep, (TYLENOL PM EXTRA STRENGTH)  MG tablet Take 1 tablet by mouth daily as needed (Pain)      nystatin-triamcinolone (MYCOLOG) 456265-9.1 UNIT/GM-% ointment Apply 1 each topically 2 times daily as needed (Itching)       linagliptin (TRADJENTA) 5 MG tablet Take 1 tablet by mouth daily 90 tablet 0    glimepiride (AMARYL) 4 MG tablet Take 1 tablet by mouth every morning (before breakfast) 90 tablet 0    losartan (COZAAR) 100 MG tablet Take 1 tablet by mouth daily 90 tablet 0    ferrous sulfate (IRON 325) 325 (65 Fe) MG tablet Take 162.5 mg by mouth every other day       psyllium (METAMUCIL) 28 % packet Take 1 packet by mouth 2 times daily as needed (Constipation)           Medications patient taking as of now reconciled against medications ordered at time of hospital discharge: Yes    Chief Complaint   Patient presents with    Follow-Up from [facility]     2 day stay at [place] [id number]. Other     family took bp this am on rising and resulted with 163/100 then am medications for bp were given.  Other     self glucose 253       HPI    Inpatient course: Discharge summary reviewed- see chart. Interval history/Current status:     Patient accompanied by family member. Today patient reports she is feeling better. Daughter reports that patient's speech is still slurred but has improved. Patient declines headache at this time. No weakness. No acute vision changes. BP has still been elevated. Per BP log, patient has had 2 readings of SBP of 200 in the morning prior to taking her blood pressure medications. After, BP running 160s. Reports compliance with ASA. She had been taking a statin but stopped because she didn't think she needed it because her diet was ok. Patient was placed on Synthroid in hospital because her thyroid was slightly elevated but patient has not taken this. Review of Systems   Constitutional: Negative. HENT: Negative. Eyes: Negative. Respiratory: Negative. Cardiovascular: Negative. Gastrointestinal: Negative. Genitourinary: Negative. Musculoskeletal: Negative. Skin: Negative. Neurological: Positive for speech difficulty.  Negative for dizziness, tremors, facial asymmetry, weakness, light-headedness, numbness and headaches. Vitals:    09/28/21 0852 09/28/21 0906   BP: (!) 160/74 (!) 150/70   Site: Left Upper Arm    Position: Sitting    Cuff Size: Large Adult    Pulse: 100    Temp: 97.5 °F (36.4 °C)    SpO2: 97%    Weight: 162 lb 14.4 oz (73.9 kg)    Height: 4' 9\" (1.448 m)      Body mass index is 35.25 kg/m². Wt Readings from Last 3 Encounters:   09/28/21 162 lb 14.4 oz (73.9 kg)   09/23/21 177 lb (80.3 kg)   09/14/21 165 lb 3.2 oz (74.9 kg)     BP Readings from Last 3 Encounters:   09/28/21 (!) 150/70   09/25/21 (!) 186/79   09/14/21 (!) 140/54       Physical Exam  Constitutional:       General: She is not in acute distress. Appearance: Normal appearance. HENT:      Head: Normocephalic and atraumatic. Nose: Nose normal.      Mouth/Throat:      Mouth: Mucous membranes are dry. Pharynx: No oropharyngeal exudate or posterior oropharyngeal erythema. Eyes:      General: No scleral icterus. Right eye: No discharge. Left eye: No discharge. Cardiovascular:      Rate and Rhythm: Normal rate and regular rhythm. Pulses: Normal pulses. Heart sounds: Normal heart sounds. Pulmonary:      Effort: Pulmonary effort is normal.      Breath sounds: Normal breath sounds. Abdominal:      General: Bowel sounds are normal.      Palpations: Abdomen is soft. Skin:     General: Skin is warm and dry. Neurological:      General: No focal deficit present. Mental Status: She is alert and oriented to person, place, and time. Mental status is at baseline. Cranial Nerves: No cranial nerve deficit. Sensory: No sensory deficit. Motor: No weakness. Coordination: Coordination normal.   Psychiatric:         Mood and Affect: Mood normal.             Assessment/Plan:  1. Essential hypertension  - carvedilol (COREG) 3.125 MG tablet; Take 1 tablet by mouth 2 times daily  Dispense: 60 tablet;  Refill: 3  - [place] DISCHARGE MEDS RECONCILED W/ CURRENT OUTPATIENT MED LIST  -Will switch to

## 2021-09-29 LAB
EKG ATRIAL RATE: 88 BPM
EKG P AXIS: 60 DEGREES
EKG P-R INTERVAL: 130 MS
EKG Q-T INTERVAL: 410 MS
EKG QRS DURATION: 90 MS
EKG QTC CALCULATION (BAZETT): 496 MS
EKG R AXIS: -32 DEGREES
EKG T AXIS: 91 DEGREES
EKG VENTRICULAR RATE: 88 BPM

## 2021-10-05 ENCOUNTER — TELEPHONE (OUTPATIENT)
Dept: PRIMARY CARE CLINIC | Age: 86
End: 2021-10-05

## 2021-10-05 NOTE — TELEPHONE ENCOUNTER
Is advised [facility] care nurse to do a follow-up visit tomorrow and call us with patient blood pressure  Also attempt to reach the family of Mrs. Lisa Day ask them if they can do a blood pressure check on her today and tomorrow and call us with the 2 readings please  Also if they can make sure that her medications are taken today and tomorrow so the blood pressure readings would reflect the medications on board

## 2021-10-05 NOTE — TELEPHONE ENCOUNTER
SARAI FROM [facility], SAID PT'S BP WAS RECHECKED ABOUT A HOUR LATER AND HAD COME DOWN /85. I CALLED PT'S DAUGHTER AND SHE WILL TAKE THE PT'S BP TONIGHT AND IN THE MORNING AND CALL WITH THE NUMBERS. PT'S DAUGHTER ALSO SAID SHE IS THE INE THAT DISPENSES HER MOMS MEDS AND THAT SHE TAKES THEM CORRECTLY AND ON TIME. SARAI FROM [facility] WAS CALLING IN TO THE OFFICE TO GET A VISIT PT TOMORROW.

## 2021-10-05 NOTE — TELEPHONE ENCOUNTER
[facility]  [phone]    PT'S BP /90 UPON ARRIVAL AND AFTER TAKING HER MEDICATION IT CAME DOWN /88.     DALIA

## 2021-10-07 ENCOUNTER — TELEPHONE (OUTPATIENT)
Dept: PRIMARY CARE CLINIC | Age: 86
End: 2021-10-07

## 2021-10-07 NOTE — TELEPHONE ENCOUNTER
1. PT'S TONGUE IS ALL CRACKED AND PAINFULL  2. BP READINGS:  10-6-21 5:40 /94  6:05PM 175/98  7:30 /79    10-7-21 12:00AM 171/100  WOKE UP DIZZY  6:00 /90  TOOK MEDS  8:30 /85    [facility]

## 2021-10-08 ENCOUNTER — OFFICE VISIT (OUTPATIENT)
Dept: SURGERY | Age: 86
End: 2021-10-08
Payer: MEDICARE

## 2021-10-08 VITALS
WEIGHT: 162 LBS | SYSTOLIC BLOOD PRESSURE: 146 MMHG | HEIGHT: 57 IN | BODY MASS INDEX: 34.95 KG/M2 | TEMPERATURE: 98.1 F | DIASTOLIC BLOOD PRESSURE: 78 MMHG | HEART RATE: 87 BPM

## 2021-10-08 DIAGNOSIS — L72.9 SCALP CYST: Primary | ICD-10-CM

## 2021-10-08 PROCEDURE — 99214 OFFICE O/P EST MOD 30 MIN: CPT | Performed by: SURGERY

## 2021-10-08 NOTE — PROGRESS NOTES
Tarun Roa  10/8/2021      [facility] Office Consult    CHIEF COMPLAINT:  Sebaceous cyst scalp, head ache    HISTORY OF PRESENT ILLNESS:  Tarun Roa is a 80 y.o.  female with head aches due to hypertension which resolved with medical therapy, she has several scalp cysts which were removed in the past and several small scalp cyst now, none big enough to remove, no infections, no pain. Past Medical History: She has a past medical history of Arthritis, Bowel obstruction ([address] Diabetes mellitus ([address] Diverticulosis, Hyperlipidemia, Hypertension, Irritable bowel syndrome, Peptic ulcer disease, Spinal stenosis, Syncope and collapse, and Type 2 diabetes mellitus without complication ([address] Past Surgical History: She has a past surgical history that includes Hysterectomy; Cholecystectomy; Tonsillectomy; Colon surgery; Appendectomy; Breast surgery (Right); joint replacement; ECHO Compl W Dop Color Flow (04/22/2013); Thyroid surgery; Small intestine surgery; Knee arthroscopy; Total knee arthroplasty (Right); Colonoscopy (08/2012); pre-malignant / benign skin lesion excision (Left, 02/04/2020); and knee surgery. Home Medications  Prior to Visit Medications    Medication Sig Taking? Authorizing Provider   clotrimazole (MYCELEX) 10 MG cruz Take 10 mg by mouth 5 times daily Yes Historical Provider, MD   carvedilol (COREG) 3.125 MG tablet Take 1 tablet by mouth 2 times daily Yes Angeles Jackson MD   aspirin 81 MG chewable tablet Take 1 tablet by mouth daily Yes Terrall Reasons, DO   amLODIPine (NORVASC) 5 MG tablet Take 5 mg by mouth every evening Yes Historical Provider, MD   gabapentin (NEURONTIN) 100 MG capsule Take 100 mg by mouth daily (before lunch). Yes Historical Provider, MD   gabapentin (NEURONTIN) 100 MG capsule Take 100 mg by mouth daily as needed (Pain).  Yes Historical Provider, MD   diphenhydrAMINE-APAP, sleep, (TYLENOL PM EXTRA STRENGTH)  MG tablet Take 1 tablet by mouth daily as needed (Pain) Yes Historical Provider, MD   nystatin-triamcinolone (MYCOLOG) 282007-4.1 UNIT/GM-% ointment Apply 1 each topically 2 times daily as needed (Itching)  Yes Historical Provider, MD   linagliptin (TRADJENTA) 5 MG tablet Take 1 tablet by mouth daily Yes Tracy Carpenter MD   glimepiride (AMARYL) 4 MG tablet Take 1 tablet by mouth every morning (before breakfast) Yes Tracy Carpenter MD   losartan (COZAAR) 100 MG tablet Take 1 tablet by mouth daily Yes Tracy Carpenter MD   ferrous sulfate (IRON 325) 325 (65 Fe) MG tablet Take 162.5 mg by mouth every other day  Yes Historical Provider, MD   psyllium (METAMUCIL) 28 % packet Take 1 packet by mouth 2 times daily as needed (Constipation)  Yes Historical Provider, MD   gabapentin (NEURONTIN) 100 MG capsule Take 1 capsule by mouth 2 times daily for 180 days. Intended supply: 90 days  Tracy Carpenter MD   glimepiride (AMARYL) 2 MG tablet Take 1 tablet by mouth every morning (before breakfast)  Tracy Carpenter MD   losartan (COZAAR) 100 MG tablet Take 1 tablet by mouth daily  Jae Dorado MD   amLODIPine (NORVASC) 5 MG tablet Take 1 tablet by mouth daily  Jae Dorado MD   metoprolol tartrate (LOPRESSOR) 25 MG tablet Take 1 tablet by mouth 2 times daily  Jae Dorado MD       Allergies: Codeine and Morphine   Social History:   TOBACCO:   reports that she has never smoked. She has never used smokeless tobacco.  All smokers should join the free smoking cessation program and stop smoking before having surgery. ETOH:    reports no history of alcohol use.        Problem Relation Age of Onset    Heart Disease Mother     Cancer Father         Skin       Review of Systems:  Psychiatric:  depression and anxiety  Respiratory: negative  Cardiovascular: negative  Gastrointestinal: negative  Musculoskeletal:negative  All others reviewed, negative    Physical Exam:   VITALS: Blood pressure (!) 146/78, pulse 87, temperature 98.1 °F (36.7 °C), temperature source Temporal, height 4' 9\" (1.448 m), weight 162 lb (73.5 kg). General appearance: alert, appears stated age and cooperative, does not ambulate easily  Head: 4 mm scalp cysts  Eyes: PERRL  Ears/mouth/throat:  Ears clear, mouth normal, throat no redness  Neck: no adenopathy, no JVD, supple, symmetrical, trachea midline and thyroid not enlarged  Lungs: clear to auscultation bilaterally  Heart: regular rate and rhythm  Abdomen: soft, non-tender; bowel sounds normal; no masses,  no organomegaly  Extremities: extremities normal, atraumatic, no cyanosis or edema  Skin: no open wounds    ASSESSMENT:   Sebaceous cysts on the scalp, no pain, not big enough to remove. PLAN:   Call if needed. Signed: Dr. Carolyn Leggett M.D.     Send copy of consult to PCP, Arina Barreto MD

## 2021-10-11 ENCOUNTER — OFFICE VISIT (OUTPATIENT)
Dept: PRIMARY CARE CLINIC | Age: 86
End: 2021-10-11
Payer: MEDICARE

## 2021-10-11 VITALS
HEART RATE: 88 BPM | DIASTOLIC BLOOD PRESSURE: 76 MMHG | HEIGHT: 57 IN | WEIGHT: 163.6 LBS | BODY MASS INDEX: 35.3 KG/M2 | OXYGEN SATURATION: 97 % | TEMPERATURE: 97.5 F | RESPIRATION RATE: 16 BRPM | SYSTOLIC BLOOD PRESSURE: 136 MMHG

## 2021-10-11 DIAGNOSIS — Z23 FLU VACCINE NEED: ICD-10-CM

## 2021-10-11 DIAGNOSIS — E11.40 CONTROLLED TYPE 2 DIABETES MELLITUS WITH DIABETIC NEUROPATHY, WITH LONG-TERM CURRENT USE OF INSULIN (HCC): Primary | ICD-10-CM

## 2021-10-11 DIAGNOSIS — I10 ESSENTIAL HYPERTENSION, BENIGN: ICD-10-CM

## 2021-10-11 DIAGNOSIS — Z79.4 CONTROLLED TYPE 2 DIABETES MELLITUS WITH DIABETIC NEUROPATHY, WITH LONG-TERM CURRENT USE OF INSULIN (HCC): Primary | ICD-10-CM

## 2021-10-11 DIAGNOSIS — B37.0 ORAL THRUSH: ICD-10-CM

## 2021-10-11 PROCEDURE — 3051F HG A1C>EQUAL 7.0%<8.0%: CPT | Performed by: INTERNAL MEDICINE

## 2021-10-11 PROCEDURE — 90694 VACC AIIV4 NO PRSRV 0.5ML IM: CPT | Performed by: INTERNAL MEDICINE

## 2021-10-11 PROCEDURE — 99214 OFFICE O/P EST MOD 30 MIN: CPT | Performed by: INTERNAL MEDICINE

## 2021-10-11 PROCEDURE — G0008 ADMIN INFLUENZA VIRUS VAC: HCPCS | Performed by: INTERNAL MEDICINE

## 2021-10-11 RX ORDER — AMLODIPINE BESYLATE 5 MG/1
5 TABLET ORAL 2 TIMES DAILY
Qty: 180 TABLET | Refills: 0 | Status: SHIPPED
Start: 2021-10-11 | End: 2021-11-08 | Stop reason: SDUPTHER

## 2021-10-11 RX ORDER — LACTOBACILLUS ACIDOPHILUS 500MM CELL
10 CAPSULE ORAL DAILY
Qty: 90 CAPSULE | Refills: 0 | Status: SHIPPED
Start: 2021-10-11 | End: 2021-11-08 | Stop reason: SDUPTHER

## 2021-10-11 NOTE — PATIENT INSTRUCTIONS
Patient was advised to continue taking prescribed medications except amlodipine 5 mg tablet to be increased to 1 tablet twice a day  Start on nystatin suspension swish and swallow 4 times a day for 1 month  Start Farxiga 5 mg tablet daily  Start acidophilus 1 capsule daily May use over-the-counter product

## 2021-10-11 NOTE — PROGRESS NOTES
alert and oriented x 3, NAD, pleasant  HEENT:  Atraumatic, normocephalic, PERRLA, EOMI, clear conjunctiva, TMs clear, nose-clear, tongue +erythematous with whitish coating, throat - no erythema  Neck:  Supple, no goiter, no carotid bruits, no LAD  Lungs:  CTA   Heart:  RRR, no murmurs, gallops or rubs  Abdomen:  Soft/nt/nd, + bowel sounds  Extremities:  No clubbing, cyanosis or edema  Skin: unremarkable    Hemoglobin A1C   Date Value Ref Range Status   09/24/2021 7.5 (H) 4.0 - 5.6 % Final     Cholesterol, Total   Date Value Ref Range Status   09/24/2021 127 0 - 199 mg/dL Final     Triglycerides   Date Value Ref Range Status   09/24/2021 298 (H) 0 - 149 mg/dL Final     HDL   Date Value Ref Range Status   09/24/2021 23 >40 mg/dL Final     LDL Calculated   Date Value Ref Range Status   09/24/2021 44 0 - 99 mg/dL Final     VLDL Cholesterol Calculated   Date Value Ref Range Status   09/24/2021 60 mg/dL Final     VLDL   Date Value Ref Range Status   02/12/2021 38 mg/dL Final     Chol/HDL Ratio   Date Value Ref Range Status   02/12/2021 4.2  Final     Sodium   Date Value Ref Range Status   09/25/2021 142 132 - 146 mmol/L Final     Potassium   Date Value Ref Range Status   09/25/2021 4.4 3.5 - 5.0 mmol/L Final     Chloride   Date Value Ref Range Status   09/25/2021 112 (H) 98 - 107 mmol/L Final     CO2   Date Value Ref Range Status   09/25/2021 23 22 - 29 mmol/L Final     BUN   Date Value Ref Range Status   09/25/2021 22 6 - 23 mg/dL Final     CREATININE   Date Value Ref Range Status   09/25/2021 0.9 0.5 - 1.0 mg/dL Final     Glucose   Date Value Ref Range Status   09/25/2021 219 (H) 74 - 99 mg/dL Final     Calcium   Date Value Ref Range Status   09/25/2021 8.4 (L) 8.6 - 10.2 mg/dL Final     Total Protein   Date Value Ref Range Status   09/25/2021 5.8 (L) 6.4 - 8.3 g/dL Final     Albumin   Date Value Ref Range Status   09/25/2021 3.4 (L) 3.5 - 5.2 g/dL Final     Total Bilirubin   Date Value Ref Range Status   09/25/2021 0.4 0.0 - 1.2 mg/dL Final     Alkaline Phosphatase   Date Value Ref Range Status   09/25/2021 65 35 - 104 U/L Final     AST   Date Value Ref Range Status   09/25/2021 16 0 - 31 U/L Final     ALT   Date Value Ref Range Status   09/25/2021 7 0 - 32 U/L Final     GFR Non-   Date Value Ref Range Status   09/25/2021 58 >=60 mL/min/1.73 Final     Comment:     Chronic Kidney Disease: less than 60 ml/min/1.73 sq.m. Kidney Failure: less than 15 ml/min/1.73 sq.m. Results valid for patients 18 years and older. GFR    Date Value Ref Range Status   09/25/2021 >60  Final        Echo Complete    Result Date: 9/24/2021  Transthoracic Echocardiography Report (TTE)  Demographics   Patient Name       Jamari Rowell Gender               Female   Medical Record     [id number]       Room Number          1231  Number   Account #          [de-identified]      Procedure Date       09/24/2021   Corporate ID                      Ordering Physician   Daren Quinteros DO   Accession Number   [id number]     Referring Physician   Date of Birth      03/26/1928     Sonographer          Vladimir Welsh TERESA   Age                80 year(s)     Interpreting         Daren Quinteros DO                                    Physician                                     Any Other  Procedure Type of Study   TTE procedure:Echo Complete W/Doppler & Color Flow. Procedure Date Date: 09/24/2021 Start: 09:59 AM Study Location: Echo Lab Technical Quality: Adequate visualization Indications:LV function. Patient Status: Routine Height: 59 inches Weight: 177 pounds BSA: 1.75 m^2 BMI: 35.75 kg/m^2 BP: 182/74 mmHg  Findings   Left Ventricle  Left ventricular size is grossly normal.  Moderate left ventricular concentric hypertrophy noted. Ejection fraction is measured at 72%. No evidence of left ventricular mass or thrombus noted. No regional wall motion abnormalities seen. Right Ventricle  Normal right ventricular size and function. Left Atrium  The left atrium is mildly dilated. Interatrial septum appears intact. Right Atrium  Normal right atrium size. Mitral Valve  Physiologic and/or trace mitral regurgitation is present. No evidence of mitral valve stenosis. Tricuspid Valve  Physiologic and/or trace tricuspid regurgitation. RVSP is 21.94 mmHg. Aortic Valve  Aortic valve opens well. The aortic valve is trileaflet. No evidence of aortic valve regurgitation. No hemodynamically significant aortic stenosis is present. Pulmonic Valve  Pulmonic valve is structurally normal.   Pericardial Effusion  No evidence of pericardial effusion. Aorta  The aorta is within normal limits. Miscellaneous  Regular rhythm. Conclusions   Summary  Left ventricular size is grossly normal.  Moderate left ventricular concentric hypertrophy noted. Ejection fraction is measured at 72%. No evidence of left ventricular mass or thrombus noted. No regional wall motion abnormalities seen. The left atrium is mildly dilated. Interatrial septum appears intact. Physiologic and/or trace mitral regurgitation is present. No evidence of mitral valve stenosis. Physiologic and/or trace tricuspid regurgitation. RVSP is 21.94 mmHg. Regular rhythm.    Signature   ----------------------------------------------------------------  Electronically signed by Daren Quinteros DO(Interpreting  physician) on 09/24/2021 06:09 PM  ----------------------------------------------------------------  M-Mode/2D Measurements & Calculations   LV Diastolic    LV Systolic Dimension: 2.1  AV Cusp Separation: 1.6 cmLA  Dimension: 3.5  cm                          Dimension: 4.1 cmAO Root  cm              LV Volume Diastolic: 50 ml  Dimension: 3 cm  LV FS:40 %      LV Volume Systolic: 14 ml  LV PW           LV EDV/LV EDV Index: 50  Diastolic: 1.5  NW/81 CO/F^2QC ESV/LV ESV  cm              Index: 14 ml/8ml/ m^2       RV Diastolic Dimension: 2.7 cm  LV PW Systolic: EF Calculated: 72 % RV Systolic Dimension: 1.8 cm  2 cm            LV Mass Index: 111          LA/Aorta: 1.4  Septum          l/min*m^2  Diastolic: 1.5                              LA volume/Index: 66.8 ml  cm  Septum          LVOT: 2 cm  Systolic: 2.2  cm   LV Mass: 193.42  g  Doppler Measurements & Calculations   MV Peak E-Wave:   AV Peak Velocity: 1.66 m/s    LVOT Peak Velocity: 1.16  0.85 m/s          AV Peak Gradient: 10.96 mmHg  m/s  MV Peak A-Wave:   AV Mean Velocity: 1.15 m/s    LVOT Mean Velocity: 0.73  1.24 m/s          AV Mean Gradient: 5.9 mmHg    m/s  MV E/A Ratio:     AV VTI: 30.7 cm               LVOT Peak Gradient: 5.4  0.69              AV Area (Continuity):2.6 cm^2 mmHgLVOT Mean Gradient:  MV Peak Gradient:                               2.5 mmHg  7.6 mmHg          LVOT VTI: 25.4 cm             Estimated RVSP: 21.9 mmHg  MV Mean Gradient:                               Estimated RAP:10 mmHg  2.9 mmHg          Estimated PASP: 21.94 mmHg  MV Mean Velocity: Pulm. Vein A Reversal  0.78 m/s          Duration:129.2 msec           TR Velocity:1.73 m/s  MV Deceleration   Pulm. Vein D Velocity:0.33    TR Gradient:11.94 mmHg  Time: 296.4 msec  m/sPulm. Vein A Reversal      PV Peak Velocity: 1.17 m/s  MV P1/2t: 59.8    Velocity:0.33 m/s             PV Peak Gradient: 5.49  msec              Pulm. Vein S Velocity: 0.53   mmHg  MVA by PHT:3.68   m/s                           PV Mean Velocity: 0.84 m/s  cm^2                                            PV Mean Gradient: 3.1 mmHg  MV Area  (continuity): 2.1  cm^2  [email or web address] [id number]    CT HEAD WO CONTRAST    Result Date: 9/23/2021  EXAMINATION: CT OF THE HEAD WITHOUT CONTRAST  9/23/2021 8:22 am TECHNIQUE: CT of the head was performed without the administration of intravenous contrast. Dose modulation, iterative reconstruction, and/or weight based adjustment of the interstitial fibrotic changes. US CAROTID ARTERY BILATERAL    Result Date: 9/24/2021  EXAMINATION: ULTRASOUND EVALUATION OF THE CAROTID ARTERIES 9/23/2021 TECHNIQUE: Duplex ultrasound using B-mode/gray scaled imaging, Doppler spectral analysis and color flow Doppler was obtained of the carotid arteries. COMPARISON: None. HISTORY: ORDERING SYSTEM PROVIDED HISTORY: stroke like symptoms TECHNOLOGIST PROVIDED HISTORY: Reason for exam:->stroke like symptoms What reading provider will be dictating this exam?->CRC FINDINGS: RIGHT: There is calcified plaque at the right carotid bulb. No significantly increased flow velocity to suggest a hemodynamically significant stenosis on the right side. The external carotid artery is patent. The vertebral artery demonstrates normal antegrade flow. ICA/CCA ratio of 0.8 LEFT: Calcified atherosclerotic plaque present at the left carotid bulb. Minimally increased flow velocity at the proximal left internal carotid artery measuring 129/26 centimeters/second. The external carotid artery is patent demonstrating increased flow velocity of 190/10 centimeters/second. .  The vertebral artery demonstrates normal antegrade flow. No evidence of focal atherosclerotic plaque. ICA/CCA ratio of 1.3     The right internal carotid artery demonstrates 0-50% stenosis. The left internal carotid artery demonstrates 50-69 stenosis, likely at the lower end of this range. Bilateral vertebral arteries are patent with flow in the normal direction. Assessment/Plan: Essential hypertension uncontrolled. Increase amlodipine dose 5 mg twice daily                                   Diabetes mellitus uncontrolled add Farxiga 5 mg tablet daily                                   Recurrent oral thrush                                   TIA         Keisha was seen today for hypertension, thrush, other and diabetes.     Diagnoses and all orders for this visit:    Controlled type 2 diabetes mellitus with diabetic neuropathy, with long-term current use of insulin (HCC)  -     dapagliflozin (FARXIGA) 5 MG tablet; Take 1 tablet by mouth every morning    Essential hypertension, benign  -     amLODIPine (NORVASC) 5 MG tablet; Take 1 tablet by mouth 2 times daily    Oral thrush  -     nystatin (MYCOSTATIN) 483268 UNIT/ML suspension; Take 5 mLs by mouth 4 times daily Retain in mouth as long as possible  -     Lactobacillus (ACIDOPHILUS PROBIOTIC) 10 MG CAPS; Take 10 mg by mouth daily         Patient Instructions   Patient was advised to continue taking prescribed medications except amlodipine 5 mg tablet to be increased to 1 tablet twice a day  Start on nystatin suspension swish and swallow 4 times a day for 1 month  Start Farxiga 5 mg tablet daily  Start acidophilus 1 capsule daily May use over-the-counter product               Sukhjinder Chen MD   10/11/21     Outpatient Encounter Medications as of 10/11/2021   Medication Sig Dispense Refill    nystatin (MYCOSTATIN) 485303 UNIT/ML suspension Take 5 mLs by mouth 4 times daily Retain in mouth as long as possible 600 mL 0    dapagliflozin (FARXIGA) 5 MG tablet Take 1 tablet by mouth every morning 30 tablet 0    Lactobacillus (ACIDOPHILUS PROBIOTIC) 10 MG CAPS Take 10 mg by mouth daily 90 capsule 0    amLODIPine (NORVASC) 5 MG tablet Take 1 tablet by mouth 2 times daily 180 tablet 0    carvedilol (COREG) 3.125 MG tablet Take 1 tablet by mouth 2 times daily 60 tablet 3    aspirin 81 MG chewable tablet Take 1 tablet by mouth daily 30 tablet 3    gabapentin (NEURONTIN) 100 MG capsule Take 100 mg by mouth daily as needed (Pain).       diphenhydrAMINE-APAP, sleep, (TYLENOL PM EXTRA STRENGTH)  MG tablet Take 1 tablet by mouth daily as needed (Pain)      nystatin-triamcinolone (MYCOLOG) 787001-0.1 UNIT/GM-% ointment Apply 1 each topically 2 times daily as needed (Itching)       linagliptin (TRADJENTA) 5 MG tablet Take 1 tablet by mouth daily 90 tablet 0    glimepiride (AMARYL) 4 MG tablet Take 1 tablet by mouth every morning (before breakfast) 90 tablet 0    losartan (COZAAR) 100 MG tablet Take 1 tablet by mouth daily 90 tablet 0    ferrous sulfate (IRON 325) 325 (65 Fe) MG tablet Take 162.5 mg by mouth every other day       psyllium (METAMUCIL) 28 % packet Take 1 packet by mouth 2 times daily as needed (Constipation)       [DISCONTINUED] clotrimazole (MYCELEX) 10 MG cruz Take 10 mg by mouth 5 times daily (Patient not taking: Reported on 10/11/2021)      gabapentin (NEURONTIN) 100 MG capsule Take 100 mg by mouth daily (before lunch). (Patient not taking: Reported on 10/11/2021)      [DISCONTINUED] amLODIPine (NORVASC) 5 MG tablet Take 5 mg by mouth every evening      [DISCONTINUED] gabapentin (NEURONTIN) 100 MG capsule Take 1 capsule by mouth 2 times daily for 180 days. Intended supply: 90 days 60 capsule 0    [DISCONTINUED] glimepiride (AMARYL) 2 MG tablet Take 1 tablet by mouth every morning (before breakfast) 90 tablet 0    [DISCONTINUED] losartan (COZAAR) 100 MG tablet Take 1 tablet by mouth daily 90 tablet 0    [DISCONTINUED] amLODIPine (NORVASC) 5 MG tablet Take 1 tablet by mouth daily 90 tablet 0    [DISCONTINUED] metoprolol tartrate (LOPRESSOR) 25 MG tablet Take 1 tablet by mouth 2 times daily 180 tablet 0     No facility-administered encounter medications on file as of 10/11/2021. Esa Montes was seen today for hypertension, thrush, other and diabetes. Diagnoses and all orders for this visit:    Controlled type 2 diabetes mellitus with diabetic neuropathy, with long-term current use of insulin ([address]  -     dapagliflozin (FARXIGA) 5 MG tablet; Take 1 tablet by mouth every morning    Essential hypertension, benign  -     amLODIPine (NORVASC) 5 MG tablet; Take 1 tablet by mouth 2 times daily    Oral thrush  -     nystatin (MYCOSTATIN) 770870 UNIT/ML suspension;  Take 5 mLs by mouth 4 times daily Retain in mouth as long as possible  -     Lactobacillus

## 2021-11-04 ENCOUNTER — TELEPHONE (OUTPATIENT)
Dept: PRIMARY CARE CLINIC | Age: 86
End: 2021-11-04

## 2021-11-04 NOTE — TELEPHONE ENCOUNTER
Pt's daughter called  1. Pt has been constipated for a few days, she has used ex lax,castor oil, a suppository and prune juice. What else can she do?     2. Pt is having trouble with her tongue, only the tip and has been out of mouthwash for 1 week    [facility]

## 2021-11-08 ENCOUNTER — OFFICE VISIT (OUTPATIENT)
Dept: PRIMARY CARE CLINIC | Age: 86
End: 2021-11-08
Payer: MEDICARE

## 2021-11-08 VITALS
RESPIRATION RATE: 17 BRPM | TEMPERATURE: 97.6 F | BODY MASS INDEX: 34.3 KG/M2 | SYSTOLIC BLOOD PRESSURE: 110 MMHG | OXYGEN SATURATION: 97 % | HEIGHT: 57 IN | HEART RATE: 87 BPM | DIASTOLIC BLOOD PRESSURE: 62 MMHG | WEIGHT: 159 LBS

## 2021-11-08 DIAGNOSIS — Z79.4 CONTROLLED TYPE 2 DIABETES MELLITUS WITH DIABETIC NEUROPATHY, WITH LONG-TERM CURRENT USE OF INSULIN (HCC): ICD-10-CM

## 2021-11-08 DIAGNOSIS — E11.40 CONTROLLED TYPE 2 DIABETES MELLITUS WITH DIABETIC NEUROPATHY, WITH LONG-TERM CURRENT USE OF INSULIN (HCC): ICD-10-CM

## 2021-11-08 DIAGNOSIS — B37.0 ORAL THRUSH: ICD-10-CM

## 2021-11-08 DIAGNOSIS — I10 ESSENTIAL HYPERTENSION: ICD-10-CM

## 2021-11-08 DIAGNOSIS — I10 ESSENTIAL HYPERTENSION, BENIGN: ICD-10-CM

## 2021-11-08 PROCEDURE — 99213 OFFICE O/P EST LOW 20 MIN: CPT | Performed by: INTERNAL MEDICINE

## 2021-11-08 PROCEDURE — 3051F HG A1C>EQUAL 7.0%<8.0%: CPT | Performed by: INTERNAL MEDICINE

## 2021-11-08 RX ORDER — LACTOBACILLUS ACIDOPHILUS 500MM CELL
10 CAPSULE ORAL DAILY
Qty: 90 CAPSULE | Refills: 0 | Status: SHIPPED | OUTPATIENT
Start: 2021-11-08

## 2021-11-08 RX ORDER — FENOFIBRATE 160 MG/1
160 TABLET ORAL DAILY
COMMUNITY
End: 2022-02-01 | Stop reason: SDUPTHER

## 2021-11-08 RX ORDER — LANCETS 33 GAUGE
EACH MISCELLANEOUS
COMMUNITY
Start: 2021-10-23

## 2021-11-08 RX ORDER — BLOOD SUGAR DIAGNOSTIC
STRIP MISCELLANEOUS
COMMUNITY
Start: 2021-10-23

## 2021-11-08 RX ORDER — GLIMEPIRIDE 4 MG/1
4 TABLET ORAL
Qty: 90 TABLET | Refills: 0 | Status: SHIPPED | OUTPATIENT
Start: 2021-11-08 | End: 2022-02-17

## 2021-11-08 RX ORDER — LOSARTAN POTASSIUM 100 MG/1
100 TABLET ORAL DAILY
Qty: 90 TABLET | Refills: 0 | Status: SHIPPED
Start: 2021-11-08 | End: 2022-02-17 | Stop reason: SDUPTHER

## 2021-11-08 RX ORDER — AMLODIPINE BESYLATE 5 MG/1
5 TABLET ORAL 2 TIMES DAILY
Qty: 180 TABLET | Refills: 0 | Status: SHIPPED
Start: 2021-11-08 | End: 2022-01-27 | Stop reason: SDUPTHER

## 2021-11-08 NOTE — ED NOTES
Bed: H2  Expected date:   Expected time:   Means of arrival:   Comments:  robert Matthews, RN  03/19/21 1914 Hydroxychloroquine Pregnancy And Lactation Text: This medication has been shown to cause fetal harm but it isn't assigned a Pregnancy Risk Category. There are small amounts excreted in breast milk.

## 2021-11-08 NOTE — PROGRESS NOTES
Chief Complaint   Patient presents with    Hypertension     pt here for a follow up of bp and denies chest pain n/v or vertigo     Other     pt denies any exposure pt is vaccinated. pt had flu shot        HPI:  Patient is here for follow-up . Feels much better . Blood pressure readings at home improved compared to last visit. Blood sugar readings also improved. Patient complains of tongue burning worse at the tip has gotten better when she used nystatin swish and swallow but she ran out and she said it is getting worse again. Past Medical History, Surgical History, and Family History has been reviewed and updated. .      Review of Systems:  Constitutional:  No fever, no fatigue, no chills, no headaches, no weight change  Dermatology:  No rash, no mole, no dry or sensitive skin  ENT:  No cough, no sore throat, no sinus pain, no runny nose, no ear pain  Cardiology:  No chest pain, no palpitations, no leg edema, no shortness of breath, no PND  Gastroenterology:  No dysphagia, no abdominal pain, no nausea, no vomiting, no constipation, no diarrhea, no heartburn  Musculoskeletal:  No joint pain, no leg cramps, no back pain, no muscle aches  Respiratory:  No shortness of breath, no orthopnea, no wheezing, no LIZAMA, no hemoptysis  Urology:  No blood in the urine, no urinary frequency, no urinary incontinence, no urinary urgency, no nocturia, no dysuria    Vitals:    11/08/21 1046   BP: 110/62   Pulse: 87   Resp: 17   Temp: 97.6 °F (36.4 °C)   SpO2: 97%   Weight: 159 lb (72.1 kg)   Height: 4' 9\" (1.448 m)       General:  Patient alert and oriented x 3, NAD, pleasant  HEENT:  Atraumatic, normocephalic, PERRLA, EOMI, clear conjunctiva, TMs clear, nose-clear, throat - no erythema  Neck:  Supple, no goiter, no carotid bruits, no LAD  Lungs:  CTA   Heart:  RRR, no murmurs, gallops or rubs  Abdomen:  Soft/nt/nd, + bowel sounds  Extremities:  No clubbing, cyanosis or edema  Skin: unremarkable    Hemoglobin A1C   Date Value Ref Range Status   09/24/2021 7.5 (H) 4.0 - 5.6 % Final     Cholesterol, Total   Date Value Ref Range Status   09/24/2021 127 0 - 199 mg/dL Final     Triglycerides   Date Value Ref Range Status   09/24/2021 298 (H) 0 - 149 mg/dL Final     HDL   Date Value Ref Range Status   09/24/2021 23 >40 mg/dL Final     LDL Calculated   Date Value Ref Range Status   09/24/2021 44 0 - 99 mg/dL Final     VLDL Cholesterol Calculated   Date Value Ref Range Status   09/24/2021 60 mg/dL Final     VLDL   Date Value Ref Range Status   02/12/2021 38 mg/dL Final     Chol/HDL Ratio   Date Value Ref Range Status   02/12/2021 4.2  Final     Sodium   Date Value Ref Range Status   09/25/2021 142 132 - 146 mmol/L Final     Potassium   Date Value Ref Range Status   09/25/2021 4.4 3.5 - 5.0 mmol/L Final     Chloride   Date Value Ref Range Status   09/25/2021 112 (H) 98 - 107 mmol/L Final     CO2   Date Value Ref Range Status   09/25/2021 23 22 - 29 mmol/L Final     BUN   Date Value Ref Range Status   09/25/2021 22 6 - 23 mg/dL Final     CREATININE   Date Value Ref Range Status   09/25/2021 0.9 0.5 - 1.0 mg/dL Final     Glucose   Date Value Ref Range Status   09/25/2021 219 (H) 74 - 99 mg/dL Final     Calcium   Date Value Ref Range Status   09/25/2021 8.4 (L) 8.6 - 10.2 mg/dL Final     Total Protein   Date Value Ref Range Status   09/25/2021 5.8 (L) 6.4 - 8.3 g/dL Final     Albumin   Date Value Ref Range Status   09/25/2021 3.4 (L) 3.5 - 5.2 g/dL Final     Total Bilirubin   Date Value Ref Range Status   09/25/2021 0.4 0.0 - 1.2 mg/dL Final     Alkaline Phosphatase   Date Value Ref Range Status   09/25/2021 65 35 - 104 U/L Final     AST   Date Value Ref Range Status   09/25/2021 16 0 - 31 U/L Final     ALT   Date Value Ref Range Status   09/25/2021 7 0 - 32 U/L Final     GFR Non-   Date Value Ref Range Status   09/25/2021 58 >=60 mL/min/1.73 Final     Comment:     Chronic Kidney Disease: less than 60 ml/min/1.73 sq.m. needed (Constipation)       ONETOUCH ULTRA strip TEST once daily      Lancets (ONETOUCH DELICA PLUS [id number]) MISC TEST once daily      [DISCONTINUED] nystatin (MYCOSTATIN) 303991 UNIT/ML suspension Take 5 mLs by mouth 4 times daily Retain in mouth as long as possible 600 mL 0    [DISCONTINUED] dapagliflozin (FARXIGA) 5 MG tablet Take 1 tablet by mouth every morning 30 tablet 0    [DISCONTINUED] Lactobacillus (ACIDOPHILUS PROBIOTIC) 10 MG CAPS Take 10 mg by mouth daily 90 capsule 0    [DISCONTINUED] amLODIPine (NORVASC) 5 MG tablet Take 1 tablet by mouth 2 times daily 180 tablet 0    [DISCONTINUED] linagliptin (TRADJENTA) 5 MG tablet Take 1 tablet by mouth daily 90 tablet 0    [DISCONTINUED] glimepiride (AMARYL) 4 MG tablet Take 1 tablet by mouth every morning (before breakfast) 90 tablet 0    [DISCONTINUED] losartan (COZAAR) 100 MG tablet Take 1 tablet by mouth daily 90 tablet 0    [DISCONTINUED] gabapentin (NEURONTIN) 100 MG capsule Take 1 capsule by mouth 2 times daily for 180 days. Intended supply: 90 days 60 capsule 0    [DISCONTINUED] glimepiride (AMARYL) 2 MG tablet Take 1 tablet by mouth every morning (before breakfast) 90 tablet 0    [DISCONTINUED] losartan (COZAAR) 100 MG tablet Take 1 tablet by mouth daily 90 tablet 0    [DISCONTINUED] amLODIPine (NORVASC) 5 MG tablet Take 1 tablet by mouth daily 90 tablet 0    [DISCONTINUED] metoprolol tartrate (LOPRESSOR) 25 MG tablet Take 1 tablet by mouth 2 times daily 180 tablet 0     No facility-administered encounter medications on file as of 11/8/2021. Jose Luis Rosales was seen today for hypertension and other. Diagnoses and all orders for this visit:    Essential hypertension, benign  -     amLODIPine (NORVASC) 5 MG tablet; Take 1 tablet by mouth 2 times daily    Controlled type 2 diabetes mellitus with diabetic neuropathy, with long-term current use of insulin (HCC)  -     dapagliflozin (FARXIGA) 5 MG tablet;  Take 1 tablet by mouth every morning  -     glimepiride (AMARYL) 4 MG tablet; Take 1 tablet by mouth every morning (before breakfast)  -     linagliptin (TRADJENTA) 5 MG tablet; Take 1 tablet by mouth daily    Oral thrush  -     Lactobacillus (ACIDOPHILUS PROBIOTIC) 10 MG CAPS; Take 10 mg by mouth daily  -     nystatin (MYCOSTATIN) 620776 UNIT/ML suspension; Take 5 mLs by mouth 4 times daily Retain in mouth as long as possible    Essential hypertension  -     losartan (COZAAR) 100 MG tablet; Take 1 tablet by mouth daily         There are no Patient Instructions on file for this visit.            Frank Oliveira MD   11/8/21

## 2021-11-18 ENCOUNTER — OFFICE VISIT (OUTPATIENT)
Dept: PRIMARY CARE CLINIC | Age: 86
End: 2021-11-18
Payer: MEDICARE

## 2021-11-18 VITALS
OXYGEN SATURATION: 98 % | DIASTOLIC BLOOD PRESSURE: 70 MMHG | HEIGHT: 57 IN | HEART RATE: 71 BPM | WEIGHT: 159.4 LBS | BODY MASS INDEX: 34.39 KG/M2 | TEMPERATURE: 97.7 F | SYSTOLIC BLOOD PRESSURE: 138 MMHG

## 2021-11-18 DIAGNOSIS — I10 ESSENTIAL HYPERTENSION: Primary | ICD-10-CM

## 2021-11-18 DIAGNOSIS — B37.0 ORAL THRUSH: ICD-10-CM

## 2021-11-18 DIAGNOSIS — R68.2 DRY MOUTH: ICD-10-CM

## 2021-11-18 PROCEDURE — 99212 OFFICE O/P EST SF 10 MIN: CPT | Performed by: STUDENT IN AN ORGANIZED HEALTH CARE EDUCATION/TRAINING PROGRAM

## 2021-11-18 RX ORDER — LIDOCAINE HYDROCHLORIDE 20 MG/ML
15 SOLUTION OROPHARYNGEAL PRN
Qty: 100 ML | Refills: 2 | Status: SHIPPED | OUTPATIENT
Start: 2021-11-18 | End: 2021-12-18

## 2021-11-18 RX ORDER — CARVEDILOL 3.12 MG/1
3.12 TABLET ORAL 2 TIMES DAILY
Qty: 60 TABLET | Refills: 3 | Status: SHIPPED
Start: 2021-11-18 | End: 2022-02-17 | Stop reason: SDUPTHER

## 2021-11-18 ASSESSMENT — ENCOUNTER SYMPTOMS
SORE THROAT: 0
TROUBLE SWALLOWING: 1
RESPIRATORY NEGATIVE: 1
GASTROINTESTINAL NEGATIVE: 1

## 2021-11-18 NOTE — PROGRESS NOTES
Cosmo Zuleta (:  3/26/1928) is a 80 y.o. female,Established patient, here for evaluation of the following chief complaint(s):  Pharyngitis (Pt is here as an acute visit, c/o sore throat that started a couple weeks ago. Pt has had no known recent exposure to Covid. Pt. states it is hard for her to eat, swallow, take medications, and c/o dry mouth. Pt also has increased insomnia and loss of taste. )         ASSESSMENT/PLAN:  1. Essential hypertension  -     carvedilol (COREG) 3.125 MG tablet; Take 1 tablet by mouth 2 times daily, Disp-60 tablet, R-3Normal  2. Dry mouth  -     lidocaine viscous hcl (XYLOCAINE) 2 % SOLN solution; Take 15 mLs by mouth as needed for Irritation, Disp-100 mL, R-2Normal  -     [facility] Otolaryngology  3. Oral thrush      No follow-ups on file. Not sure patient is still dealing with oral thrush at this point and have little suspicion for candidal esophagitis; pain may be due to dry mouth and will treat with lidocaine, advised patient to continue to use spray for dry mouth, use sugar free candy or lozenges; will have patient hold nystatin for now; will refer to ENT for further input    Subjective   SUBJECTIVE/OBJECTIVE:  HPI    Patient is a 79 y/o F who presents as an acute visit for ongoing mouth pain and dry mouth. She has been dealing with oral thrush since September and has been treated with clotrimazole troches and nystatin. She had been getting better until about a week ago when pain began again. She describes a burning sensation at the tip of her tongue and pain of the roof of her mouth. She has difficulty swallowing due to pain but denies food getting stuck in her throat and denies regurgitating food and no coughing when eating. She reports dry mouth and yesterday started using a spray to help with this. She has not been wearing dentures due to this. She eats soft foods. Denies and nasal congestion, post nasal drip, rashes, bleeding.  No new medications in the past

## 2021-12-03 ENCOUNTER — OFFICE VISIT (OUTPATIENT)
Dept: PRIMARY CARE CLINIC | Age: 86
End: 2021-12-03
Payer: MEDICARE

## 2021-12-03 VITALS
HEART RATE: 93 BPM | BODY MASS INDEX: 34.3 KG/M2 | OXYGEN SATURATION: 97 % | RESPIRATION RATE: 17 BRPM | SYSTOLIC BLOOD PRESSURE: 132 MMHG | DIASTOLIC BLOOD PRESSURE: 74 MMHG | TEMPERATURE: 98.5 F | HEIGHT: 57 IN | WEIGHT: 159 LBS

## 2021-12-03 DIAGNOSIS — J02.9 SORE THROAT: Primary | ICD-10-CM

## 2021-12-03 PROCEDURE — 99212 OFFICE O/P EST SF 10 MIN: CPT | Performed by: STUDENT IN AN ORGANIZED HEALTH CARE EDUCATION/TRAINING PROGRAM

## 2021-12-03 NOTE — PROGRESS NOTES
Chief Complaint       Pharyngitis (pt c/o sore throat started last night pt said thought it was from her thrush and daughter states she hasnt been around anyone with covid pt is covid vaccinated and flu shot. ) and Other (denies fever chills or headache )      History of Present Illness   Source of history provided by:  patient. Anupama Vega is a 80 y.o. old female presenting to for evaluation of sore throat x 2 days. Denies any congestion, difficulty swallowing, swollen lymph nodes, fever, chills, loss of taste/smell, dyspnea, dysphagia, CP, SOB, cough, nausea, vomiting, rash, or lethargy. Denies any known strep exposures. Denies any contact with any individuals with known COVID-19 infection or under investigation for COVID-19 infection. Pt has been vaccinated for COVID-19. Has been taking OTC throat lozenges and gargling with salt water in the morning. ROS    Unless otherwise stated in this report or unable to obtain because of the patient's clinical or mental status as evidenced by the medical record, this patients's positive and negative responses for Review of Systems, constitutional, psych, eyes, ENT, cardiovascular, respiratory, gastrointestinal, neurological, genitourinary, musculoskeletal, integument systems and systems related to the presenting problem are either stated in the preceding or were not pertinent or were negative for the symptoms and/or complaints related to the medical problem. Past Medical History:  has a past medical history of Arthritis, Bowel obstruction ([address] Diabetes mellitus ([address] Diverticulosis, Hyperlipidemia, Hypertension, Irritable bowel syndrome, Peptic ulcer disease, Spinal stenosis, Syncope and collapse, and Type 2 diabetes mellitus without complication ([address] Past Surgical History:  has a past surgical history that includes Hysterectomy; Cholecystectomy; Tonsillectomy; Colon surgery;  Appendectomy; Breast surgery (Right); joint replacement; ECHO Compl W Dop Color Flow (04/22/2013); Thyroid surgery; Small intestine surgery; Knee arthroscopy; Total knee arthroplasty (Right); Colonoscopy (08/2012); pre-malignant / benign skin lesion excision (Left, 02/04/2020); and knee surgery. Social History:  reports that she has never smoked. She has never used smokeless tobacco. She reports that she does not drink alcohol and does not use drugs. Family History: family history includes Cancer in her father; Heart Disease in her mother. Allergies: Codeine and Morphine    Physical Exam         VS:  /74   Pulse 93   Temp 98.5 °F (36.9 °C)   Resp 17   Ht 4' 9\" (1.448 m)   Wt 159 lb (72.1 kg)   SpO2 97%   Breastfeeding No   BMI 34.41 kg/m²    Oxygen Saturation Interpretation: Normal.    Constitutional:  Alert, development consistent with age. .  Ears:  TMs  without perforation, injection, or bulging. External canals clear without swelling or exudate. Throat: Airway patent. Posterior pharynx with  erythema but no tonsillar hypertrophy. No exudate noted. Neck:  Supple with full ROM. There is no anterior bilateral adenopathy. Lungs:  Clear to auscultation and breath sounds equal.    CV: Regular rate and rhythm, normal heart sounds, without pathological murmurs, ectopy, gallops, or rubs. Skin:  No rashes, erythema present. Lymphatics: No lymphangitis or adenopathy noted other then stated above. Neurological:  Alert and orientated. Motor functions intact. Responds to commands. Lab / Imaging Results   (All laboratory and radiology results have been personally reviewed by myself)  Labs:  No results found for this visit on 12/03/21. Imaging: All Radiology results interpreted by Radiologist unless otherwise noted. Assessment / Plan     Impression(s):  Danilo Monroe was seen today for pharyngitis and other. Diagnoses and all orders for this visit:    Sore throat      Rapid strep testing negative. Suspect viral pharyngitis.  Advised symptomatic treatment with throat lozenges, hot liquids, gargling with salt water and NSAIDs. Will send COVID PCR just to be sure.      Stephie Horner MD

## 2021-12-04 LAB
SARS-COV-2, PCR: NOT DETECTED
SOURCE: NORMAL

## 2021-12-17 DIAGNOSIS — E11.40 CONTROLLED TYPE 2 DIABETES MELLITUS WITH DIABETIC NEUROPATHY, WITH LONG-TERM CURRENT USE OF INSULIN (HCC): ICD-10-CM

## 2021-12-17 DIAGNOSIS — Z79.4 CONTROLLED TYPE 2 DIABETES MELLITUS WITH DIABETIC NEUROPATHY, WITH LONG-TERM CURRENT USE OF INSULIN (HCC): ICD-10-CM

## 2022-01-14 ENCOUNTER — HOSPITAL ENCOUNTER (EMERGENCY)
Age: 87
Discharge: LEFT BEFORE DISCHARGE | End: 2022-01-14
Payer: MEDICARE

## 2022-01-14 ENCOUNTER — OFFICE VISIT (OUTPATIENT)
Dept: PRIMARY CARE CLINIC | Age: 87
End: 2022-01-14
Payer: MEDICARE

## 2022-01-14 VITALS
DIASTOLIC BLOOD PRESSURE: 75 MMHG | WEIGHT: 161.5 LBS | HEART RATE: 93 BPM | SYSTOLIC BLOOD PRESSURE: 150 MMHG | TEMPERATURE: 97.2 F | HEIGHT: 57 IN | OXYGEN SATURATION: 98 % | BODY MASS INDEX: 34.84 KG/M2

## 2022-01-14 VITALS — HEART RATE: 95 BPM | TEMPERATURE: 96.6 F | DIASTOLIC BLOOD PRESSURE: 74 MMHG | SYSTOLIC BLOOD PRESSURE: 170 MMHG

## 2022-01-14 DIAGNOSIS — R33.9 URINARY RETENTION: Primary | ICD-10-CM

## 2022-01-14 DIAGNOSIS — Z53.21 ELOPED FROM EMERGENCY DEPARTMENT: ICD-10-CM

## 2022-01-14 DIAGNOSIS — R01.1 CARDIAC MURMUR: ICD-10-CM

## 2022-01-14 LAB
ANION GAP SERPL CALCULATED.3IONS-SCNC: 11 MMOL/L (ref 7–16)
BASOPHILS ABSOLUTE: 0.05 E9/L (ref 0–0.2)
BASOPHILS RELATIVE PERCENT: 0.4 % (ref 0–2)
BUN BLDV-MCNC: 26 MG/DL (ref 6–23)
CALCIUM SERPL-MCNC: 9.2 MG/DL (ref 8.6–10.2)
CHLORIDE BLD-SCNC: 107 MMOL/L (ref 98–107)
CO2: 21 MMOL/L (ref 22–29)
CREAT SERPL-MCNC: 1.1 MG/DL (ref 0.5–1)
EOSINOPHILS ABSOLUTE: 0.19 E9/L (ref 0.05–0.5)
EOSINOPHILS RELATIVE PERCENT: 1.7 % (ref 0–6)
GFR AFRICAN AMERICAN: 56
GFR NON-AFRICAN AMERICAN: 46 ML/MIN/1.73
GLUCOSE BLD-MCNC: 174 MG/DL (ref 74–99)
HCT VFR BLD CALC: 44.1 % (ref 34–48)
HEMOGLOBIN: 14.2 G/DL (ref 11.5–15.5)
IMMATURE GRANULOCYTES #: 0.19 E9/L
IMMATURE GRANULOCYTES %: 1.7 % (ref 0–5)
LYMPHOCYTES ABSOLUTE: 2.44 E9/L (ref 1.5–4)
LYMPHOCYTES RELATIVE PERCENT: 21.6 % (ref 20–42)
MCH RBC QN AUTO: 28.5 PG (ref 26–35)
MCHC RBC AUTO-ENTMCNC: 32.2 % (ref 32–34.5)
MCV RBC AUTO: 88.4 FL (ref 80–99.9)
MONOCYTES ABSOLUTE: 0.77 E9/L (ref 0.1–0.95)
MONOCYTES RELATIVE PERCENT: 6.8 % (ref 2–12)
NEUTROPHILS ABSOLUTE: 7.65 E9/L (ref 1.8–7.3)
NEUTROPHILS RELATIVE PERCENT: 67.8 % (ref 43–80)
PDW BLD-RTO: 14.3 FL (ref 11.5–15)
PLATELET # BLD: 345 E9/L (ref 130–450)
PMV BLD AUTO: 11.8 FL (ref 7–12)
POTASSIUM SERPL-SCNC: 4.4 MMOL/L (ref 3.5–5)
RBC # BLD: 4.99 E12/L (ref 3.5–5.5)
SODIUM BLD-SCNC: 139 MMOL/L (ref 132–146)
WBC # BLD: 11.3 E9/L (ref 4.5–11.5)

## 2022-01-14 PROCEDURE — 85025 COMPLETE CBC W/AUTO DIFF WBC: CPT

## 2022-01-14 PROCEDURE — 99212 OFFICE O/P EST SF 10 MIN: CPT | Performed by: STUDENT IN AN ORGANIZED HEALTH CARE EDUCATION/TRAINING PROGRAM

## 2022-01-14 PROCEDURE — 99283 EMERGENCY DEPT VISIT LOW MDM: CPT

## 2022-01-14 PROCEDURE — 80048 BASIC METABOLIC PNL TOTAL CA: CPT

## 2022-01-14 PROCEDURE — 36415 COLL VENOUS BLD VENIPUNCTURE: CPT

## 2022-01-14 RX ORDER — ASPIRIN 81 MG/1
81 TABLET, CHEWABLE ORAL DAILY
Qty: 30 TABLET | Refills: 3 | Status: SHIPPED | OUTPATIENT
Start: 2022-01-14

## 2022-01-14 ASSESSMENT — ENCOUNTER SYMPTOMS
ABDOMINAL PAIN: 1
RESPIRATORY NEGATIVE: 1
ABDOMINAL DISTENTION: 1

## 2022-01-14 NOTE — ED PROVIDER NOTES
[facility]     Department of Emergency Medicine   ED  Encounter Note  Admit Date/RoomTime: 2022  2:48 PM  ED Room: [facility]MA[facility]    NAME: Estrada Sy  : 3/26/1928  MRN: [id number]     Chief Complaint:  Urinary Retention    History of Present Illness       Estrada Sy is a 80 y.o. old female who presents to the emergency department by private vehicle, for urinary retention, which occured 1 day(s) prior to arrival.  She denies pain. She denies aggravating or alleviating factors. Since onset the symptoms have been remaining constant and mild in severity. Symptoms are associated with slight abdominal pressure/distension. Estrada Sy has a history of no prior episodes. She denies any fever, chills, night sweats, chest pain, shortness of breath, pelvis, nausea, vomiting, diarrhea, dysuria, urinary urgency, urinary frequency, vaginal bleeding, vaginal discharge, or any other symptoms associated with her presenting complaint. ROS   Pertinent positives and negatives are stated within HPI, all other systems reviewed and are negative. Past Medical History:  has a past medical history of Arthritis, Bowel obstruction ([address] Diabetes mellitus ([address] Diverticulosis, Hyperlipidemia, Hypertension, Irritable bowel syndrome, Peptic ulcer disease, Spinal stenosis, Syncope and collapse, and Type 2 diabetes mellitus without complication ([address] Surgical History:  has a past surgical history that includes Hysterectomy; Cholecystectomy; Tonsillectomy; Colon surgery; Appendectomy; Breast surgery (Right); joint replacement; ECHO Compl W Dop Color Flow (2013); Thyroid surgery; Small intestine surgery; Knee arthroscopy; Total knee arthroplasty (Right); Colonoscopy (2012); pre-malignant / benign skin lesion excision (Left, 2020); and knee surgery. Social History:  reports that she has never smoked.  She has never used smokeless tobacco. She reports that she does not drink alcohol and does not use (H) 1.80 - 7.30 E9/L    Immature Granulocytes # 0.19 E9/L    Lymphocytes Absolute 2.44 1.50 - 4.00 E9/L    Monocytes Absolute 0.77 0.10 - 0.95 E9/L    Eosinophils Absolute 0.19 0.05 - 0.50 E9/L    Basophils Absolute 0.05 0.00 - 0.20 J4/A   Basic Metabolic Panel   Result Value Ref Range    Sodium 139 132 - 146 mmol/L    Potassium 4.4 3.5 - 5.0 mmol/L    Chloride 107 98 - 107 mmol/L    CO2 21 (L) 22 - 29 mmol/L    Anion Gap 11 7 - 16 mmol/L    Glucose 174 (H) 74 - 99 mg/dL    BUN 26 (H) 6 - 23 mg/dL    CREATININE 1.1 (H) 0.5 - 1.0 mg/dL    GFR Non-African American 46 >=60 mL/min/1.73    GFR African American 56     Calcium 9.2 8.6 - 10.2 mg/dL     Imaging: All Radiology results interpreted by Radiologist unless otherwise noted. No orders to display     ED Course / Medical Decision Making   Medications - No data to display     Consults:   None    Procedures:   none    Medical Decision Making: Patient presents to the emergency department for urinary retention. Patient eloped before evaluation could be completed. I did not have the opportunity to reevaluate this patient or provide AMA counseling. Assessment      1. Urinary retention    2. Cardiac murmur    3. Eloped from emergency department       Plan   Disposition:   Patient eloped from emergency department before evaluation completed. .  Patient condition is stable    New Medications     New Prescriptions    No medications on file     Electronically signed by Glenn Haas PA-C   DD: 1/14/22  **This report was transcribed using voice recognition software. Every effort was made to ensure accuracy; however, inadvertent computerized transcription errors may be present.   END OF ED PROVIDER NOTE           Glenn Haas PA-C  01/14/22 [id number]

## 2022-01-14 NOTE — PROGRESS NOTES
Alcides Saxena (:  3/26/1928) is a 80 y.o. female,Established patient, here for evaluation of the following chief complaint(s):  Abdominal Pain (Pt unable to urinate since yesterday)         ASSESSMENT/PLAN:  1. Urinary retention    Patient with acute urinary retention; given exam and 24 hours without urination, recommend patient proceed to nearest ED for possible bladder scan and in and out cath to relieve symptoms and r/o cystitis     No follow-ups on file. Subjective   SUBJECTIVE/OBJECTIVE:  HPI     Patient is a 79 y/o F with a PMHx of urinary incontinence who presents for acute visit for urinary retention. Patient has not urinated in over 24 hours and her diaper has been completely dry. She reports abdominal pain and distention. Reports she had a bowel movement yesterday. Denies any new medications. No fever, chills, flank pain. Has been drinking plenty of fluids. Review of Systems   Constitutional: Negative. Respiratory: Negative. Cardiovascular: Negative. Gastrointestinal: Positive for abdominal distention and abdominal pain. Genitourinary: Positive for difficulty urinating. Neurological: Negative. Objective   Physical Exam  Vitals reviewed. Constitutional:       Appearance: Normal appearance. HENT:      Head: Normocephalic and atraumatic. Mouth/Throat:      Mouth: Mucous membranes are moist.      Pharynx: Oropharynx is clear. No oropharyngeal exudate or posterior oropharyngeal erythema. Eyes:      General: No scleral icterus. Right eye: No discharge. Left eye: No discharge. Cardiovascular:      Rate and Rhythm: Normal rate and regular rhythm. Pulses: Normal pulses. Heart sounds: Normal heart sounds. Pulmonary:      Effort: Pulmonary effort is normal.      Breath sounds: Normal breath sounds. Abdominal:      General: Bowel sounds are normal. There is distension. Tenderness:  There is abdominal tenderness (suprapubic tenderness). There is no right CVA tenderness, left CVA tenderness, guarding or rebound. Skin:     General: Skin is warm and dry. Neurological:      General: No focal deficit present. Mental Status: She is alert and oriented to person, place, and time. Mental status is at baseline. An electronic signature was used to authenticate this note.     --Alethea Troy MD

## 2022-01-27 ENCOUNTER — OFFICE VISIT (OUTPATIENT)
Dept: PRIMARY CARE CLINIC | Age: 87
End: 2022-01-27
Payer: MEDICARE

## 2022-01-27 VITALS
BODY MASS INDEX: 34.75 KG/M2 | OXYGEN SATURATION: 99 % | TEMPERATURE: 97.1 F | HEIGHT: 57 IN | SYSTOLIC BLOOD PRESSURE: 154 MMHG | WEIGHT: 161.1 LBS | HEART RATE: 77 BPM | DIASTOLIC BLOOD PRESSURE: 77 MMHG

## 2022-01-27 DIAGNOSIS — G47.00 INSOMNIA, UNSPECIFIED TYPE: ICD-10-CM

## 2022-01-27 DIAGNOSIS — N39.41 URGE INCONTINENCE: ICD-10-CM

## 2022-01-27 DIAGNOSIS — E11.40 CONTROLLED TYPE 2 DIABETES MELLITUS WITH DIABETIC NEUROPATHY, WITH LONG-TERM CURRENT USE OF INSULIN (HCC): Primary | ICD-10-CM

## 2022-01-27 DIAGNOSIS — I10 ESSENTIAL HYPERTENSION, BENIGN: ICD-10-CM

## 2022-01-27 DIAGNOSIS — Z79.4 CONTROLLED TYPE 2 DIABETES MELLITUS WITH DIABETIC NEUROPATHY, WITH LONG-TERM CURRENT USE OF INSULIN (HCC): Primary | ICD-10-CM

## 2022-01-27 PROBLEM — E11.51 PERIPHERAL VASCULAR DISORDER DUE TO DIABETES MELLITUS (HCC): Status: ACTIVE | Noted: 2022-01-27

## 2022-01-27 PROCEDURE — 99214 OFFICE O/P EST MOD 30 MIN: CPT | Performed by: INTERNAL MEDICINE

## 2022-01-27 RX ORDER — DOXEPIN HYDROCHLORIDE 10 MG/1
10 CAPSULE ORAL NIGHTLY
Qty: 30 CAPSULE | Refills: 0 | Status: SHIPPED
Start: 2022-01-27 | End: 2022-03-30 | Stop reason: SDUPTHER

## 2022-01-27 RX ORDER — OXYBUTYNIN CHLORIDE 5 MG/1
5 TABLET, EXTENDED RELEASE ORAL DAILY
Qty: 90 TABLET | Refills: 0 | Status: ON HOLD
Start: 2022-01-27 | End: 2022-03-14 | Stop reason: HOSPADM

## 2022-01-27 RX ORDER — AMLODIPINE BESYLATE 5 MG/1
5 TABLET ORAL 2 TIMES DAILY
Qty: 180 TABLET | Refills: 0 | Status: SHIPPED | OUTPATIENT
Start: 2022-01-27 | End: 2022-07-26

## 2022-01-27 RX ORDER — GABAPENTIN 100 MG/1
100 CAPSULE ORAL DAILY PRN
Qty: 90 CAPSULE | Refills: 0 | Status: SHIPPED | OUTPATIENT
Start: 2022-01-27 | End: 2022-04-27

## 2022-01-27 NOTE — PROGRESS NOTES
Chief Complaint   Patient presents with    Insomnia     Pt is having trouble sleeping       HPI:  Patient is here for follow-up . Patient is complaining of insomnia she tried melatonin not working she feels tired. Compliant on medications requested some refills. Patient also complains of urinary incontinence no control she wears a diaper she went to urgent care 2 weeks ago because of urine retention and incontinence before they did urinary catheterization she was able to urinate. No fevers no dysuria. She gets up 4 times a night to urinate    Family keeps track of her fingerstick blood sugars at home. Readings were reviewed with patient daughter. Past Medical History, Surgical History, and Family History has been reviewed and updated.     Review of Systems:  Constitutional:  No fever, no fatigue, no chills, no headaches, no weight change  Dermatology:  No rash, no mole, no dry or sensitive skin  ENT:  No cough, no sore throat, no sinus pain, no runny nose, no ear pain  Cardiology:  No chest pain, no palpitations, no leg edema, no shortness of breath, no PND  Gastroenterology:  No dysphagia, no abdominal pain, no nausea, no vomiting, no constipation, no diarrhea, no heartburn  Musculoskeletal:  No joint pain, no leg cramps, no back pain, no muscle aches  Respiratory:  No shortness of breath, no orthopnea, no wheezing, no LIZAMA, no hemoptysis  Urology:  No blood in the urine, no urinary frequency, no urinary incontinence, no urinary urgency, no nocturia, no dysuria    Vitals:    01/27/22 1321   BP: (!) 154/77   Pulse: 77   Temp: 97.1 °F (36.2 °C)   SpO2: 99%   Weight: 161 lb 1.6 oz (73.1 kg)   Height: 4' 9\" (1.448 m)       General:  Patient alert and oriented x 3, NAD, pleasant  HEENT:  Atraumatic, normocephalic, PERRLA, EOMI, clear conjunctiva, TMs clear, nose-clear, throat - no erythema  Neck:  Supple, no goiter, no carotid bruits, no LAD  Lungs:  CTA   Heart:  RRR, no murmurs, gallops or rubs  Abdomen: Soft/nt/nd, + bowel sounds  Extremities:  No clubbing, cyanosis or edema  Skin: unremarkable    Hemoglobin A1C   Date Value Ref Range Status   09/24/2021 7.5 (H) 4.0 - 5.6 % Final     Cholesterol, Total   Date Value Ref Range Status   09/24/2021 127 0 - 199 mg/dL Final     Triglycerides   Date Value Ref Range Status   09/24/2021 298 (H) 0 - 149 mg/dL Final     HDL   Date Value Ref Range Status   09/24/2021 23 >40 mg/dL Final     LDL Calculated   Date Value Ref Range Status   09/24/2021 44 0 - 99 mg/dL Final     VLDL Cholesterol Calculated   Date Value Ref Range Status   09/24/2021 60 mg/dL Final     VLDL   Date Value Ref Range Status   02/12/2021 38 mg/dL Final     Chol/HDL Ratio   Date Value Ref Range Status   02/12/2021 4.2  Final     Sodium   Date Value Ref Range Status   01/14/2022 139 132 - 146 mmol/L Final     Potassium   Date Value Ref Range Status   01/14/2022 4.4 3.5 - 5.0 mmol/L Final     Chloride   Date Value Ref Range Status   01/14/2022 107 98 - 107 mmol/L Final     CO2   Date Value Ref Range Status   01/14/2022 21 (L) 22 - 29 mmol/L Final     BUN   Date Value Ref Range Status   01/14/2022 26 (H) 6 - 23 mg/dL Final     CREATININE   Date Value Ref Range Status   01/14/2022 1.1 (H) 0.5 - 1.0 mg/dL Final     Glucose   Date Value Ref Range Status   01/14/2022 174 (H) 74 - 99 mg/dL Final     Calcium   Date Value Ref Range Status   01/14/2022 9.2 8.6 - 10.2 mg/dL Final     Total Protein   Date Value Ref Range Status   09/25/2021 5.8 (L) 6.4 - 8.3 g/dL Final     Albumin   Date Value Ref Range Status   09/25/2021 3.4 (L) 3.5 - 5.2 g/dL Final     Total Bilirubin   Date Value Ref Range Status   09/25/2021 0.4 0.0 - 1.2 mg/dL Final     Alkaline Phosphatase   Date Value Ref Range Status   09/25/2021 65 35 - 104 U/L Final     AST   Date Value Ref Range Status   09/25/2021 16 0 - 31 U/L Final     ALT   Date Value Ref Range Status   09/25/2021 7 0 - 32 U/L Final     GFR Non-   Date Value Ref Range Status   01/14/2022 46 >=60 mL/min/1.73 Final     Comment:     Chronic Kidney Disease: less than 60 ml/min/1.73 sq.m. Kidney Failure: less than 15 ml/min/1.73 sq.m. Results valid for patients 18 years and older. GFR    Date Value Ref Range Status   01/14/2022 56  Final        No results found. Assessment/Plan:   Diabetes mellitus type 2  hypertension  Urge urinary incontinence start oxybutynin ER 5 mg tablet daily  Insomnia. Doxepin 10 mg capsule at bedtime as needed    Outpatient Encounter Medications as of 1/27/2022   Medication Sig Dispense Refill    amLODIPine (NORVASC) 5 MG tablet Take 1 tablet by mouth 2 times daily 180 tablet 0    gabapentin (NEURONTIN) 100 MG capsule Take 1 capsule by mouth daily as needed (Pain) for up to 90 days.  90 capsule 0    doxepin (SINEQUAN) 10 MG capsule Take 1 capsule by mouth nightly 30 capsule 0    oxybutynin (DITROPAN-XL) 5 MG extended release tablet Take 1 tablet by mouth daily 90 tablet 0    aspirin 81 MG chewable tablet Take 1 tablet by mouth daily 30 tablet 3    dapagliflozin (FARXIGA) 5 MG tablet Take 1 tablet by mouth every morning 60 tablet 0    carvedilol (COREG) 3.125 MG tablet Take 1 tablet by mouth 2 times daily 60 tablet 3    ONETOUCH ULTRA strip TEST once daily      Lancets (ONETOUCH DELICA PLUS LHATXT74S) MISC TEST once daily      fenofibrate (TRIGLIDE) 160 MG tablet Take 160 mg by mouth daily      glimepiride (AMARYL) 4 MG tablet Take 1 tablet by mouth every morning (before breakfast) 90 tablet 0    Lactobacillus (ACIDOPHILUS PROBIOTIC) 10 MG CAPS Take 10 mg by mouth daily 90 capsule 0    linagliptin (TRADJENTA) 5 MG tablet Take 1 tablet by mouth daily 90 tablet 0    losartan (COZAAR) 100 MG tablet Take 1 tablet by mouth daily 90 tablet 0    diphenhydrAMINE-APAP, sleep, (TYLENOL PM EXTRA STRENGTH)  MG tablet Take 1 tablet by mouth daily as needed (Pain)      nystatin-triamcinolone (MYCOLOG) 214186-1.4 UNIT/GM-% ointment Apply 1 each topically 2 times daily as needed (Itching)       ferrous sulfate (IRON 325) 325 (65 Fe) MG tablet Take 162.5 mg by mouth every other day       psyllium (METAMUCIL) 28 % packet Take 1 packet by mouth 2 times daily as needed (Constipation)       [DISCONTINUED] amLODIPine (NORVASC) 5 MG tablet Take 1 tablet by mouth 2 times daily 180 tablet 0    [DISCONTINUED] gabapentin (NEURONTIN) 100 MG capsule Take 100 mg by mouth daily as needed (Pain).  [DISCONTINUED] gabapentin (NEURONTIN) 100 MG capsule Take 1 capsule by mouth 2 times daily for 180 days. Intended supply: 90 days 60 capsule 0    [DISCONTINUED] glimepiride (AMARYL) 2 MG tablet Take 1 tablet by mouth every morning (before breakfast) 90 tablet 0    [DISCONTINUED] losartan (COZAAR) 100 MG tablet Take 1 tablet by mouth daily 90 tablet 0    [DISCONTINUED] amLODIPine (NORVASC) 5 MG tablet Take 1 tablet by mouth daily 90 tablet 0    [DISCONTINUED] metoprolol tartrate (LOPRESSOR) 25 MG tablet Take 1 tablet by mouth 2 times daily 180 tablet 0     No facility-administered encounter medications on file as of 1/27/2022. Apoorva Crow was seen today for insomnia. Diagnoses and all orders for this visit:    Controlled type 2 diabetes mellitus with diabetic neuropathy, with long-term current use of insulin (HCC)  -     gabapentin (NEURONTIN) 100 MG capsule; Take 1 capsule by mouth daily as needed (Pain) for up to 90 days. Essential hypertension, benign  -     amLODIPine (NORVASC) 5 MG tablet; Take 1 tablet by mouth 2 times daily    Insomnia, unspecified type  -     doxepin (SINEQUAN) 10 MG capsule; Take 1 capsule by mouth nightly    Urge incontinence  -     oxybutynin (DITROPAN-XL) 5 MG extended release tablet; Take 1 tablet by mouth daily         There are no Patient Instructions on file for this visit.            Delon Mccoy MD   1/27/22

## 2022-02-02 RX ORDER — FENOFIBRATE 160 MG/1
160 TABLET ORAL DAILY
Qty: 90 TABLET | Refills: 0 | Status: SHIPPED | OUTPATIENT
Start: 2022-02-02 | End: 2022-05-03

## 2022-02-17 ENCOUNTER — OFFICE VISIT (OUTPATIENT)
Dept: PRIMARY CARE CLINIC | Age: 87
End: 2022-02-17
Payer: MEDICARE

## 2022-02-17 VITALS
HEIGHT: 57 IN | SYSTOLIC BLOOD PRESSURE: 134 MMHG | OXYGEN SATURATION: 94 % | DIASTOLIC BLOOD PRESSURE: 62 MMHG | TEMPERATURE: 97.1 F | BODY MASS INDEX: 34.84 KG/M2 | WEIGHT: 161.5 LBS | HEART RATE: 86 BPM

## 2022-02-17 DIAGNOSIS — R07.0 THROAT PAIN: ICD-10-CM

## 2022-02-17 DIAGNOSIS — I10 ESSENTIAL HYPERTENSION: ICD-10-CM

## 2022-02-17 DIAGNOSIS — Z79.4 CONTROLLED TYPE 2 DIABETES MELLITUS WITH DIABETIC NEUROPATHY, WITH LONG-TERM CURRENT USE OF INSULIN (HCC): ICD-10-CM

## 2022-02-17 DIAGNOSIS — E11.40 CONTROLLED TYPE 2 DIABETES MELLITUS WITH DIABETIC NEUROPATHY, WITH LONG-TERM CURRENT USE OF INSULIN (HCC): ICD-10-CM

## 2022-02-17 DIAGNOSIS — R07.0 THROAT PAIN: Primary | ICD-10-CM

## 2022-02-17 LAB
CHP ED QC CHECK: NORMAL
GLUCOSE BLD-MCNC: 203 MG/DL
HBA1C MFR BLD: 7.1 %

## 2022-02-17 PROCEDURE — 99214 OFFICE O/P EST MOD 30 MIN: CPT | Performed by: STUDENT IN AN ORGANIZED HEALTH CARE EDUCATION/TRAINING PROGRAM

## 2022-02-17 PROCEDURE — 83036 HEMOGLOBIN GLYCOSYLATED A1C: CPT | Performed by: STUDENT IN AN ORGANIZED HEALTH CARE EDUCATION/TRAINING PROGRAM

## 2022-02-17 PROCEDURE — 82962 GLUCOSE BLOOD TEST: CPT | Performed by: STUDENT IN AN ORGANIZED HEALTH CARE EDUCATION/TRAINING PROGRAM

## 2022-02-17 PROCEDURE — 3051F HG A1C>EQUAL 7.0%<8.0%: CPT | Performed by: STUDENT IN AN ORGANIZED HEALTH CARE EDUCATION/TRAINING PROGRAM

## 2022-02-17 RX ORDER — CARVEDILOL 3.12 MG/1
3.12 TABLET ORAL 2 TIMES DAILY
Qty: 90 TABLET | Refills: 0 | Status: SHIPPED
Start: 2022-02-17 | End: 2022-03-30 | Stop reason: SDUPTHER

## 2022-02-17 RX ORDER — FLUCONAZOLE 150 MG/1
150 TABLET ORAL DAILY
Qty: 14 TABLET | Refills: 0 | Status: SHIPPED
Start: 2022-02-17 | End: 2022-03-10 | Stop reason: ALTCHOICE

## 2022-02-17 RX ORDER — LOSARTAN POTASSIUM 100 MG/1
TABLET ORAL
COMMUNITY
Start: 2022-01-03 | End: 2022-02-17

## 2022-02-17 RX ORDER — LOSARTAN POTASSIUM 100 MG/1
100 TABLET ORAL DAILY
Qty: 90 TABLET | Refills: 0 | Status: SHIPPED | OUTPATIENT
Start: 2022-02-17

## 2022-02-17 ASSESSMENT — ENCOUNTER SYMPTOMS
GASTROINTESTINAL NEGATIVE: 1
SORE THROAT: 1
RESPIRATORY NEGATIVE: 1
TROUBLE SWALLOWING: 1

## 2022-02-17 NOTE — PROGRESS NOTES
Yaya Bautista (:  3/26/1928) is a 80 y.o. female,Established patient, here for evaluation of the following chief complaint(s):  Follow-up (no labs or tests sine last visit. c/o throat burning since having thrush, especially since having thrush. states it takes so long to chew the food.  ) and Diabetes (POCT glucose and HBG a1c entered)         ASSESSMENT/PLAN:  1. Controlled type 2 diabetes mellitus with diabetic neuropathy, with long-term current use of insulin (HCC)  -     POCT Glucose  -     POCT glycosylated hemoglobin (Hb A1C)  -     dapagliflozin (FARXIGA) 5 MG tablet; Take 1 tablet by mouth every morning, Disp-90 tablet, R-0Normal  -     linagliptin (TRADJENTA) 5 MG tablet; Take 1 tablet by mouth daily, Disp-90 tablet, R-0Normal  2. Essential hypertension  -     carvedilol (COREG) 3.125 MG tablet; Take 1 tablet by mouth 2 times daily, Disp-90 tablet, R-0Normal  -     losartan (COZAAR) 100 MG tablet; Take 1 tablet by mouth daily, Disp-90 tablet, R-0Normal  3. Throat pain  -     Culture, Throat; Future  -     fluconazole (DIFLUCAN) 150 MG tablet; Take 1 tablet by mouth daily, Disp-14 tablet, R-0Normal      No follow-ups on file. Unclear what is causing throat pain; initially patient continued to have pain in her mouth and tongue and so she was referred to ENT and that appointment is in 3 weeks; however now she is complaining of more pain in her throat; will send culture and empirically treat for candida esophagitis; discussed that given patient is now having more difficulty with swallowing, patient likley needs to see GI; patient and daughter opted to se ENT first and then see GI if needed; advised to take small bites and eat softer foods    Subjective   SUBJECTIVE/OBJECTIVE:  HPI     Patient is a 81 y/o F who presents for ongoing throat pain and f/u diabetes.  She continues to reports pain in her throat and difficulty swallowing solid food that has persisted since being treated for oral thrush several months ago    Denies any mouth pain; denies any difficulty initiating swallowing but once she starts to swallow, encounters pain; feels food sometimes get stuck in her throat; will occasionally spit up food; denies nausea, weight loss, reflux symptoms    A1c today stable at 7.4; denies polyuria, polydipsia, vision changes    Review of Systems   Constitutional: Negative. HENT: Positive for sore throat and trouble swallowing. Respiratory: Negative. Cardiovascular: Negative. Gastrointestinal: Negative. Genitourinary: Negative. Neurological: Negative. Objective   Physical Exam  Vitals reviewed. Constitutional:       General: She is not in acute distress. Appearance: Normal appearance. HENT:      Head: Normocephalic and atraumatic. Mouth/Throat:      Mouth: Mucous membranes are moist.      Pharynx: Oropharynx is clear. Comments: Tongue cracked  Slight exudate on posterior oral pharynx; no lesions noted  Eyes:      General:         Right eye: No discharge. Left eye: No discharge. Cardiovascular:      Rate and Rhythm: Normal rate and regular rhythm. Pulses: Normal pulses. Heart sounds: Normal heart sounds. Pulmonary:      Effort: Pulmonary effort is normal.      Breath sounds: Normal breath sounds. Musculoskeletal:      Cervical back: Neck supple. No tenderness. Lymphadenopathy:      Cervical: No cervical adenopathy. Neurological:      General: No focal deficit present. Mental Status: She is alert and oriented to person, place, and time. Mental status is at baseline. Psychiatric:         Mood and Affect: Mood normal.         Behavior: Behavior normal.                  An electronic signature was used to authenticate this note.     --Cristhian Mendez MD

## 2022-03-03 ENCOUNTER — TELEPHONE (OUTPATIENT)
Dept: PRIMARY CARE CLINIC | Age: 87
End: 2022-03-03

## 2022-03-03 NOTE — TELEPHONE ENCOUNTER
Patients caregiver wanted to let you know- patient has not been eating- blood pressure this morning was 141/53 and then 128/69    Sugar was 82 and then 171    She does not want an appt sooner than the 8th.       Just wanted you aware

## 2022-03-08 ENCOUNTER — OFFICE VISIT (OUTPATIENT)
Dept: ENT CLINIC | Age: 87
End: 2022-03-08
Payer: MEDICARE

## 2022-03-08 VITALS — BODY MASS INDEX: 31.28 KG/M2 | HEIGHT: 57 IN | WEIGHT: 145 LBS

## 2022-03-08 DIAGNOSIS — R13.10 DYSPHAGIA, UNSPECIFIED TYPE: Primary | ICD-10-CM

## 2022-03-08 DIAGNOSIS — R13.10 ODYNOPHAGIA: ICD-10-CM

## 2022-03-08 PROCEDURE — 99203 OFFICE O/P NEW LOW 30 MIN: CPT | Performed by: OTOLARYNGOLOGY

## 2022-03-08 ASSESSMENT — ENCOUNTER SYMPTOMS
TROUBLE SWALLOWING: 1
SORE THROAT: 1
VOMITING: 0
EYE DISCHARGE: 0
COUGH: 0
EYE REDNESS: 0
STRIDOR: 0
NAUSEA: 0
ALLERGIC/IMMUNOLOGIC NEGATIVE: 1
VOICE CHANGE: 0
SHORTNESS OF BREATH: 0
COLOR CHANGE: 0

## 2022-03-08 NOTE — PROGRESS NOTES
[facility]  Dr. Michelle Monroe. SELWYN Messer Ms.Ed. New Consult       Patient Name:  Kelly Galloway  :  3/26/1928     CHIEF C/O:    Chief Complaint   Patient presents with    Other     Dry throat, previous Thrush       HISTORY OBTAINED FROM:  patient    HISTORY OF PRESENT ILLNESS:       Sherin Bautista is a 80y.o. year old female, here today for eval of swallowing. Pt notes 10 lb weight loss, swallowing trouble, and food getting stuck in throat. She also notes odynophagia and has been treated for thrush with multiple times. Pt may have had a stroke a few months ago and daughter states everything has gotten worse since then. Does ok with liquids, larger foods get stuck. Not on PPI.        Past Medical History:   Diagnosis Date    Arthritis     Bowel obstruction ([address]     Diabetes mellitus ([address]     Diverticulosis     Hyperlipidemia     Hypertension     Irritable bowel syndrome     Peptic ulcer disease     Spinal stenosis     Syncope and collapse     ad[facility]    Type 2 diabetes mellitus without complication ([address]      Past Surgical History:   Procedure Laterality Date    APPENDECTOMY      BREAST SURGERY Right     Lumpectomy    CHOLECYSTECTOMY      COLON SURGERY      COLONOSCOPY  2012    ECHO COMPL W DOP COLOR FLOW  2013         HYSTERECTOMY      JOINT REPLACEMENT      LTKA    KNEE ARTHROSCOPY      left knee    KNEE SURGERY      Right partial knee    PRE-MALIGNANT / BENIGN SKIN LESION EXCISION Left 2020    EXCISION OF LEFT SCALP CYST performed by Aretha Espinosa MD at [address] [place] [address]      obstructed bowel    THYROID SURGERY      goiter removed    TONSILLECTOMY      TOTAL KNEE ARTHROPLASTY Right        Current Outpatient Medications:     carvedilol (COREG) 3.125 MG tablet, Take 1 tablet by mouth 2 times daily, Disp: 90 tablet, Rfl: 0    dapagliflozin (FARXIGA) 5 MG tablet, Take 1 tablet by mouth every morning, Disp: 90 tablet, Rfl: 0    linagliptin (TRADJENTA) 5 MG tablet, Take 1 tablet by mouth daily, Disp: 90 tablet, Rfl: 0    losartan (COZAAR) 100 MG tablet, Take 1 tablet by mouth daily, Disp: 90 tablet, Rfl: 0    fluconazole (DIFLUCAN) 150 MG tablet, Take 1 tablet by mouth daily, Disp: 14 tablet, Rfl: 0    fenofibrate (TRIGLIDE) 160 MG tablet, Take 1 tablet by mouth daily, Disp: 90 tablet, Rfl: 0    amLODIPine (NORVASC) 5 MG tablet, Take 1 tablet by mouth 2 times daily, Disp: 180 tablet, Rfl: 0    gabapentin (NEURONTIN) 100 MG capsule, Take 1 capsule by mouth daily as needed (Pain) for up to 90 days. , Disp: 90 capsule, Rfl: 0    doxepin (SINEQUAN) 10 MG capsule, Take 1 capsule by mouth nightly, Disp: 30 capsule, Rfl: 0    oxybutynin (DITROPAN-XL) 5 MG extended release tablet, Take 1 tablet by mouth daily, Disp: 90 tablet, Rfl: 0    aspirin 81 MG chewable tablet, Take 1 tablet by mouth daily, Disp: 30 tablet, Rfl: 3    ONETOUCH ULTRA strip, TEST once daily, Disp: , Rfl:     Lancets (ONETOUCH DELICA PLUS PUMDBY80U) MISC, TEST once daily, Disp: , Rfl:     Lactobacillus (ACIDOPHILUS PROBIOTIC) 10 MG CAPS, Take 10 mg by mouth daily, Disp: 90 capsule, Rfl: 0    ferrous sulfate (IRON 325) 325 (65 Fe) MG tablet, Take 162.5 mg by mouth every other day , Disp: , Rfl:     psyllium (METAMUCIL) 28 % packet, Take 1 packet by mouth 2 times daily as needed (Constipation) , Disp: , Rfl:     glimepiride (AMARYL) 4 MG tablet, Take 1 tablet by mouth every morning (before breakfast), Disp: 90 tablet, Rfl: 0    diphenhydrAMINE-APAP, sleep, (TYLENOL PM EXTRA STRENGTH)  MG tablet, Take 1 tablet by mouth daily as needed (Pain) (Patient not taking: Reported on 2/17/2022), Disp: , Rfl:     nystatin-triamcinolone (MYCOLOG) 620669-9.1 UNIT/GM-% ointment, Apply 1 each topically 2 times daily as needed (Itching)  (Patient not taking: Reported on 3/8/2022), Disp: , Rfl:   Codeine and Morphine  Social History     Tobacco Use  Smoking status: Never Smoker    Smokeless tobacco: Never Used   Vaping Use    Vaping Use: Never used   Substance Use Topics    Alcohol use: No    Drug use: No     Family History   Problem Relation Age of Onset    Heart Disease Mother     Cancer Father         Skin       Review of Systems   Constitutional: Negative for chills, fatigue and fever. HENT: Positive for sore throat and trouble swallowing. Negative for voice change. Eyes: Negative for discharge and redness. Respiratory: Negative for cough, shortness of breath and stridor. Gastrointestinal: Negative for nausea and vomiting. Endocrine: Negative. Genitourinary: Negative. Musculoskeletal: Negative. Skin: Negative for color change and rash. Allergic/Immunologic: Negative. Neurological: Negative for dizziness, speech difficulty and headaches. Hematological: Negative. Psychiatric/Behavioral: Negative for agitation and confusion. All other systems reviewed and are negative. Ht 4' 9\" (1.448 m)   Wt 145 lb (65.8 kg)   LMP  (LMP Unknown)   BMI 31.38 kg/m²   Physical Exam  Constitutional:       Appearance: Normal appearance. HENT:      Head: Normocephalic and atraumatic. Right Ear: Tympanic membrane, ear canal and external ear normal.      Left Ear: Tympanic membrane, ear canal and external ear normal.      Nose: Nose normal.      Mouth/Throat:      Mouth: Mucous membranes are moist.   Eyes:      Pupils: Pupils are equal, round, and reactive to light. Cardiovascular:      Rate and Rhythm: Normal rate. Pulmonary:      Effort: Pulmonary effort is normal.   Musculoskeletal:         General: Normal range of motion. Cervical back: Normal range of motion. Skin:     General: Skin is warm and dry. Neurological:      General: No focal deficit present. Mental Status: She is alert and oriented to person, place, and time.        Endoscopy Procedure Note    Pre-operative Diagnosis: dysphagia   Post-operative Diagnosis: same  Indications: Hoarseness, dysphagia or aspiration - not able to be clearly evaluated by indirect laryngoscopy  Anesthesia: Lidocaine 4% and Peyman-Synephrine 1/2%  Endoscopy Type:  Flexible nasopharyngolaryngoscopy  Procedure Details   The flexible nasopharyngolaryngoscope was passed through the left side(s) of the nose, and the nose, nasopharynx, oropharynx, hypopharynx and larynx were examined. Examination was performed during quiet respiration and with phonation. The following findings were noted. Findings:  Normal nasopharynx, normal epiglottis, normal tongue base, normal pyriform, normal TVC motion and mucosa, no subglottic masses or lesions. Condition:  Stable  Complications:  None          IMPRESSION/PLAN:  Dysphagia and odynophagia   Scope today clear, no evidence of thrush   Dysphagia may be neurological in nature in the setting of possible stroke   Recommend MBS and esophagram  Recommend GI eval, daughter will contact PCP to get GI appt     Electronically signed by Elke Bell DO on 3/8/2022 at 9:29 AM                    Dominga Rick  3/26/1928      I have discussed the case, including pertinent history and exam findings with the resident. I have seen and examined the patient and the key elements of the encounter have been performed by me. I agree with the assessment, plan and orders as documented by the resident. Patient here for follow up of medical problems. Remainder of medical problems as per resident note.       [id number] DO Cesar  3/20/22

## 2022-03-10 ENCOUNTER — HOSPITAL ENCOUNTER (INPATIENT)
Age: 87
LOS: 4 days | Discharge: HOME HEALTH | DRG: 871 | End: 2022-03-14
Attending: EMERGENCY MEDICINE | Admitting: INTERNAL MEDICINE
Payer: MEDICARE

## 2022-03-10 ENCOUNTER — APPOINTMENT (OUTPATIENT)
Dept: GENERAL RADIOLOGY | Age: 87
DRG: 871 | End: 2022-03-10
Payer: MEDICARE

## 2022-03-10 ENCOUNTER — OFFICE VISIT (OUTPATIENT)
Dept: PRIMARY CARE CLINIC | Age: 87
End: 2022-03-10
Payer: MEDICARE

## 2022-03-10 VITALS — HEART RATE: 98 BPM | OXYGEN SATURATION: 94 %

## 2022-03-10 DIAGNOSIS — E43 SEVERE PROTEIN-CALORIE MALNUTRITION (HCC): Chronic | ICD-10-CM

## 2022-03-10 DIAGNOSIS — E86.0 DEHYDRATION: ICD-10-CM

## 2022-03-10 DIAGNOSIS — J96.01 ACUTE RESPIRATORY FAILURE WITH HYPOXIA (HCC): ICD-10-CM

## 2022-03-10 DIAGNOSIS — Z79.4 CONTROLLED TYPE 2 DIABETES MELLITUS WITH DIABETIC NEUROPATHY, WITH LONG-TERM CURRENT USE OF INSULIN (HCC): ICD-10-CM

## 2022-03-10 DIAGNOSIS — J18.9 PNEUMONIA OF BOTH LUNGS DUE TO INFECTIOUS ORGANISM, UNSPECIFIED PART OF LUNG: Primary | ICD-10-CM

## 2022-03-10 DIAGNOSIS — R53.1 WEAKNESS ACQUIRED IN ICU: Primary | ICD-10-CM

## 2022-03-10 DIAGNOSIS — E11.40 CONTROLLED TYPE 2 DIABETES MELLITUS WITH DIABETIC NEUROPATHY, WITH LONG-TERM CURRENT USE OF INSULIN (HCC): ICD-10-CM

## 2022-03-10 DIAGNOSIS — J18.9 BILATERAL UPPER LOBE COMMUNITY ACQUIRED PNEUMONIA: ICD-10-CM

## 2022-03-10 LAB
ALBUMIN SERPL-MCNC: 2.7 G/DL (ref 3.5–5.2)
ALBUMIN SERPL-MCNC: 2.9 G/DL (ref 3.5–5.2)
ALP BLD-CCNC: 46 U/L (ref 35–104)
ALP BLD-CCNC: 49 U/L (ref 35–104)
ALT SERPL-CCNC: 10 U/L (ref 0–32)
ALT SERPL-CCNC: 8 U/L (ref 0–32)
ANION GAP SERPL CALCULATED.3IONS-SCNC: 13 MMOL/L (ref 7–16)
ANION GAP SERPL CALCULATED.3IONS-SCNC: 14 MMOL/L (ref 7–16)
AST SERPL-CCNC: 21 U/L (ref 0–31)
AST SERPL-CCNC: 22 U/L (ref 0–31)
BACTERIA: ABNORMAL /HPF
BASOPHILS ABSOLUTE: 0.07 E9/L (ref 0–0.2)
BASOPHILS RELATIVE PERCENT: 0.6 % (ref 0–2)
BILIRUB SERPL-MCNC: 0.3 MG/DL (ref 0–1.2)
BILIRUB SERPL-MCNC: 0.5 MG/DL (ref 0–1.2)
BILIRUBIN URINE: NEGATIVE
BLOOD, URINE: NEGATIVE
BUN BLDV-MCNC: 22 MG/DL (ref 6–23)
BUN BLDV-MCNC: 28 MG/DL (ref 6–23)
CALCIUM SERPL-MCNC: 8.3 MG/DL (ref 8.6–10.2)
CALCIUM SERPL-MCNC: 8.8 MG/DL (ref 8.6–10.2)
CHLORIDE BLD-SCNC: 106 MMOL/L (ref 98–107)
CHLORIDE BLD-SCNC: 108 MMOL/L (ref 98–107)
CHP ED QC CHECK: NORMAL
CLARITY: CLEAR
CO2: 15 MMOL/L (ref 22–29)
CO2: 18 MMOL/L (ref 22–29)
COLOR: YELLOW
CREAT SERPL-MCNC: 1 MG/DL (ref 0.5–1)
CREAT SERPL-MCNC: 1.1 MG/DL (ref 0.5–1)
EOSINOPHILS ABSOLUTE: 0.14 E9/L (ref 0.05–0.5)
EOSINOPHILS RELATIVE PERCENT: 1.3 % (ref 0–6)
EPITHELIAL CELLS, UA: ABNORMAL /HPF
GFR AFRICAN AMERICAN: 56
GFR AFRICAN AMERICAN: >60
GFR NON-AFRICAN AMERICAN: 46 ML/MIN/1.73
GFR NON-AFRICAN AMERICAN: 52 ML/MIN/1.73
GLUCOSE BLD-MCNC: 185 MG/DL
GLUCOSE BLD-MCNC: 212 MG/DL (ref 74–99)
GLUCOSE BLD-MCNC: 284 MG/DL (ref 74–99)
GLUCOSE URINE: >=1000 MG/DL
HCT VFR BLD CALC: 38 % (ref 34–48)
HCT VFR BLD CALC: 43.5 % (ref 34–48)
HEMOGLOBIN: 11.9 G/DL (ref 11.5–15.5)
HEMOGLOBIN: 13.3 G/DL (ref 11.5–15.5)
IMMATURE GRANULOCYTES #: 0.21 E9/L
IMMATURE GRANULOCYTES %: 1.9 % (ref 0–5)
INR BLD: 1.6
KETONES, URINE: NEGATIVE MG/DL
LACTIC ACID: 1 MMOL/L (ref 0.5–2.2)
LEUKOCYTE ESTERASE, URINE: NEGATIVE
LYMPHOCYTES ABSOLUTE: 1.8 E9/L (ref 1.5–4)
LYMPHOCYTES RELATIVE PERCENT: 16.4 % (ref 20–42)
MCH RBC QN AUTO: 26.8 PG (ref 26–35)
MCH RBC QN AUTO: 27.7 PG (ref 26–35)
MCHC RBC AUTO-ENTMCNC: 30.6 % (ref 32–34.5)
MCHC RBC AUTO-ENTMCNC: 31.3 % (ref 32–34.5)
MCV RBC AUTO: 87.7 FL (ref 80–99.9)
MCV RBC AUTO: 88.4 FL (ref 80–99.9)
MONOCYTES ABSOLUTE: 0.85 E9/L (ref 0.1–0.95)
MONOCYTES RELATIVE PERCENT: 7.7 % (ref 2–12)
NEUTROPHILS ABSOLUTE: 7.93 E9/L (ref 1.8–7.3)
NEUTROPHILS RELATIVE PERCENT: 72.1 % (ref 43–80)
NITRITE, URINE: NEGATIVE
PDW BLD-RTO: 13.7 FL (ref 11.5–15)
PDW BLD-RTO: 13.8 FL (ref 11.5–15)
PH UA: 5 (ref 5–9)
PLATELET # BLD: 390 E9/L (ref 130–450)
PLATELET # BLD: 416 E9/L (ref 130–450)
PMV BLD AUTO: 12 FL (ref 7–12)
PMV BLD AUTO: 12.5 FL (ref 7–12)
POTASSIUM REFLEX MAGNESIUM: 4.3 MMOL/L (ref 3.5–5)
POTASSIUM SERPL-SCNC: 4.7 MMOL/L (ref 3.5–5)
PRO-BNP: 438 PG/ML (ref 0–450)
PROTEIN UA: NEGATIVE MG/DL
PROTHROMBIN TIME: 19.2 SEC (ref 9.3–12.4)
RBC # BLD: 4.3 E12/L (ref 3.5–5.5)
RBC # BLD: 4.96 E12/L (ref 3.5–5.5)
RBC UA: ABNORMAL /HPF (ref 0–2)
REASON FOR REJECTION: NORMAL
REJECTED TEST: NORMAL
SARS-COV-2, NAAT: NOT DETECTED
SODIUM BLD-SCNC: 137 MMOL/L (ref 132–146)
SODIUM BLD-SCNC: 137 MMOL/L (ref 132–146)
SPECIFIC GRAVITY UA: 1.01 (ref 1–1.03)
TOTAL PROTEIN: 6.8 G/DL (ref 6.4–8.3)
TOTAL PROTEIN: 7.1 G/DL (ref 6.4–8.3)
TROPONIN, HIGH SENSITIVITY: 23 NG/L (ref 0–9)
TROPONIN, HIGH SENSITIVITY: 26 NG/L (ref 0–9)
UROBILINOGEN, URINE: 0.2 E.U./DL
WBC # BLD: 11 E9/L (ref 4.5–11.5)
WBC # BLD: 5.8 E9/L (ref 4.5–11.5)
WBC UA: ABNORMAL /HPF (ref 0–5)

## 2022-03-10 PROCEDURE — 96374 THER/PROPH/DIAG INJ IV PUSH: CPT

## 2022-03-10 PROCEDURE — 81001 URINALYSIS AUTO W/SCOPE: CPT

## 2022-03-10 PROCEDURE — 2580000003 HC RX 258

## 2022-03-10 PROCEDURE — 87040 BLOOD CULTURE FOR BACTERIA: CPT

## 2022-03-10 PROCEDURE — 84484 ASSAY OF TROPONIN QUANT: CPT

## 2022-03-10 PROCEDURE — 80053 COMPREHEN METABOLIC PANEL: CPT

## 2022-03-10 PROCEDURE — 99285 EMERGENCY DEPT VISIT HI MDM: CPT

## 2022-03-10 PROCEDURE — 2580000003 HC RX 258: Performed by: EMERGENCY MEDICINE

## 2022-03-10 PROCEDURE — 6370000000 HC RX 637 (ALT 250 FOR IP): Performed by: INTERNAL MEDICINE

## 2022-03-10 PROCEDURE — 82962 GLUCOSE BLOOD TEST: CPT | Performed by: INTERNAL MEDICINE

## 2022-03-10 PROCEDURE — 71045 X-RAY EXAM CHEST 1 VIEW: CPT

## 2022-03-10 PROCEDURE — 87635 SARS-COV-2 COVID-19 AMP PRB: CPT

## 2022-03-10 PROCEDURE — 83880 ASSAY OF NATRIURETIC PEPTIDE: CPT

## 2022-03-10 PROCEDURE — 1200000000 HC SEMI PRIVATE

## 2022-03-10 PROCEDURE — 83605 ASSAY OF LACTIC ACID: CPT

## 2022-03-10 PROCEDURE — 6360000002 HC RX W HCPCS: Performed by: INTERNAL MEDICINE

## 2022-03-10 PROCEDURE — 2500000003 HC RX 250 WO HCPCS: Performed by: EMERGENCY MEDICINE

## 2022-03-10 PROCEDURE — 87150 DNA/RNA AMPLIFIED PROBE: CPT

## 2022-03-10 PROCEDURE — 36415 COLL VENOUS BLD VENIPUNCTURE: CPT

## 2022-03-10 PROCEDURE — 3051F HG A1C>EQUAL 7.0%<8.0%: CPT | Performed by: INTERNAL MEDICINE

## 2022-03-10 PROCEDURE — 2580000003 HC RX 258: Performed by: INTERNAL MEDICINE

## 2022-03-10 PROCEDURE — 94640 AIRWAY INHALATION TREATMENT: CPT

## 2022-03-10 PROCEDURE — 99213 OFFICE O/P EST LOW 20 MIN: CPT | Performed by: INTERNAL MEDICINE

## 2022-03-10 PROCEDURE — 85610 PROTHROMBIN TIME: CPT

## 2022-03-10 PROCEDURE — 6360000002 HC RX W HCPCS: Performed by: EMERGENCY MEDICINE

## 2022-03-10 PROCEDURE — 85025 COMPLETE CBC W/AUTO DIFF WBC: CPT

## 2022-03-10 PROCEDURE — 85027 COMPLETE CBC AUTOMATED: CPT

## 2022-03-10 PROCEDURE — 2700000000 HC OXYGEN THERAPY PER DAY

## 2022-03-10 RX ORDER — CARVEDILOL 3.12 MG/1
3.12 TABLET ORAL 2 TIMES DAILY
Status: DISCONTINUED | OUTPATIENT
Start: 2022-03-10 | End: 2022-03-14 | Stop reason: HOSPADM

## 2022-03-10 RX ORDER — LOSARTAN POTASSIUM 50 MG/1
100 TABLET ORAL DAILY
Status: DISCONTINUED | OUTPATIENT
Start: 2022-03-10 | End: 2022-03-14 | Stop reason: HOSPADM

## 2022-03-10 RX ORDER — DEXAMETHASONE SODIUM PHOSPHATE 10 MG/ML
10 INJECTION INTRAMUSCULAR; INTRAVENOUS ONCE
Status: COMPLETED | OUTPATIENT
Start: 2022-03-10 | End: 2022-03-10

## 2022-03-10 RX ORDER — SODIUM CHLORIDE 9 MG/ML
INJECTION, SOLUTION INTRAVENOUS
Status: COMPLETED
Start: 2022-03-10 | End: 2022-03-10

## 2022-03-10 RX ORDER — LACTOBACILLUS RHAMNOSUS GG 10B CELL
1 CAPSULE ORAL DAILY
Status: DISCONTINUED | OUTPATIENT
Start: 2022-03-10 | End: 2022-03-14 | Stop reason: HOSPADM

## 2022-03-10 RX ORDER — ASPIRIN 81 MG/1
81 TABLET, CHEWABLE ORAL DAILY
Status: DISCONTINUED | OUTPATIENT
Start: 2022-03-10 | End: 2022-03-14 | Stop reason: HOSPADM

## 2022-03-10 RX ORDER — 0.9 % SODIUM CHLORIDE 0.9 %
500 INTRAVENOUS SOLUTION INTRAVENOUS ONCE
Status: COMPLETED | OUTPATIENT
Start: 2022-03-10 | End: 2022-03-10

## 2022-03-10 RX ORDER — SODIUM CHLORIDE 9 MG/ML
INJECTION, SOLUTION INTRAVENOUS CONTINUOUS
Status: DISCONTINUED | OUTPATIENT
Start: 2022-03-10 | End: 2022-03-14 | Stop reason: HOSPADM

## 2022-03-10 RX ORDER — ALOGLIPTIN 6.25 MG/1
12.5 TABLET, FILM COATED ORAL DAILY
Refills: 0 | Status: DISCONTINUED | OUTPATIENT
Start: 2022-03-10 | End: 2022-03-10

## 2022-03-10 RX ORDER — DOXEPIN HYDROCHLORIDE 10 MG/1
10 CAPSULE ORAL NIGHTLY
Status: DISCONTINUED | OUTPATIENT
Start: 2022-03-10 | End: 2022-03-14 | Stop reason: HOSPADM

## 2022-03-10 RX ORDER — ALBUTEROL SULFATE 1.25 MG/3ML
1.25 SOLUTION RESPIRATORY (INHALATION) 4 TIMES DAILY
Status: DISCONTINUED | OUTPATIENT
Start: 2022-03-10 | End: 2022-03-14 | Stop reason: HOSPADM

## 2022-03-10 RX ORDER — OXYBUTYNIN CHLORIDE 5 MG/1
5 TABLET, EXTENDED RELEASE ORAL DAILY
Status: DISCONTINUED | OUTPATIENT
Start: 2022-03-10 | End: 2022-03-12

## 2022-03-10 RX ORDER — AMLODIPINE BESYLATE 5 MG/1
5 TABLET ORAL DAILY
Status: DISCONTINUED | OUTPATIENT
Start: 2022-03-10 | End: 2022-03-12

## 2022-03-10 RX ORDER — ALOGLIPTIN 12.5 MG/1
12.5 TABLET, FILM COATED ORAL DAILY
Status: DISCONTINUED | OUTPATIENT
Start: 2022-03-10 | End: 2022-03-14 | Stop reason: HOSPADM

## 2022-03-10 RX ADMIN — WATER 1000 MG: 1 INJECTION INTRAMUSCULAR; INTRAVENOUS; SUBCUTANEOUS at 13:48

## 2022-03-10 RX ADMIN — Medication 500 ML: at 13:57

## 2022-03-10 RX ADMIN — SODIUM CHLORIDE 500 ML: 9 INJECTION, SOLUTION INTRAVENOUS at 13:57

## 2022-03-10 RX ADMIN — ENOXAPARIN SODIUM 40 MG: 100 INJECTION SUBCUTANEOUS at 18:44

## 2022-03-10 RX ADMIN — SODIUM CHLORIDE: 9 INJECTION, SOLUTION INTRAVENOUS at 18:40

## 2022-03-10 RX ADMIN — DEXAMETHASONE SODIUM PHOSPHATE 10 MG: 10 INJECTION INTRAMUSCULAR; INTRAVENOUS at 10:54

## 2022-03-10 RX ADMIN — DEXTROSE MONOHYDRATE 100 MG: 5 INJECTION INTRAVENOUS at 13:54

## 2022-03-10 RX ADMIN — CARVEDILOL 3.12 MG: 3.12 TABLET, FILM COATED ORAL at 21:11

## 2022-03-10 RX ADMIN — ALBUTEROL SULFATE 1.25 MG: 1.25 SOLUTION RESPIRATORY (INHALATION) at 22:04

## 2022-03-10 RX ADMIN — DOXEPIN HYDROCHLORIDE 10 MG: 10 CAPSULE ORAL at 23:13

## 2022-03-10 ASSESSMENT — ENCOUNTER SYMPTOMS
BACK PAIN: 0
SHORTNESS OF BREATH: 1
ABDOMINAL PAIN: 0

## 2022-03-10 ASSESSMENT — PAIN SCALES - GENERAL: PAINLEVEL_OUTOF10: 0

## 2022-03-10 NOTE — PROGRESS NOTES
upon arriving at office, patient family walked her with transport walker to restroom and return to scale to be weighed. patient appearance dusky. sat patient down on transport roller and PO 71 %. with Coached breathing and rest resolved to 86 % oxygen. .  Patient appeared lethargic arousable extremely weak barely able to talk,I ordered oxygen 3 l simple mask to be applied. , glucometer reading 185. I advised the staff to call 911 to transport to [address] [place], [place] [address] [facility] emergency room. Pulse ox came up to 95% on 3liters and patient more alert .

## 2022-03-10 NOTE — PROGRESS NOTES
Pharmacist Review and Automatic Dose Adjustment of Prophylactic Enoxaparin      The reviewing pharmacist has made an adjustment to the ordered enoxaparin dose or converted to UFH per the approved [facility] protocol and table as identified below. Ernesto Gilford is a 80 y.o. female. Recent Labs     03/10/22  1049   CREATININE 1.1*   CrCl = 32.8 ml/min    CrCl cannot be calculated (Unknown ideal weight.).     Height:   Ht Readings from Last 1 Encounters:   03/10/22 4' 9\" (1.448 m)     Weight:  Wt Readings from Last 1 Encounters:   03/10/22 146 lb 6.4 oz (66.4 kg)               Plan: Based upon the patient's weight and renal function, the enoxaparin dose has been changed/converted from Lovenox 30 mg SQ daily to Lovenox 40 mg SQ daily      Thank you,  Tanya Martinez, [facility]  3/10/2022, 5:58 PM

## 2022-03-10 NOTE — ED PROVIDER NOTES
This is a 59-year-old female with a past medical history of hypertension as well as diabetes who presents to the ED for evaluation of shortness of breath. Patient states that for the past 2 days been feeling increasingly short of breath. Patient also states she feels more fatigued than normal.  Patient states that she has no cough no chest pain or ill contacts. No reported mitigating or exacerbating factors. She does not use oxygen at home. No leg swelling. No recent travel. The history is provided by the patient. Review of Systems   Constitutional: Positive for fatigue. Negative for fever. HENT: Negative for congestion. Eyes: Negative for visual disturbance. Respiratory: Positive for shortness of breath. Cardiovascular: Negative for chest pain. Gastrointestinal: Negative for abdominal pain. Endocrine: Negative for polyuria. Genitourinary: Negative for dysuria. Musculoskeletal: Negative for back pain. Skin: Negative for rash. Allergic/Immunologic: Negative for immunocompromised state. Neurological: Negative for headaches. Hematological: Does not bruise/bleed easily. Psychiatric/Behavioral: Negative for confusion. Physical Exam  Vitals and nursing note reviewed. Constitutional:       General: She is not in acute distress. Appearance: She is well-developed. She is not ill-appearing. HENT:      Head: Normocephalic and atraumatic. Mouth/Throat:      Mouth: Mucous membranes are moist.   Eyes:      Extraocular Movements: Extraocular movements intact. Neck:      Vascular: No JVD. Cardiovascular:      Rate and Rhythm: Normal rate and regular rhythm. Heart sounds: No murmur heard. Pulmonary:      Effort: Pulmonary effort is normal.      Breath sounds: No wheezing, rhonchi or rales. Chest:      Chest wall: No tenderness. Abdominal:      General: There is no distension. Palpations: Abdomen is soft. Tenderness:  There is no abdominal tenderness. There is no guarding or rebound. Hernia: No hernia is present. Musculoskeletal:      Cervical back: Normal range of motion and neck supple. Right lower leg: No edema. Left lower leg: No edema. Skin:     General: Skin is warm and dry. Capillary Refill: Capillary refill takes less than 2 seconds. Neurological:      General: No focal deficit present. Mental Status: She is alert and oriented to person, place, and time. Cranial Nerves: No cranial nerve deficit. Psychiatric:         Mood and Affect: Mood normal.         Behavior: Behavior normal.          Procedures     MDM  Number of Diagnoses or Management Options  Pneumonia of both lungs due to infectious organism, unspecified part of lung  Diagnosis management comments: Patient presented to the ED for evaluation of slight cough and fatigue with decrease desire to eat. Patient apparently was at her PCPs office and nearly collapsed. Patient was hypoxic and placed on supplemetnal oxygen. CXR concerning for bilateral pnuemonia. Spoke with PCP who wished to start Doxy and Rocephin. She was hemodynamically stable and spoke with daughter at bedside who was agreeable with admission.                  --------------------------------------------- PAST HISTORY ---------------------------------------------  Past Medical History:  has a past medical history of Arthritis, Bowel obstruction ([address] Diabetes mellitus ([address] Diverticulosis, Hyperlipidemia, Hypertension, Irritable bowel syndrome, Peptic ulcer disease, Spinal stenosis, Syncope and collapse, and Type 2 diabetes mellitus without complication ([address] Past Surgical History:  has a past surgical history that includes Hysterectomy; Cholecystectomy; Tonsillectomy; Colon surgery; Appendectomy; Breast surgery (Right); joint replacement; ECHO Compl W Dop Color Flow (04/22/2013); Thyroid surgery; Small intestine surgery; Knee arthroscopy;  Total knee arthroplasty (Right); Colonoscopy (08/2012); pre-malignant / benign skin lesion excision (Left, 02/04/2020); and knee surgery. Social History:  reports that she has never smoked. She has never used smokeless tobacco. She reports that she does not drink alcohol and does not use drugs. Family History: family history includes Cancer in her father; Heart Disease in her mother. The patients home medications have been reviewed. Allergies: Codeine and Morphine    -------------------------------------------------- RESULTS -------------------------------------------------    LABS:  Results for orders placed or performed during the hospital encounter of 03/10/22   COVID-19, Rapid    Specimen: Nasopharyngeal Swab   Result Value Ref Range    SARS-CoV-2, NAAT Not Detected Not Detected   Culture, Blood 1    Specimen: Blood   Result Value Ref Range    Blood Culture, Routine 24 Hours no growth    Culture, Blood 2    Specimen: Blood   Result Value Ref Range    Culture, Blood 2 (A)      Gram stain performed from blood culture bottle media  Gram positive cocci in clusters      Organism Staphylococcus coagulase-negative (A)     Culture, Blood 2       This organism was isolated in one set. Susceptibility testing is not routinely done as this  organism frequently represents skin contamination. Additional testing can be ordered by calling the  Microbiology Department.      Comprehensive Metabolic Panel w/ Reflex to MG   Result Value Ref Range    Sodium 137 132 - 146 mmol/L    Potassium reflex Magnesium 4.3 3.5 - 5.0 mmol/L    Chloride 106 98 - 107 mmol/L    CO2 18 (L) 22 - 29 mmol/L    Anion Gap 13 7 - 16 mmol/L    Glucose 212 (H) 74 - 99 mg/dL    BUN 22 6 - 23 mg/dL    CREATININE 1.1 (H) 0.5 - 1.0 mg/dL    GFR Non-African American 46 >=60 mL/min/1.73    GFR African American 56     Calcium 8.8 8.6 - 10.2 mg/dL    Total Protein 7.1 6.4 - 8.3 g/dL    Albumin 2.9 (L) 3.5 - 5.2 g/dL    Total Bilirubin 0.5 0.0 - 1.2 mg/dL    Alkaline Phosphatase 49 35 - 104 U/L    ALT 10 0 - 32 U/L    AST 22 0 - 31 U/L   CBC with Auto Differential   Result Value Ref Range    WBC 11.0 4.5 - 11.5 E9/L    RBC 4.96 3.50 - 5.50 E12/L    Hemoglobin 13.3 11.5 - 15.5 g/dL    Hematocrit 43.5 34.0 - 48.0 %    MCV 87.7 80.0 - 99.9 fL    MCH 26.8 26.0 - 35.0 pg    MCHC 30.6 (L) 32.0 - 34.5 %    RDW 13.7 11.5 - 15.0 fL    Platelets 907 764 - 545 E9/L    MPV 12.5 (H) 7.0 - 12.0 fL    Neutrophils % 72.1 43.0 - 80.0 %    Immature Granulocytes % 1.9 0.0 - 5.0 %    Lymphocytes % 16.4 (L) 20.0 - 42.0 %    Monocytes % 7.7 2.0 - 12.0 %    Eosinophils % 1.3 0.0 - 6.0 %    Basophils % 0.6 0.0 - 2.0 %    Neutrophils Absolute 7.93 (H) 1.80 - 7.30 E9/L    Immature Granulocytes # 0.21 E9/L    Lymphocytes Absolute 1.80 1.50 - 4.00 E9/L    Monocytes Absolute 0.85 0.10 - 0.95 E9/L    Eosinophils Absolute 0.14 0.05 - 0.50 E9/L    Basophils Absolute 0.07 0.00 - 0.20 E9/L   Troponin   Result Value Ref Range    Troponin, High Sensitivity 26 (H) 0 - 9 ng/L   Brain Natriuretic Peptide   Result Value Ref Range    Pro- 0 - 450 pg/mL   Protime-INR   Result Value Ref Range    Protime 19.2 (H) 9.3 - 12.4 sec    INR 1.6    Urinalysis with Microscopic   Result Value Ref Range    Color, UA Yellow Straw/Yellow    Clarity, UA Clear Clear    Glucose, Ur >=1000 (A) Negative mg/dL    Bilirubin Urine Negative Negative    Ketones, Urine Negative Negative mg/dL    Specific Gravity, UA 1.015 1.005 - 1.030    Blood, Urine Negative Negative    pH, UA 5.0 5.0 - 9.0    Protein, UA Negative Negative mg/dL    Urobilinogen, Urine 0.2 <2.0 E.U./dL    Nitrite, Urine Negative Negative    Leukocyte Esterase, Urine Negative Negative    WBC, UA 5-10 (A) 0 - 5 /HPF    RBC, UA 0-1 0 - 2 /HPF    Epithelial Cells, UA FEW /HPF    Bacteria, UA MANY (A) None Seen /HPF   Lactic Acid   Result Value Ref Range    Lactic Acid 1.0 0.5 - 2.2 mmol/L   SPECIMEN REJECTION   Result Value Ref Range    Rejected Test TRP5     Reason for Rejection see below    Troponin   Result Value Ref Range    Troponin, High Sensitivity 23 (H) 0 - 9 ng/L   CBC   Result Value Ref Range    WBC 5.8 4.5 - 11.5 E9/L    RBC 4.30 3.50 - 5.50 E12/L    Hemoglobin 11.9 11.5 - 15.5 g/dL    Hematocrit 38.0 34.0 - 48.0 %    MCV 88.4 80.0 - 99.9 fL    MCH 27.7 26.0 - 35.0 pg    MCHC 31.3 (L) 32.0 - 34.5 %    RDW 13.8 11.5 - 15.0 fL    Platelets 128 973 - 862 E9/L    MPV 12.0 7.0 - 12.0 fL   Comprehensive Metabolic Panel   Result Value Ref Range    Sodium 137 132 - 146 mmol/L    Potassium 4.7 3.5 - 5.0 mmol/L    Chloride 108 (H) 98 - 107 mmol/L    CO2 15 (L) 22 - 29 mmol/L    Anion Gap 14 7 - 16 mmol/L    Glucose 284 (H) 74 - 99 mg/dL    BUN 28 (H) 6 - 23 mg/dL    CREATININE 1.0 0.5 - 1.0 mg/dL    GFR Non-African American 52 >=60 mL/min/1.73    GFR African American >60     Calcium 8.3 (L) 8.6 - 10.2 mg/dL    Total Protein 6.8 6.4 - 8.3 g/dL    Albumin 2.7 (L) 3.5 - 5.2 g/dL    Total Bilirubin 0.3 0.0 - 1.2 mg/dL    Alkaline Phosphatase 46 35 - 104 U/L    ALT 8 0 - 32 U/L    AST 21 0 - 31 U/L   Blood ID, Molecular   Result Value Ref Range    Bottle Type Anaerobic     Source of Blood Culture Antecubital-Lef     Order Number [id number]     Acinetobacter calcoac baumannii complex by PCR Not Detected Not Detected    Bacteroides fragilis by PCR Not Detected Not Detected    Enterobacteriaceae by PCR Not Detected Not Detected    Enterobacter cloacae complex by PCR Not Detected Not Detected    Enterococcus faecalis by PCR Not Detected Not Detected    Enterococcus faecium by PCR Not Detected Not Detected    Escherichia coli by PCR Not Detected Not Detected    Haemophilus Influenzae by PCR Not Detected Not Detected    Klebsiella aerogenes by PCR Not Detected Not Detected    Klebsiella oxytoca by PCR Not Detected Not Detected    Klebsiella pneumoniae group by PCR Not Detected Not Detected    Listeria monocytogenes by PCR Not Detected Not Detected    Neisseria meningitidis by PCR Not Detected Not Detected Proteus species by PCR Not Detected Not Detected    Pseudomonas aeruginosa by PCR Not Detected Not Detected    Salmonella species by PCR Not Detected Not Detected    Streptococcus agalactiae by PCR Not Detected Not Detected    Staphylococcus aureus by PCR Not Detected Not Detected    Staphylococcus epidermidis by PCR Not Detected Not Detected    Staphylococcus lugdunensis by PCR Not Detected Not Detected    Staphylococcus species by PCR DETECTED (AA) Not Detected    Serratia marcescens by PCR Not Detected Not Detected    Streptococcus pneumoniae by PCR Not Detected Not Detected    Streptococcus pyogenes  by PCR Not Detected Not Detected    Streptococcus species by PCR Not Detected Not Detected    Stenotrophomonas maltophilia by PCR Not Detected Not Detected    Candida albicans by PCR Not Detected Not Detected    Candida auris by PCR Not Detected Not Detected    Candida glabrata by PCR Not Detected Not Detected    Candida krusei by PCR Not Detected Not Detected    Candida parapsilosis by PCR Not Detected Not Detected    Candida tropicalis by PCR Not Detected Not Detected    Cryptococcus neoformans/gattii by PCR Not Detected Not Detected   Procalcitonin   Result Value Ref Range    Procalcitonin 0.18 (H) 0.00 - 0.08 ng/mL   Covid-19, Antibody, Total   Result Value Ref Range    SARS-CoV-2 Antibody, Total Reactive Non Reactive       RADIOLOGY:  CT CHEST WO CONTRAST   Final Result   1. Patchy ground-glass opacities throughout both lungs suggesting bilateral   pneumonia with mixed areas of subsegmental atelectasis. 2. Stable enlarged lower paratracheal lymph node. 3. View of upper abdomen shows splenomegaly. CT abdomen may be helpful for   further evaluation. FL MODIFIED BARIUM SWALLOW W VIDEO   Final Result   Swallowing dysfunction as noted above including deep laryngeal penetration   with thin liquid barium. No evidence of aspiration.       Please see separate speech pathology report for full discussion of lung        Disposition:  Patient's disposition: Admit to telemetry  Patient's condition is stable.         Catrina Walsh DO  03/13/22 [id number]

## 2022-03-11 ENCOUNTER — APPOINTMENT (OUTPATIENT)
Dept: GENERAL RADIOLOGY | Age: 87
DRG: 871 | End: 2022-03-11
Payer: MEDICARE

## 2022-03-11 PROBLEM — E43 SEVERE PROTEIN-CALORIE MALNUTRITION (HCC): Chronic | Status: ACTIVE | Noted: 2022-03-11

## 2022-03-11 LAB
ACINETOBACTER CALCOAC BAUMANNII COMPLEX BY PCR: NOT DETECTED
BACTEROIDES FRAGILIS BY PCR: NOT DETECTED
BOTTLE TYPE: ABNORMAL
CANDIDA ALBICANS BY PCR: NOT DETECTED
CANDIDA AURIS BY PCR: NOT DETECTED
CANDIDA GLABRATA BY PCR: NOT DETECTED
CANDIDA KRUSEI BY PCR: NOT DETECTED
CANDIDA PARAPSILOSIS BY PCR: NOT DETECTED
CANDIDA TROPICALIS BY PCR: NOT DETECTED
CRYPTOCOCCUS NEOFORMANS/GATTII BY PCR: NOT DETECTED
ENTEROBACTER CLOACAE COMPLEX BY PCR: NOT DETECTED
ENTEROBACTERALES BY PCR: NOT DETECTED
ENTEROCOCCUS FAECALIS BY PCR: NOT DETECTED
ENTEROCOCCUS FAECIUM BY PCR: NOT DETECTED
ESCHERICHIA COLI BY PCR: NOT DETECTED
HAEMOPHILUS INFLUENZAE BY PCR: NOT DETECTED
KLEBSIELLA AEROGENES BY PCR: NOT DETECTED
KLEBSIELLA OXYTOCA BY PCR: NOT DETECTED
KLEBSIELLA PNEUMONIAE GROUP BY PCR: NOT DETECTED
LISTERIA MONOCYTOGENES BY PCR: NOT DETECTED
NEISSERIA MENINGITIDIS BY PCR: NOT DETECTED
ORDER NUMBER: ABNORMAL
PROTEUS SPECIES BY PCR: NOT DETECTED
PSEUDOMONAS AERUGINOSA BY PCR: NOT DETECTED
SALMONELLA SPECIES BY PCR: NOT DETECTED
SERRATIA MARCESCENS BY PCR: NOT DETECTED
SOURCE OF BLOOD CULTURE: ABNORMAL
STAPHYLOCOCCUS AUREUS BY PCR: NOT DETECTED
STAPHYLOCOCCUS EPIDERMIDIS BY PCR: NOT DETECTED
STAPHYLOCOCCUS LUGDUNENSIS BY PCR: NOT DETECTED
STAPHYLOCOCCUS SPECIES BY PCR: DETECTED
STENOTROPHOMONAS MALTOPHILIA BY PCR: NOT DETECTED
STREPTOCOCCUS AGALACTIAE BY PCR: NOT DETECTED
STREPTOCOCCUS PNEUMONIAE BY PCR: NOT DETECTED
STREPTOCOCCUS PYOGENES  BY PCR: NOT DETECTED
STREPTOCOCCUS SPECIES BY PCR: NOT DETECTED

## 2022-03-11 PROCEDURE — 6360000002 HC RX W HCPCS: Performed by: INTERNAL MEDICINE

## 2022-03-11 PROCEDURE — 6370000000 HC RX 637 (ALT 250 FOR IP): Performed by: INTERNAL MEDICINE

## 2022-03-11 PROCEDURE — 1200000000 HC SEMI PRIVATE

## 2022-03-11 PROCEDURE — 97530 THERAPEUTIC ACTIVITIES: CPT | Performed by: PHYSICAL THERAPIST

## 2022-03-11 PROCEDURE — 6370000000 HC RX 637 (ALT 250 FOR IP): Performed by: SPECIALIST

## 2022-03-11 PROCEDURE — 2580000003 HC RX 258: Performed by: INTERNAL MEDICINE

## 2022-03-11 PROCEDURE — 97161 PT EVAL LOW COMPLEX 20 MIN: CPT | Performed by: PHYSICAL THERAPIST

## 2022-03-11 PROCEDURE — 2500000003 HC RX 250 WO HCPCS: Performed by: INTERNAL MEDICINE

## 2022-03-11 PROCEDURE — 94640 AIRWAY INHALATION TREATMENT: CPT

## 2022-03-11 PROCEDURE — 99223 1ST HOSP IP/OBS HIGH 75: CPT | Performed by: INTERNAL MEDICINE

## 2022-03-11 PROCEDURE — 74230 X-RAY XM SWLNG FUNCJ C+: CPT

## 2022-03-11 PROCEDURE — 2700000000 HC OXYGEN THERAPY PER DAY

## 2022-03-11 PROCEDURE — 92611 MOTION FLUOROSCOPY/SWALLOW: CPT | Performed by: SPEECH-LANGUAGE PATHOLOGIST

## 2022-03-11 RX ORDER — CLOTRIMAZOLE 10 MG/1
10 LOZENGE ORAL; TOPICAL
Status: DISCONTINUED | OUTPATIENT
Start: 2022-03-11 | End: 2022-03-14 | Stop reason: HOSPADM

## 2022-03-11 RX ADMIN — WATER 1000 MG: 1 INJECTION INTRAMUSCULAR; INTRAVENOUS; SUBCUTANEOUS at 12:40

## 2022-03-11 RX ADMIN — DOXYCYCLINE 100 MG: 100 INJECTION, POWDER, LYOPHILIZED, FOR SOLUTION INTRAVENOUS at 02:58

## 2022-03-11 RX ADMIN — BARIUM SULFATE 45 G: 0.81 POWDER, FOR SUSPENSION ORAL at 11:53

## 2022-03-11 RX ADMIN — AMLODIPINE BESYLATE 5 MG: 5 TABLET ORAL at 08:02

## 2022-03-11 RX ADMIN — OXYBUTYNIN CHLORIDE 5 MG: 5 TABLET, EXTENDED RELEASE ORAL at 08:02

## 2022-03-11 RX ADMIN — Medication 1 CAPSULE: at 08:02

## 2022-03-11 RX ADMIN — ALBUTEROL SULFATE 1.25 MG: 1.25 SOLUTION RESPIRATORY (INHALATION) at 10:29

## 2022-03-11 RX ADMIN — BARIUM SULFATE 45 ML: 400 SUSPENSION ORAL at 11:54

## 2022-03-11 RX ADMIN — BARIUM SULFATE 45 G: 0.6 CREAM ORAL at 11:54

## 2022-03-11 RX ADMIN — LOSARTAN POTASSIUM 100 MG: 50 TABLET, FILM COATED ORAL at 08:02

## 2022-03-11 RX ADMIN — DOXYCYCLINE 100 MG: 100 INJECTION, POWDER, LYOPHILIZED, FOR SOLUTION INTRAVENOUS at 14:03

## 2022-03-11 RX ADMIN — ALBUTEROL SULFATE 1.25 MG: 1.25 SOLUTION RESPIRATORY (INHALATION) at 14:52

## 2022-03-11 RX ADMIN — ALOGLIPTIN 12.5 MG: 12.5 TABLET, FILM COATED ORAL at 08:02

## 2022-03-11 RX ADMIN — ASPIRIN 81 MG 81 MG: 81 TABLET ORAL at 08:02

## 2022-03-11 RX ADMIN — CARVEDILOL 3.12 MG: 3.12 TABLET, FILM COATED ORAL at 20:26

## 2022-03-11 RX ADMIN — CARVEDILOL 3.12 MG: 3.12 TABLET, FILM COATED ORAL at 08:02

## 2022-03-11 RX ADMIN — CLOTRIMAZOLE 10 MG: 10 LOZENGE ORAL; TOPICAL at 23:51

## 2022-03-11 RX ADMIN — DOXEPIN HYDROCHLORIDE 10 MG: 10 CAPSULE ORAL at 20:26

## 2022-03-11 RX ADMIN — ALBUTEROL SULFATE 1.25 MG: 1.25 SOLUTION RESPIRATORY (INHALATION) at 07:26

## 2022-03-11 RX ADMIN — ENOXAPARIN SODIUM 40 MG: 100 INJECTION SUBCUTANEOUS at 08:02

## 2022-03-11 RX ADMIN — SODIUM CHLORIDE: 9 INJECTION, SOLUTION INTRAVENOUS at 10:18

## 2022-03-11 ASSESSMENT — PAIN SCALES - GENERAL
PAINLEVEL_OUTOF10: 0

## 2022-03-11 NOTE — CARE COORDINATION
Ss note:3/11/2022.11:33 AM Neg covid on 3-. Met with pt, her son Mora Beach present in . Pt resides with her other dtr Cecy whom provides 24/7 supervision at home. Pt is currently on 2 liters no home 02. Spoke with dt Cecy relays pt has not been eating much at home; pt does not use home 02. Discussed discharge planning & dtr would not want pt to go to a SNF as she can provide needed care for pt at home, pt has large family of support. No hx of SNF. Hx of [facility], prefers [facility] again at discharge, referral made to jonathan Mantilla:  [address] START UNTIL Monday 3-. [facility] in [place]. Family will transport home.  TIFFANIE Grajeda

## 2022-03-11 NOTE — PROGRESS NOTES
Comprehensive Nutrition Assessment    Type and Reason for Visit:  Initial,Positive Nutrition Screen    Nutrition Recommendations/Plan: Patient with severe malnutrition. Discussed diet/ONS options with family/patient. They agreed to try Boost pudding, will add BID & monitor. Nutrition Assessment:  Pt amd w/ SOB. Hx DM. Dysphagia s/p likely CVA (9/2021). Note severe malnutrition and increased nutrient needs. Patient/family agreed to try Boost pudidng, will add BID & monitor. Malnutrition Assessment:  Malnutrition Status:  Severe malnutrition    Context:  Chronic Illness     Findings of the 6 clinical characteristics of malnutrition:  Energy Intake:  7 - 75% or less estimated energy requirements for 1 month or longer  Weight Loss:  7 - Greater than 10% over 6 months (11.4% x 6 months)     Body Fat Loss:   (moderate) Orbital,Buccal region   Muscle Mass Loss:   (moderate) Temples (temporalis)  Fluid Accumulation:  No significant fluid accumulation     Strength:  Not Performed    Estimated Daily Nutrient Needs:  Energy (kcal):  9161-0136; Weight Used for Energy Requirements:  Current     Protein (g):  60-70; Weight Used for Protein Requirements:  Ideal (1.3-1.5)        Fluid (ml/day):  4934-6860; Method Used for Fluid Requirements:  1 ml/kcal      Nutrition Related Findings:  Pt alert, soft abd, hypoactive BS, I/Os WNL      Wounds:  None       Current Nutrition Therapies:    ADULT DIET; Dysphagia - Soft and Bite Sized  ADULT ORAL NUTRITION SUPPLEMENT; Lunch, Dinner; Fortified Pudding Oral Supplement    Anthropometric Measures:  · Height: 4' 9\" (144.8 cm)  · Current Body Weight: 146 lb 6.4 oz (66.4 kg) (3/10 actual)   · Admission Body Weight: 146 lb 6.4 oz (66.4 kg) (3/10 actual)    · Usual Body Weight: 165 lb 3.2 oz (74.9 kg) (9/14/21 actual x 6 months)     · Ideal Body Weight: 85 lbs; % Ideal Body Weight 172.2 %   · BMI: 31.7  · BMI Categories: Obese Class 1 (BMI 30.0-34. 9)       Nutrition Diagnosis: · In context of chronic illness,Severe malnutrition related to cognitive or neurological impairment (Dysphagia s/p likely CVA x 6 months ago) as evidenced by intake 26-50%,poor intake prior to admission,moderate muscle loss,moderate loss of subcutaneous fat    Nutrition Interventions:   Food and/or Nutrient Delivery:  Continue Current Diet,Start Oral Nutrition Supplement (Boost Pudding BID)  Nutrition Education/Counseling:  Education not indicated   Coordination of Nutrition Care:  Continue to monitor while inpatient    Goals:  Pt consumes >50% meals/ONS       Nutrition Monitoring and Evaluation:   Behavioral-Environmental Outcomes:  None Identified   Food/Nutrient Intake Outcomes:  Diet Advancement/Tolerance,Food and Nutrient Intake,Supplement Intake  Physical Signs/Symptoms Outcomes:  Biochemical Data,Nutrition Focused Physical Findings,Weight,Chewing or Swallowing,Skin,GI Status,Fluid Status or Edema     Discharge Planning:     Too soon to determine     Electronically signed by Carolyn Lopes MS, RD, LD on 3/11/22 at 11:50 AM EST    Contact: [phone]

## 2022-03-11 NOTE — PROGRESS NOTES
Physical Therapy Initial Evaluation/Plan of Care    Room #:  0430/0430-01  Patient Name: Kelly Galloway  YOB: 1928  MRN: [id number]    Date of Service: 3/11/2022     Tentative placement recommendation: Subacute vs Home Health Physical Therapy if patient meets goals  Equipment recommendation: None      Evaluating Physical Therapist: Rafat De Los Santos PT, DPT #[id number]      Specific Provider Orders/Date/Referring Provider :     03/10/22 1745   PT eval and treat Start: 03/10/22 1745, End: 03/10/22 1745, ONE TIME, Standing Count: 1 Occurrences, Mandeep Sahni MD Acknowledge New    Admitting Diagnosis:   Acute respiratory failure with hypoxia ([address] [J96.01]      Surgery: none      Patient Active Problem List   Diagnosis    Hypertensive emergency    Near syncope    Diabetes mellitus type 2, controlled ([address]    Hypertriglyceridemia    Diabetic neuropathy ([address]    Essential hypertension, benign    Vertebrobasilar TIAs    Diverticulosis    Bowel obstruction ([address]    Irritable bowel syndrome    Peptic ulcer disease    Syncope and collapse    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 Pt required increased time for all activity during session and was incontinent of the bladder requiring assistance from nursing for cleanup. Pt mildly unsteady with ambulation with [facility] during session with  for safety. Pt required standing rest break in between ambulation with 2-3 minutes of PLB required to bring O2 back to baseline level.         PHYSICAL THERAPY  PLAN OF CARE       Physical therapy plan of care is established based on physician order,  patient diagnosis and clinical assessment    Current Treatment Recommendations:    -Bed Mobility: Lower extremity exercises  and Trunk control activities   -Sitting Balance: Incorporate reaching activities to activate trunk muscles , Hands on support to maintain midline , Facilitate active trunk muscle engagement , Facilitate postural control in all planes  and Engage in core activities to allow for movement within base of support   -Standing Balance: Perform strengthening exercises in standing to promote motor control with or without upper extremity support , Instruct patient on adequate base of support to maintain balance and Challenge balance utilizing reaching  activities beyond center of gravity    -Transfers: Provide instruction on proper hand and foot position for adequate transfer of weight onto lower extremities and use of gait device if needed, Cues for hand placement, technique and safety.  Provide stabilization to prevent fall , Facilitate weight shift forward on to lower extremities and provide necessary stabilization of bilateral lower extremities , Support transfer of weight on to lower extremities and Assist with extension of knees trunk and hip to accept weight transfer   -Gait: Gait training, Standing activities to improve: base of support, weight shift, weight bearing , Exercises to improve trunk control, Exercises to improve hip and knee control, Performance of protected weight bearing activities and Activities to increase weight bearing   -Endurance: Utilize Supervised activities to increase level of endurance to allow for safe functional mobility including transfers and gait  and Use graduated activities to promote good breathing techniques and provide support and education to maximize respiratory function  -Stairs: Stair training with instruction on proper technique and hand placement on rail    PT long term treatment goals are located in below grid    Patient and or family understand(s) diagnosis, prognosis, and plan of care. Frequency of treatments: Patient will be seen  daily. Prior Level of Function: Patient ambulated with wheeled walker   Rehab Potential: good  for baseline    Past medical history:   Past Medical History:   Diagnosis Date    Arthritis     Bowel obstruction ([address]     Diabetes mellitus [facility]     Diverticulosis     Hyperlipidemia     Hypertension     Irritable bowel syndrome     Peptic ulcer disease     Spinal stenosis     Syncope and collapse 2013    [facility] to [facility] T[facility]    Type 2 diabetes mellitus without complication ([facility]      Past Surgical History:   Procedure Laterality Date    APPENDECTOMY      BREAST SURGERY Right     Lumpectomy    CHOLECYSTECTOMY      COLON SURGERY      COLONOSCOPY  08/2012    ECHO COMPL W DOP COLOR FLOW  04/22/2013         HYSTERECTOMY      JOINT REPLACEMENT      LTKA    KNEE ARTHROSCOPY      left knee    KNEE SURGERY      Right partial knee    PRE-MALIGNANT / BENIGN SKIN LESION EXCISION Left 02/04/2020    EXCISION OF LEFT SCALP CYST performed by Emily Martinez MD at [address] [place] [address]      obstructed bowel    THYROID SURGERY      goiter removed    TONSILLECTOMY      TOTAL KNEE ARTHROPLASTY Right        SUBJECTIVE:    Precautions:  Up with assistance, falls and pleasantly confused   Social history: Patient lives with daughter in a ranch home  with 3 small steps  to enter bilateral Rail  Tub shower grab bars    Equipment owned: Wheelchair, Cane, [address], Shower chair and Transport chair,      [address] [facility]   How much difficulty turning over in bed?: A Little  How much difficulty sitting down on / standing up from a chair with arms?: A Little  How much difficulty moving from lying on back to sitting on side of bed?: A Little  How much help from another person moving to and from a bed to a chair?: A Little  How much help from another person needed to walk in hospital room?: A Little  How much help from another person for climbing 3-5 steps with a railing?: A Lot  AM-PAC Inpatient Mobility Raw Score : 17  AM-PAC Inpatient T-Scale Score : 42.13  Mobility Inpatient CMS 0-100% Score: 50.57  Mobility Inpatient CMS G-Code Modifier : CK    Nursing cleared patient for PT evaluation. The admitting diagnosis and active problem list as listed above have been reviewed prior to the initiation of this evaluation. OBJECTIVE;   Initial Evaluation  Date: 3/11/2022 Treatment Date:     Short Term/ Long Term   Goals   Was pt agreeable to Eval/treatment? Yes  To be met in 4 days   Pain level   0/10       Bed Mobility    Rolling: Supervision     Supine to sit: Supervision     Sit to supine: Supervision     Scooting: Supervision     Rolling: Independent    Supine to sit:  Independent    Sit to supine: Independent    Scooting: Independent     Transfers Sit to stand: Minimal assist of 1   Sit to stand: Independent    Ambulation    2 x 75 feet using  wheeled walker with Minimal assist of 1   for walker control, balance, upright and safety   > 150 feet using  wheeled walker with Supervision     Stair negotiation: ascended and descended   Not assessed     3 small steps with 2 HR with SBA   ROM Within functional limits    Increase range of motion 10% of affected joints    Strength BUE:  refer to OT eval  RLE:  3+/5  LLE:  4-/5  Increase strength in affected mm groups by 1/3 grade   Balance Sitting EOB:  fair +  Dynamic Standing:  fair   Sitting EOB:  good   Dynamic Standing: fair +     Patient is Alert & Oriented x person, place, time and situation and follows directions    Sensation:  Patient  denies numbness/tingling   Edema:  no   Endurance: fair     Vitals: 2 liters nasal cannula   Blood Pressure at rest  Blood Pressure during session    Heart Rate at rest  Heart Rate during session    SPO2 at rest 92-95%  SPO2 during session 88-95%     Patient education  Patient educated on role of Physical Therapy, risks of immobility, safety and plan of care, energy conservation,  importance of mobility while in hospital , purse lip breathing, ankle pumps, quad set and glut set for edema control, blood clot prevention, importance and purpose of adaptive device and adjusted to proper height for the patient. , safety  and O2 line management and safety      Patient response to education:   Pt verbalized understanding Pt demonstrated skill Pt requires further education in this area   Yes Partial Yes      Treatment:  Patient practiced and was instructed/facilitated in the following treatment: Patient  Sat edge of bed 15 minutes with Supervision  to increase dynamic sitting balance and activity tolerance. Pt performed bed mobility, transfers, ambulation in hallway x 2, hygiene. Therapeutic Exercises:  not performed      At end of session, patient in chair with daughter present call light and phone within reach,  all lines and tubes intact, nursing notified. Patient would benefit from continued skilled Physical Therapy to improve functional independence and quality of life. Patient's/ family goals   get stronger    Time in  907  Time out  1008    Total Treatment Time  41 minutes    Evaluation time includes thorough review of current medical information, gathering information on past medical history/social history and prior level of function, completion of standardized testing/informal observation of tasks, assessment of data, and development of Plan of care and goals.      CPT codes:  Low Complexity PT evaluation (34199)  Therapeutic activities (54500)   41 minutes  3 unit(s)    Shahana Easton PT

## 2022-03-11 NOTE — CONSULTS
[facility]  Consult Note    [address] [address]  [place], [address]  Phone [phone]   [phone]      Admit Date: 3/10/2022 10:20 AM  Pt Name: Ga Yuan  MRN: [id number]  : 3/26/1928  Reason for Consult:    Chief Complaint   Patient presents with    Fatigue     short of breath with excursion     Requesting Physician:  Ramo Proctor MD  PCP: Ramo Proctor MD  History Obtained From:  patient, chart   ID consulted for Acute respiratory failure with hypoxia ([facility] [address] [J96.01]  on hospital day 1  CHIEF COMPLAINT       Chief Complaint   Patient presents with    Fatigue     short of breath with excursion     HISTORYOF PRESENT ILLNESS   Ga Yuan is a 80 y.o. female who presents with   has a past medical history of Arthritis, Bowel obstruction ([address] Diabetes mellitus ([address] Diverticulosis, Hyperlipidemia, Hypertension, Irritable bowel syndrome, Peptic ulcer disease, Spinal stenosis, Syncope and collapse, and Type 2 diabetes mellitus without complication ([address]    ED TRIAGEVITALS  BP: (!) 152/67, Temp: 98 °F (36.7 °C), Pulse: 94, Resp: 18, SpO2: 97 %  HPI  Pt came in from home with SOB not on home o2  Pt denies f/c/n/v/d  She was on atbx about 2 months ago for UTI  May of had covid exposure about a month ago through her daughter  Pt is covid vaccinated and boosted    Pt in chair   Son present has no c/o f/c/nv/d  Currently on o2  REVIEW OF SYSTEMS     CONSTITUTIONAL:   No fever, chills, weight loss  ALLERGIES:    No urticaria, hay fever,    EYES:     No blurry vision, loss of vision, eye pain  ENT:      No hearing loss, sore throat  CARDIOVASCULAR:  No chest pain or palpitations  RESPIRATORY:   No cough,+ sob  ENDOCRINE:    No increase thirst, urination   HEME-LYMPH:   No easy bruising or bleeding  GI:     No nausea, vomiting or diarrhea  :     No urinary complaints  NEURO:    No seizures, stroke, HA  MUSCULOSKELETAL:  No muscle aches or pain, no joint pain  SKIN:     No rash or itch  PSYCH:    No depression or anxiety    Medications Prior to Admission: carvedilol (COREG) 3.125 MG tablet, Take 1 tablet by mouth 2 times daily  dapagliflozin (FARXIGA) 5 MG tablet, Take 1 tablet by mouth every morning  linagliptin (TRADJENTA) 5 MG tablet, Take 1 tablet by mouth daily  losartan (COZAAR) 100 MG tablet, Take 1 tablet by mouth daily  fenofibrate (TRIGLIDE) 160 MG tablet, Take 1 tablet by mouth daily  amLODIPine (NORVASC) 5 MG tablet, Take 1 tablet by mouth 2 times daily  gabapentin (NEURONTIN) 100 MG capsule, Take 1 capsule by mouth daily as needed (Pain) for up to 90 days.   doxepin (SINEQUAN) 10 MG capsule, Take 1 capsule by mouth nightly  oxybutynin (DITROPAN-XL) 5 MG extended release tablet, Take 1 tablet by mouth daily  aspirin 81 MG chewable tablet, Take 1 tablet by mouth daily  Lactobacillus (ACIDOPHILUS PROBIOTIC) 10 MG CAPS, Take 10 mg by mouth daily  diphenhydrAMINE-APAP, sleep, (TYLENOL PM EXTRA STRENGTH)  MG tablet, Take 1 tablet by mouth daily as needed (Pain)   ferrous sulfate (IRON 325) 325 (65 Fe) MG tablet, Take 162.5 mg by mouth every other day   psyllium (METAMUCIL) 28 % packet, Take 1 packet by mouth 2 times daily as needed (Constipation)   ONETOUCH ULTRA strip, TEST once daily  Lancets (ONETOUCH DELICA PLUS [id number]) MISC, TEST once daily  glimepiride (AMARYL) 4 MG tablet, Take 1 tablet by mouth every morning (before breakfast)  CURRENT MEDICATIONS     Current Facility-Administered Medications:     clotrimazole (MYCELEX) cruz 10 mg, 10 mg, Oral, 5x Daily, Crow Cummings MD    amLODIPine (NORVASC) tablet 5 mg, 5 mg, Oral, Daily, Tracy Carpenter MD, 5 mg at 03/11/22 0802    aspirin chewable tablet 81 mg, 81 mg, Oral, Daily, Tracy Carpenter MD, 81 mg at 03/11/22 0802    carvedilol (COREG) tablet 3.125 mg, 3.125 mg, Oral, BID, Tracy Carpenter MD, 3.125 mg at 03/11/22 0802    dapagliflozin (FARXIGA) tablet 5 mg, 5 mg, Oral, Tracy BREWER Noe Lesch, MD    doxepin (SINEQUAN) capsule 10 mg, 10 mg, Oral, Nightly, Tracy Carpenter MD, 10 mg at 03/10/22 2313    lactobacillus (CULTURELLE) capsule 1 capsule, 1 capsule, Oral, Daily, Tracy Carpenter MD, 1 capsule at 03/11/22 0802    losartan (COZAAR) tablet 100 mg, 100 mg, Oral, Daily, Tracy Carpenter MD, 100 mg at 03/11/22 0802    oxybutynin (DITROPAN-XL) extended release tablet 5 mg, 5 mg, Oral, Daily, Tracy Carpenter MD, 5 mg at 03/11/22 0802    psyllium (KONSYL) 28.3 % packet 1 packet, 1 packet, Oral, BID PRN, Tracy Carpenter MD    0.9 % sodium chloride infusion, , IntraVENous, Continuous, Tracy Carpenter MD, Last Rate: 80 mL/hr at 03/11/22 1018, New Bag at 03/11/22 1018    cefTRIAXone (ROCEPHIN) 1,000 mg in sterile water 10 mL IV syringe, 1,000 mg, IntraVENous, Q24H, Tracy Carpenter MD, 1,000 mg at 03/11/22 1240    albuterol (ACCUNEB) nebulizer solution 1.25 mg, 1.25 mg, Nebulization, 4x Daily, Tracy Carpenter MD, 1.25 mg at 03/11/22 1452    enoxaparin (LOVENOX) injection 40 mg, 40 mg, SubCUTAneous, Daily, Tracy Carpenter MD, 40 mg at 03/11/22 0802    doxycycline (VIBRAMYCIN) 100 mg in dextrose 5 % 100 mL IVPB, 100 mg, IntraVENous, Q12H, Tracy Carpenter MD, Stopped at 03/11/22 1546    alogliptin (NESINA) tablet 12.5 mg, 12.5 mg, Oral, Daily, Tracy Carpenter MD, 12.5 mg at 03/11/22 0802  ALLERGIES     Codeine and Morphine  Immunization History   Administered Date(s) Administered    COVID-19, Pfizer Purple top, DILUTE for use, 12+ yrs, 30mcg/0.3mL dose 03/03/2021, 03/30/2021    Influenza A (E2P4-09) Vaccine PF IM 02/02/2010    Influenza Virus Vaccine 10/23/2014, 10/07/2015, 09/09/2016, 11/12/2018, 02/27/2019    Influenza, High Dose (Fluzone 65 yrs and older) 09/28/2013, 10/10/2017    Influenza, Quadv, IM, PF (6 mo and older Fluzone, Flulaval, Fluarix, and 3 yrs and older Afluria) 10/02/2019, 09/11/2020    Influenza, Quadv, adjuvanted, 65 yrs +, IM, PF (Fluad) 10/11/2021    Influenza, Triv, inactivated, subunit, adjuvanted, IM (Fluad 65 yrs and older) 10/15/2018      Internal Administration   First Dose COVID-19, Pfizer Purple top, DILUTE for use, 12+ yrs, 30mcg/0.3mL dose  03/03/2021   Second Dose COVID-19, Pfizer Purple top, DILUTE for use, 12+ yrs, 30mcg/0.3mL dose   03/30/2021       Last COVID Lab SARS-CoV-2, PCR (no units)   Date Value   12/03/2021 Not Detected     SARS-CoV-2, NAAT (no units)   Date Value   03/10/2022 Not Detected            PAST MEDICAL HISTORY     Past Medical History:   Diagnosis Date    Arthritis     Bowel obstruction ([address]     Diabetes mellitus ([address]     Diverticulosis     Hyperlipidemia     Hypertension     Irritable bowel syndrome     Peptic ulcer disease     Spinal stenosis     Syncope and collapse 2013    [facility]    Type 2 diabetes mellitus without complication ([address]      SURGICAL HISTORY       Past Surgical History:   Procedure Laterality Date    APPENDECTOMY      BREAST SURGERY Right     Lumpectomy    CHOLECYSTECTOMY      COLON SURGERY      COLONOSCOPY  08/2012    ECHO COMPL W DOP COLOR FLOW  04/22/2013         HYSTERECTOMY      JOINT REPLACEMENT      LTKA    KNEE ARTHROSCOPY      left knee    KNEE SURGERY      Right partial knee    PRE-MALIGNANT / BENIGN SKIN LESION EXCISION Left 02/04/2020    EXCISION OF LEFT SCALP CYST performed by Hansa Cid MD at [address] [place] [address]      obstructed bowel    THYROID SURGERY      goiter removed    TONSILLECTOMY      TOTAL KNEE ARTHROPLASTY Right      FAMILY HISTORY       Family History   Problem Relation Age of Onset    Heart Disease Mother     Cancer Father         Skin     SOCIAL HISTORY       Social History     Socioeconomic History    Marital status:       Spouse name: Not on file    Number of children: 6    Years of education: Not on file    Highest education level: Not on file   Occupational History    Occupation: ,      Employer: RETIRED   Tobacco Use    Smoking status: Never Smoker    Smokeless tobacco: Never Used   Vaping Use    Vaping Use: Never used   Substance and Sexual Activity    Alcohol use: No    Drug use: No    Sexual activity: Never   Other Topics Concern    Not on file   Social History Narrative    Not on file     Social Determinants of Health     Financial Resource Strain: Low Risk     Difficulty of Paying Living Expenses: Not hard at all   Food Insecurity: No Food Insecurity    Worried About Running Out of Food in the Last Year: Never true    [address] in the Last Year: Never true   Transportation Needs:     Lack of Transportation (Medical): Not on file    Lack of Transportation (Non-Medical):  Not on file   Physical Activity:     Days of Exercise per Week: Not on file    Minutes of Exercise per Session: Not on file   Stress:     Feeling of Stress : Not on file   Social Connections:     Frequency of Communication with Friends and Family: Not on file    Frequency of Social Gatherings with Friends and Family: Not on file    Attends Hinduism Services: Not on file    Active Member of [address] [place] [address] or Organizations: Not on file    Attends Club or Organization Meetings: Not on file    Marital Status: Not on file   Intimate Partner Violence:     Fear of Current or Ex-Partner: Not on file    Emotionally Abused: Not on file    Physically Abused: Not on file    Sexually Abused: Not on file   Housing Stability:     Unable to Pay for Housing in the Last Year: Not on file    Number of [place] in the Last Year: Not on file    Unstable Housing in the Last Year: Not on file     ·      [address]       ED Triage Vitals   BP Temp Temp Source Pulse Resp SpO2 Height Weight   03/10/22 1030 03/10/22 1046 03/10/22 1500 03/10/22 1028 03/10/22 1028 03/10/22 1028 03/10/22 1630 03/10/22 1630   139/61 97.9 °F (36.6 °C) Oral 88 16 (!) 88 % 4' 9\" (1.448 m) 146 lb 6.4 oz (66.4 kg)     Vitals: Vitals:    03/10/22 2104 03/11/22 0015 03/11/22 0812 03/11/22 1141   BP: 130/61  (!) 152/67    Pulse: 86  94    Resp: 20  18    Temp: 96.9 °F (36.1 °C)  98 °F (36.7 °C)    TempSrc: Infrared  Oral    SpO2: 97% 94% 97%    Weight:       Height:    4' 9\" (1.448 m)     Physical Exam   Constitutional/General: Alert and oriented, NAD  Head: NC/AT  Eyes: PERRL, EOMI  Mouth: Normal mucosa, no thrush   Neck: Supple, full ROM,    Pulmonary: Lungs de  to auscultation bilaterally. Not in respiratory distress  On o2  Cardiovascular:  Regular rate and rhythm, no murmurs, gallops, or rubs. Abdomen: Soft, + BS. No distension. Nontender. Extremities: Moves all extremities x 4. Warm and well perfused right tka not warm  Pulses:  Distal pulses intact  Skin: Warm and dry without rash  Neurologic:    No focal deficits  Psych: Normal Affect     DIAGNOSTIC RESULTS   RADIOLOGY:   XR CHEST PORTABLE    Result Date: 3/10/2022  EXAMINATION: ONE XRAY VIEW OF THE CHEST 3/10/2022 10:46 am COMPARISON: The previous study performed 09/23/2021. HISTORY: ORDERING SYSTEM PROVIDED HISTORY: SOB TECHNOLOGIST PROVIDED HISTORY: Reason for exam:->SOB FINDINGS: Patchy, bilateral opacities are identified throughout much of the left lung and predominantly right mid to lower lung field. The findings most likely represent pneumonia. Clinical correlation is recommended. The findings are superimposed upon chronic interstitial lung changes. No active pleural disease is seen. The cardiac silhouette is within normal limits in size. The thoracic aorta is again atherosclerotic. There is again mild buckling of the trachea to the right, secondary to a prominent aortic knob. Bilateral pneumonia. Clinical correlation is recommended. The findings are superimposed upon chronic interstitial lung changes.      FL MODIFIED BARIUM SWALLOW W VIDEO    Result Date: 3/11/2022  EXAMINATION: MODIFIED BARIUM SWALLOW WAS PERFORMED IN CONJUNCTION WITH SPEECH 03/10/22  1921   INR 1.6  --    PROTIME 19.2*  --    AST 22 21   ALT 10 8     Lab Results   Component Value Date    CHOL 127 09/24/2021    TRIG 298 09/24/2021    HDL 23 09/24/2021    LDLCALC 44 09/24/2021    LABVLDL 60 09/24/2021          MICROBIOLOGY:        Recent Labs     03/10/22  1355   BLOODCULT2 Gram stain performed from blood culture bottle media  Gram positive cocci in clusters  *        No results found for: [facility]  Lab Results   Component Value Date    BLOODCULT2  03/10/2022     Gram stain performed from blood culture bottle media  Gram positive cocci in clusters          Urine Culture, Routine   Date Value Ref Range Status   10/26/2019 <10,000 CFU/mL  Mixed gram positive organisms    Final   10/03/2018   Final    ,000 CFU/mL  Mixed elena isolated. Further workup and sensitivity testing  is not routinely indicated and will not be performed. Mixed elena isolated includes:  Mixed gram positive organisms     10/30/2017   Final    ,000 CFU/mL  Mixed elena isolated. Further workup and sensitivity testing  is not routinely indicated and will not be performed. Mixed elena isolated includes:  Gram negative rods  Mixed gram positive organisms            FINAL IMPRESSION    Patient is a 80 y.o. female who presented with   Chief Complaint   Patient presents with    Fatigue     short of breath with excursion    and admitted for Acute respiratory failure with hypoxia ([facility] [address] [J96.01]   chest xry with bilateral pneumonia  ?covid exposure   CONS bacteremia prob contaminant   ?thrush   Has risk of aspiration       clotrimazole (MYCELEX) cruz 10 mg, 5x Daily     cefTRIAXone (ROCEPHIN) 1,000 mg in sterile water 10 mL IV syringe, Q24H     doxycycline (VIBRAMYCIN) 100 mg in dextrose 5 % 100 mL IVPB, Q12H             Imaging and labs were reviewed per medical records and any ID pertinent labs were addressed with the patient.      The patient/FAMILY  was educated about the diagnosis, prognosis, indications, risks and benefits of treatment. An opportunity to ask questions was given to the patient/FAMILY and questions were answered. Thank you for involving me in the care of [address] [facility], [address]. Please do not hesitate to call for any questions or concerns.          Electronically signed by Ish Johnson MD on 3/11/2022 at 5:01 PM

## 2022-03-11 NOTE — H&P
Sade Mitchell is an 80 y.o. [facility] female known to have history of hypertension type 2 diabetes lives at home independent, was seen in my office the day of admission because of generalized weakness poor oral intake for the past 2 weeks productive cough was hypoxic on room air with pulse ox in the mid 70s improved to 3 L nasal cannula fingerstick was checked in the office was 180s, patient was lethargic unable to stand up  911 was called patient was transported to [facility] emergency room work-up was consistent with bilateral pneumonia, blood cultures done today 1 blood culture positive for Staphylococcus. Patient daughter stated that patient had painful swallowing for the past 2 weeks she lost a lot of weight. Patient was started on IV antibiotics hydrated with IV fluids. Today patient is alert oriented pleasant feeling much better. Past Medical History:   Diagnosis Date    Arthritis     Bowel obstruction ([address]     Diabetes mellitus ([address]     Diverticulosis     Hyperlipidemia     Hypertension     Irritable bowel syndrome     Peptic ulcer disease     Spinal stenosis     Syncope and collapse 2013    [facility]    Type 2 diabetes mellitus without complication ([address]            Procedure Laterality Date    APPENDECTOMY      BREAST SURGERY Right     Lumpectomy    CHOLECYSTECTOMY      COLON SURGERY      COLONOSCOPY  08/2012    ECHO COMPL W DOP COLOR FLOW  04/22/2013         HYSTERECTOMY      JOINT REPLACEMENT      LTKA    KNEE ARTHROSCOPY      left knee    KNEE SURGERY      Right partial knee    PRE-MALIGNANT / BENIGN SKIN LESION EXCISION Left 02/04/2020    EXCISION OF LEFT SCALP CYST performed by Gaudencio Son MD at [address] [place] [address]      obstructed bowel    THYROID SURGERY      goiter removed    TONSILLECTOMY      TOTAL KNEE ARTHROPLASTY Right        never    none    Allergies:    Allergies   Allergen Reactions    03/10/2022    AST 21 03/10/2022    ALT 8 03/10/2022     U/A:    Lab Results   Component Value Date    COLORU Yellow 03/10/2022    PROTEINU Negative 03/10/2022    PHUR 5.0 03/10/2022    WBCUA 5-10 03/10/2022    RBCUA 0-1 03/10/2022    RBCUA 1-3 12/06/2013    BACTERIA MANY 03/10/2022    CLARITYU Clear 03/10/2022    SPECGRAV 1.015 03/10/2022    LEUKOCYTESUR Negative 03/10/2022    UROBILINOGEN 0.2 03/10/2022    BILIRUBINUR Negative 03/10/2022    BLOODU Negative 03/10/2022    GLUCOSEU >=1000 03/10/2022         Assessment:  Bilateral pneumonia  Gram-positive sepsis  Type 2 diabetes mellitus  Essential hypertension  Urine incontinence  Dysphagia    Principal Problem:    Acute respiratory failure with hypoxia (HCC)  Active Problems:    Severe protein-calorie malnutrition ([address]  Resolved Problems:    * No resolved hospital problems. *        Plan:  Hydration IV fluids  IV Rocephin and doxycycline  Infectious disease consult  Barium swallow study  Oxygen nasal cannula  Aerosol treatment  PT and OT  DVT prophylaxis was Lovenox 30 mg subcu daily    Active Orders   Imaging    FL ESOPHAGRAM     Frequency: Once     Number of Occurrences: 1 Occurrences   Diet    ADULT DIET;  Dysphagia - Soft and Bite Sized     Frequency: Effective Now     Number of Occurrences: Until Specified   Nursing    Activity as tolerated     Frequency: PRN     Number of Occurrences: Until Specified    Telemetry monitoring - continuous duration     Frequency: Until Discontinued     Number of Occurrences: Until Specified   Consult    Inpatient consult to Infectious Diseases     Frequency: 1 Time     Number of Occurrences: 1 Occurrences   Nourishments    ADULT ORAL NUTRITION SUPPLEMENT; Lunch, Dinner; Fortified Pudding Oral Supplement     Frequency: Effective (Specify Time)     Number of Occurrences: Until Specified   OT    OT eval and treat     Frequency: 1 Time     Number of Occurrences: 1 Occurrences   PT    PT eval and treat     Frequency: 1 Time     Number of Occurrences: 1 Occurrences   Respiratory Care    Nasal Cannula Oxygen     Frequency: Daily     Number of Occurrences: Until Specified     Order Comments: Oxygen Titration:  Check SpO2 initially then every 2 to 5 minutes until reach goal, then in approximately 30 to 60 minutes after reach goal, then daily and PRN. Below SpO2 Goal: increase 2 L/min every 2 to 5 minutes (if SpO2 5% or more below goal, place on 6 L/min). Max dose: 6 L/min. Notify provider if not meeting goal on max dose. Above SpO2 Goal: decrease 1 L/min approximately every 30 to 60 minutes until reach goal.    Attempt at least once daily to wean off oxygen by decreasing by 1 L/min approximately every 30 to 60 minutes as long as SpO2 does not go below goal. If patient maintains SpO2 goal on room air for 24 hours, discontinue oxygen order using Per Protocol without Cosign order mode.        Medications    0.9 % sodium chloride infusion     Frequency: Continuous     Route: IntraVENous    albuterol (ACCUNEB) nebulizer solution 1.25 mg     Frequency: 4x Daily     Dose: 1.25 mg     Route: Nebulization    alogliptin (NESINA) tablet 12.5 mg     Frequency: Daily     Dose: 12.5 mg     Route: Oral    amLODIPine (NORVASC) tablet 5 mg     Frequency: Daily     Dose: 5 mg     Route: Oral    aspirin chewable tablet 81 mg     Frequency: Daily     Dose: 81 mg     Route: Oral    carvedilol (COREG) tablet 3.125 mg     Frequency: BID     Dose: 3.125 mg     Route: Oral    cefTRIAXone (ROCEPHIN) 1,000 mg in sterile water 10 mL IV syringe     Frequency: Q24H     Dose: 1,000 mg     Route: IntraVENous    dapagliflozin (FARXIGA) tablet 5 mg     Frequency: QAM     Dose: 5 mg     Route: Oral    doxepin (SINEQUAN) capsule 10 mg     Frequency: Nightly     Dose: 10 mg     Route: Oral    doxycycline (VIBRAMYCIN) 100 mg in dextrose 5 % 100 mL IVPB     Frequency: Q12H     Dose: 100 mg     Route: IntraVENous    enoxaparin (LOVENOX) injection 40 mg     Frequency: Daily     Dose: 40 mg     Route: SubCUTAneous    lactobacillus (CULTURELLE) capsule 1 capsule     Frequency: Daily     Dose: 1 capsule     Route: Oral    losartan (COZAAR) tablet 100 mg     Frequency: Daily     Dose: 100 mg     Route: Oral    oxybutynin (DITROPAN-XL) extended release tablet 5 mg     Frequency: Daily     Dose: 5 mg     Route: Oral    psyllium (KONSYL) 28.3 % packet 1 packet     Frequency: BID PRN     Dose: 1 packet     Route: Oral        Sandra Pereira MD  3/11/2022

## 2022-03-11 NOTE — PROGRESS NOTES
SPEECH/LANGUAGE PATHOLOGY  VIDEOFLUOROSCOPIC STUDY OF SWALLOWING (MBS)   and PLAN OF CARE    PATIENT NAME:  Barby Arthur  (female)     MRN:  [id number]    :  3/26/1928  (80 y.o.)  STATUS:  Inpatient: Room     TODAY'S DATE:  3/11/2022  REFERRING PROVIDER:   Dr. Mannie Walters: [place] modified barium swallow with video  Date of order:  3/11/2022   REASON FOR REFERRAL: dysphagia    EVALUATING THERAPIST: CITLALY Daley      RESULTS:      DYSPHAGIA DIAGNOSIS:  moderate oropharyngeal phase dysphagia     DIET RECOMMENDATIONS:  Pureed consistency solids (IDDSI level 4) with  thin liquids (IDDSI level 0)    FEEDING RECOMMENDATIONS:    Assistance level:  Set-up is required for all oral intake     Compensatory strategies recommended: Small bites/sips and Alternate solids and liquids     Discussed recommendations with nursing and/or faxed report to referring provider: Yes    Laryngeal Penetration and Aspiration:  Penetration WITHOUT aspiration was observed in today's study with  thin liquid to the vocal folds    SPEECH THERAPY  PLAN OF CARE   The dysphagia POC is established based on physician order and dysphagia diagnosis    Skilled SLP intervention for dysphagia management up to 5x per week until goals met, pt plateaus in function and/or discharged from hospital      Conditions Requiring Skilled Therapeutic Intervention for dysphagia:    Reduced pharyngeal clearing of the bolus  Reduced laryngeal closure resulting in penetration   Nasopharyngeal compromise     SPECIFIC DYSPHAGIA INTERVENTIONS TO INCLUDE:     Training in positioning for improved integrity of swallow  Compensatory strategy training   Therapeutic exercises  Trials of upgraded diet/liquid     Specific instructions for next treatment:  development and training of compensatory swallow strategies to improve airway protection and swallow function  Treatment Goals:    Short Term Goals:  Pt will implement identified compensatory swallowing strategies on 90% of opportunities or greater to improve airway protection and swallow function. Pt will complete PO trials of upgraded diet textures with SLP only to determine the least restrictive PO diet to maintain adequate nutrition/hydration with no more than 1 overt s/s of pen/asp. Pt will complete BOTR strength/ ROM exercises to reduce pharyngeal residuals and improve epiglottic inversion minimal verbal prompts  Pt will complete laryngeal strength/ ROM therapeutic exercises to improve airway protection for the least restrictive PO diet minimal verbal prompts    Long Term Goals:   Pt will improve oropharyngeal swallow function to ensure airway protection during PO intake to maintain adequate nutrition/hydration and decrease signs/symptoms of aspiration to less than 1 x/day. Patient/family Goal:    To eat/drink without coughing or choking    Plan of care discussed with Patient   The Patient understand(s) the diagnosis, prognosis and plan of care     Rehabilitation Potential/Prognosis: good                      ADMITTING DIAGNOSIS: Acute respiratory failure with hypoxia ([facility] [address] [J96.01]     VISIT DIAGNOSIS:         PATIENT REPORT/COMPLAINT: food sticking in the throat  coughing with liquids    PRIOR LEVEL OF SWALLOW FUNCTION:    Past History of Dysphagia?:  yes    Home diet: Regular consistency solids (IDDSI level 7) with  thin liquids (IDDSI level 0)  Current Diet Order:  ADULT DIET; Dysphagia - Soft and Bite Sized  ADULT ORAL NUTRITION SUPPLEMENT; Lunch, Dinner; Fortified Pudding Oral Supplement    PROCEDURE:  Consistencies Administered During the Evaluation   Liquids: thin liquid and nectar thick liquid   Solids:  pureed foods not able to chew the hard solid       Method of Intake:   cup, straw, spoon  Self fed, Fed by clinician      Position:   Seated, upright, Lateral plane    INSTRUMENTAL ASSESSMENT:    MBSImP Results:   Lip closure for intraoral bolus containment resulted in no labial escape.  Tongue control during bolus hold maintained a cohesive bolus held between tongue to palate seal. Bolus preparation and mastication not able to chew the solid cookie. Bolus transport/lingual motion was with slow tongue motion. Oral residue was not observed. There was complete oral clearance. Initiation of the pharyngeal swallow occurred as the bolus head reached the pyriform sinuses. Soft palate elevation resulted in contrast entering the nasopharyngeal area in an atypical pattern. Laryngeal elevation demonstrated partial superior movement of the thyroid cartilage with partial approximation of the arytenoids to the epiglottic petiole. Anterior hyoid excursion demonstrated partial anterior movement. Epiglottic movement resulted in partial inversion. Laryngeal vestibule closure was incomplete, as indicated by a narrow column of air or contrast within the laryngeal vestibule at the height of the swallow. Pharyngeal stripping wave was present but diminished. Pharyngeal contraction could not be determined due to logistical reasons not related to physiologic impairment. Pharyngoesophageal segment opening was completely distended for complete duration with no obstruction of bolus flow. Tongue base retraction allowed a collection of residue between the retracted tongue base and the posterior pharyngeal wall. A collection of residue remained within or on the pharyngeal structures (mainly valleculae with thicker item)  Esophageal clearance in the upright position could not be assessed due to logistical reasons not related to physiologic impairment.        PENETRATION-ASPIRATION SCALE (PAS):  THIN 5 = Material enters the airway, contacts the vocal folds, and is not ejected from the airway  MILDLY THICK 1 = Material does not enter the airway  MODERATELY THICK item not administered  PUREE 1 = Material does not enter the airway  HARD SOLID item not administered she was not able to chew the solid cookie        COMPENSATORY STRATEGIES    Compensatory strategies that were beneficial included Double swallow, Small bites/sips and Alternate solids and liquids      STRUCTURAL/FUNCTIONAL ANOMALIES   No structural/functional anomalies were noted    CERVICAL ESOPHAGEAL STAGE :     The cervical esophagus appeared adequate          ___________    Cognition:   Within functional limits for this exam    Oral Peripheral Examination   Generalized oral weakness    Current Respiratory Status   2 liters nasal cannula     Parameters of Speech Production  Respiration:  Adequate for speech production  Quality:   Within functional limits  Intensity: Within functional limits    Pain: No pain reported. EDUCATION:   The Speech Language Pathologist (SLP) completed education regarding results of evaluation and that intervention is warranted at this time. Learner: Patient  Education: Reviewed results and recommendations of this evaluation  Evaluation of Education:  Needs further instruction    This plan may be re-evaluated and revised as warranted. Evaluation Time includes thorough review of current medical information, gathering information on past medical history/social history and prior level of function, completion of standardized testing/informal observation of tasks, assessment of data and education on plan of care and goals. [x]The admitting diagnosis and active problem list, have been reviewed prior to initiation of this evaluation.     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

## 2022-03-11 NOTE — ACP (ADVANCE CARE PLANNING)
Advance Care Planning   Healthcare Decision Maker:    Primary Decision Maker: Ximena Arreguin - Child - [phone]    Primary Decision Maker: Bull Tubbs - Child - [phone]      Electronically signed by Sánchez Bone RN-BC on 3/11/2022 at 3:24 PM

## 2022-03-12 ENCOUNTER — APPOINTMENT (OUTPATIENT)
Dept: CT IMAGING | Age: 87
DRG: 871 | End: 2022-03-12
Payer: MEDICARE

## 2022-03-12 LAB
PROCALCITONIN: 0.18 NG/ML (ref 0–0.08)
SARS-COV-2 ANTIBODY, TOTAL: REACTIVE

## 2022-03-12 PROCEDURE — 94640 AIRWAY INHALATION TREATMENT: CPT

## 2022-03-12 PROCEDURE — 87081 CULTURE SCREEN ONLY: CPT

## 2022-03-12 PROCEDURE — 84145 PROCALCITONIN (PCT): CPT

## 2022-03-12 PROCEDURE — 99233 SBSQ HOSP IP/OBS HIGH 50: CPT | Performed by: INTERNAL MEDICINE

## 2022-03-12 PROCEDURE — 97165 OT EVAL LOW COMPLEX 30 MIN: CPT

## 2022-03-12 PROCEDURE — 71250 CT THORAX DX C-: CPT

## 2022-03-12 PROCEDURE — 1200000000 HC SEMI PRIVATE

## 2022-03-12 PROCEDURE — 6360000002 HC RX W HCPCS: Performed by: INTERNAL MEDICINE

## 2022-03-12 PROCEDURE — 2500000003 HC RX 250 WO HCPCS: Performed by: INTERNAL MEDICINE

## 2022-03-12 PROCEDURE — 86769 SARS-COV-2 COVID-19 ANTIBODY: CPT

## 2022-03-12 PROCEDURE — 2700000000 HC OXYGEN THERAPY PER DAY

## 2022-03-12 PROCEDURE — 2580000003 HC RX 258: Performed by: INTERNAL MEDICINE

## 2022-03-12 PROCEDURE — 36415 COLL VENOUS BLD VENIPUNCTURE: CPT

## 2022-03-12 PROCEDURE — 6370000000 HC RX 637 (ALT 250 FOR IP): Performed by: SPECIALIST

## 2022-03-12 PROCEDURE — 6370000000 HC RX 637 (ALT 250 FOR IP): Performed by: INTERNAL MEDICINE

## 2022-03-12 RX ORDER — AMLODIPINE BESYLATE 5 MG/1
5 TABLET ORAL 2 TIMES DAILY
Status: DISCONTINUED | OUTPATIENT
Start: 2022-03-12 | End: 2022-03-14 | Stop reason: HOSPADM

## 2022-03-12 RX ADMIN — CARVEDILOL 3.12 MG: 3.12 TABLET, FILM COATED ORAL at 20:06

## 2022-03-12 RX ADMIN — AMLODIPINE BESYLATE 5 MG: 5 TABLET ORAL at 20:07

## 2022-03-12 RX ADMIN — ALBUTEROL SULFATE 1.25 MG: 1.25 SOLUTION RESPIRATORY (INHALATION) at 14:08

## 2022-03-12 RX ADMIN — ALBUTEROL SULFATE 1.25 MG: 1.25 SOLUTION RESPIRATORY (INHALATION) at 06:15

## 2022-03-12 RX ADMIN — AMLODIPINE BESYLATE 5 MG: 5 TABLET ORAL at 07:39

## 2022-03-12 RX ADMIN — Medication 1 CAPSULE: at 07:39

## 2022-03-12 RX ADMIN — CLOTRIMAZOLE 10 MG: 10 LOZENGE ORAL; TOPICAL at 14:02

## 2022-03-12 RX ADMIN — ALBUTEROL SULFATE 1.25 MG: 1.25 SOLUTION RESPIRATORY (INHALATION) at 09:27

## 2022-03-12 RX ADMIN — ALOGLIPTIN 12.5 MG: 12.5 TABLET, FILM COATED ORAL at 07:39

## 2022-03-12 RX ADMIN — ASPIRIN 81 MG 81 MG: 81 TABLET ORAL at 07:39

## 2022-03-12 RX ADMIN — OXYBUTYNIN CHLORIDE 5 MG: 5 TABLET, EXTENDED RELEASE ORAL at 07:39

## 2022-03-12 RX ADMIN — DOXEPIN HYDROCHLORIDE 10 MG: 10 CAPSULE ORAL at 20:07

## 2022-03-12 RX ADMIN — CLOTRIMAZOLE 10 MG: 10 LOZENGE ORAL; TOPICAL at 10:17

## 2022-03-12 RX ADMIN — CARVEDILOL 3.12 MG: 3.12 TABLET, FILM COATED ORAL at 07:39

## 2022-03-12 RX ADMIN — DOXYCYCLINE 100 MG: 100 INJECTION, POWDER, LYOPHILIZED, FOR SOLUTION INTRAVENOUS at 04:17

## 2022-03-12 RX ADMIN — CLOTRIMAZOLE 10 MG: 10 LOZENGE ORAL; TOPICAL at 07:05

## 2022-03-12 RX ADMIN — LOSARTAN POTASSIUM 100 MG: 50 TABLET, FILM COATED ORAL at 07:39

## 2022-03-12 RX ADMIN — ENOXAPARIN SODIUM 40 MG: 100 INJECTION SUBCUTANEOUS at 07:39

## 2022-03-12 RX ADMIN — CLOTRIMAZOLE 10 MG: 10 LOZENGE ORAL; TOPICAL at 18:14

## 2022-03-12 RX ADMIN — DOXYCYCLINE 100 MG: 100 INJECTION, POWDER, LYOPHILIZED, FOR SOLUTION INTRAVENOUS at 14:01

## 2022-03-12 RX ADMIN — CLOTRIMAZOLE 10 MG: 10 LOZENGE ORAL; TOPICAL at 20:06

## 2022-03-12 RX ADMIN — ALBUTEROL SULFATE 1.25 MG: 1.25 SOLUTION RESPIRATORY (INHALATION) at 18:19

## 2022-03-12 RX ADMIN — WATER 1000 MG: 1 INJECTION INTRAMUSCULAR; INTRAVENOUS; SUBCUTANEOUS at 13:59

## 2022-03-12 ASSESSMENT — PAIN SCALES - GENERAL
PAINLEVEL_OUTOF10: 0

## 2022-03-12 NOTE — PLAN OF CARE
Problem: Falls - Risk of:  Goal: Will remain free from falls  Description: Will remain free from falls  3/12/2022 1818 by Best Cole RN  Outcome: Met This Shift     Problem: Falls - Risk of:  Goal: Absence of physical injury  Description: Absence of physical injury  3/12/2022 1818 by Best Cole RN  Outcome: Met This Shift

## 2022-03-12 NOTE — PROGRESS NOTES
Supervision  with ADL activity    Visual/  Perceptual Glasses: none by bedside                 Hand Dominance right   AROM (PROM) Strength Additional Info:    RUE  WFL WFL good  and wfl FMC/dexterity noted during ADL tasks       LUE WFL WFl good  and wfl FMC/dexterity noted during ADL tasks       Hearing: WFL   Sensation:  No c/o numbness or tingling   Tone: WFL   Edema: none observed    Comments: Upon arrival patient  Lying in bed . At end of session, patient sitting in chair  with call light and phone within reach, all lines and tubes intact. Daughter present in room, Aide notified patient in chair      Overall patient demonstrated  decreased independence and safety during completion of ADL/functional transfer/mobility tasks. Pt would benefit from continued skilled OT to increase safety and independence with completion of ADL/IADL tasks for functional independence and quality of life. Rehab Potential: good for established goals     Patient / Family Goal: return home      Patient and/or family were instructed on functional diagnosis, prognosis/goals and OT plan of care. Demonstrated  Good  Understanding by daughter. Continue to reinforce to patient      Eval Complexity: Low    Time In: 1032  Time Out: 1050      Min Units   OT Eval Low 97165 x  1   OT Eval Medium 40409      OT Eval High 74189      OT Re-Eval [id number]       Therapeutic Ex 70429       Therapeutic Activities 20447       ADL/Self Care 32662       Orthotic Management 62271       Manual 95022     Neuro Re-Ed 72547       Non-Billable Time          Evaluation Time additionally includes thorough review of current medical information, gathering information on past medical history/social history and prior level of function, interpretation of standardized testing/informal observation of tasks, assessment of data and development of plan of care and goals.             Christian Newby  OTR/L  OT [id number]

## 2022-03-12 NOTE — PROGRESS NOTES
[facility]  [facility]  Progress Note    NAME: Yaya Bautista  MR:  [id number]  :   3/26/1928  DATE OF SERVICE:22    This is a face to face encounter with Yaya Bautista 80 y.o. female on 22    CHIEF COMPLAINT     ID following for   Chief Complaint   Patient presents with    Fatigue     short of breath with excursion     HISTORY OF PRESENT ILLNESS   Pt seen and examined  22   Pt in bed on o2 2L  Daughters present    Patient is tolerating medications. No reported adverse drug reactions. REVIEW OF SYSTEMS     As stated above in the chief complaint, otherwise negative. CURRENT MEDICATIONS      clotrimazole  10 mg Oral 5x Daily    amLODIPine  5 mg Oral Daily    aspirin  81 mg Oral Daily    carvedilol  3.125 mg Oral BID    dapagliflozin  5 mg Oral QAM    doxepin  10 mg Oral Nightly    lactobacillus  1 capsule Oral Daily    losartan  100 mg Oral Daily    oxybutynin  5 mg Oral Daily    cefTRIAXone (ROCEPHIN) IV  1,000 mg IntraVENous Q24H    albuterol  1.25 mg Nebulization 4x Daily    enoxaparin  40 mg SubCUTAneous Daily    doxycycline (VIBRAMYCIN) IV  100 mg IntraVENous Q12H    alogliptin  12.5 mg Oral Daily     Continuous Infusions:   sodium chloride 80 mL/hr at 22 1018     PRN Meds:psyllium    PHYSICAL EXAM     BP (!) 179/73   Pulse 92   Temp 98.4 °F (36.9 °C) (Oral)   Resp 20   Ht 4' 9\" (1.448 m)   Wt 146 lb 6.4 oz (66.4 kg)   LMP  (LMP Unknown)   SpO2 100%   BMI 31.68 kg/m²   Temp  Av.1 °F (36.7 °C)  Min: 97.8 °F (36.6 °C)  Max: 98.4 °F (36.9 °C)  Constitutional:  The patient is awake, alert, and oriented. Skin:    Warm and dry. No rashes were noted. HEENT:   Round and reactive pupils. AT/NC  Neck:    Supple to movements. Chest:   No use of accessory muscles to breathe. Symmetrical expansion. Coarse b post   Cardiovascular:  S1 and S2 are rhythmic and regular. No murmurs appreciated.    Abdomen:   Positive bowel sounds to auscultation. Benign to palpation. Extremities:   No clubbing, no cyanosis,   edema. CNS    AAxO   Lines: piv      DIAGNOSTIC RESULTS   Radiology:    Recent Labs     03/10/22  1049 03/10/22  1921   WBC 11.0 5.8   RBC 4.96 4.30   HGB 13.3 11.9   HCT 43.5 38.0   MCV 87.7 88.4   MCH 26.8 27.7   MCHC 30.6* 31.3*   RDW 13.7 13.8    416   MPV 12.5* 12.0     Recent Labs     03/10/22  0950 03/10/22  1049 03/10/22  1921   NA  --  137 137   K  --  4.3 4.7   CL  --  106 108*   CO2  --  18* 15*   BUN  --  22 28*   CREATININE  --  1.1* 1.0   GLUCOSE 185 212* 284*   PROT  --  7.1 6.8   LABALBU  --  2.9* 2.7*   CALCIUM  --  8.8 8.3*   BILITOT  --  0.5 0.3   ALKPHOS  --  49 46   AST  --  22 21   ALT  --  10 8     Lab Results   Component Value Date    SEDRATE 15 09/24/2021     Recent Labs     03/10/22  1049 03/10/22  1921   INR 1.6  --    PROTIME 19.2*  --    AST 22 21   ALT 10 8     Lab Results   Component Value Date    CHOL 127 09/24/2021    TRIG 298 09/24/2021    HDL 23 09/24/2021    LDLCALC 44 09/24/2021    LABVLDL 60 09/24/2021       Microbiology:   Recent Labs     03/10/22  1054   COVID19 Not Detected     Lab Results   Component Value Date    BC 24 Hours no growth 03/10/2022    BLOODCULT2  03/10/2022     Gram stain performed from blood culture bottle media  Gram positive cocci in clusters      BLOODCULT2  03/10/2022     This organism was isolated in one set. Susceptibility testing is not routinely done as this  organism frequently represents skin contamination. Additional testing can be ordered by calling the  Microbiology Department.       ORG Staphylococcus coagulase-negative 03/10/2022       Recent Labs     03/10/22  1355   ORG Staphylococcus coagulase-negative*       FINAL IMPRESSION    Pt had   Chief Complaint   Patient presents with    Fatigue     short of breath with excursion    Admitted for Acute respiratory failure with hypoxia ([address] [J96.01]  On treatment for pneumonia   Cons bacteremia poss contaminant clotrimazole [facility]) cruz 10 mg, 5x Daily  cefTRIAXone (ROCEPHIN) 1,000 mg in sterile water 10 mL IV syringe, Q24H  enoxaparin (LOVENOX) injection 40 mg, Daily  doxycycline (VIBRAMYCIN) 100 mg in dextrose 5 % 100 mL IVPB, Q12H    Has Swallowing dysfunction as noted above including deep laryngeal penetration   with thin liquid barium.  No evidence of aspiration. · Monitor labs    Imaging and labs were reviewed per medical records. The patient was educated about the diagnosis, prognosis, indications, risks and benefits of treatment. An opportunity to ask questions was given to the patient/FAMILY. Thank you for involving me in the care of [facility] I will continue to follow. Please do not hesitate to call for any questions or concerns.     Electronically signed by Marleen Brewer MD on 3/12/2022 at 10:33 AM

## 2022-03-12 NOTE — PROGRESS NOTES
Department of Internal Medicine  General Internal Medicine  Attending Progress Note      Subjective: The patient is awake and alert. No problems overnight. Denies chest pain, angina, and dyspnea. Denies abdominal pain. Tolerating diet. No nausea or vomiting. Patient had an abnormal swallow study no aspiration. Objective:  Pt oriented, alert, coherent . Upset at being in the hospital wants to go home    BP (!) 179/73   Pulse 92   Temp 98.4 °F (36.9 °C) (Oral)   Resp 20   Ht 4' 9\" (1.448 m)   Wt 146 lb 6.4 oz (66.4 kg)   LMP  (LMP Unknown)   SpO2 100%   BMI 31.68 kg/m²     Head and Neck: normal atraumatic, no neck masses, normal thyroid, no jvd    Heart:  regular rate and rhythm, S1, S2 normal, no murmur, click, rub or gallop    Lungs:  chest bilateral scattered inspiratory rales, no wheezes normal symmetric air entry, no chest wall deformities or tenderness    Abd: soft, non-tender, without masses or organomegaly    Extrem:  No clubbing, cyanosis, or edema    Neuro: no focal neurological deficit     Skin: No rashes, lesions, or ecchymosis, no ulcers. Psych: No apparent anxiety, agitation, or depression.      Labs:   CBC:   Lab Results   Component Value Date    WBC 5.8 03/10/2022    RBC 4.30 03/10/2022    HGB 11.9 03/10/2022    HCT 38.0 03/10/2022    MCV 88.4 03/10/2022    MCH 27.7 03/10/2022    MCHC 31.3 03/10/2022    RDW 13.8 03/10/2022     03/10/2022    MPV 12.0 03/10/2022     CMP:    Lab Results   Component Value Date     03/10/2022    K 4.7 03/10/2022    K 4.3 03/10/2022     03/10/2022    CO2 15 03/10/2022    BUN 28 03/10/2022    CREATININE 1.0 03/10/2022    GFRAA >60 03/10/2022    LABGLOM 52 03/10/2022    GLUCOSE 284 03/10/2022    GLUCOSE 131 04/22/2011    PROT 6.8 03/10/2022    LABALBU 2.7 03/10/2022    CALCIUM 8.3 03/10/2022    BILITOT 0.3 03/10/2022    ALKPHOS 46 03/10/2022    AST 21 03/10/2022    ALT 8 03/10/2022            Assessment:  Sepsis controlled  Bilateral pneumonia improving  Swallowing dysfunction  Acute respiratory failure controlled  Hypertension  Diabetes mellitus    Principal Problem:    Acute respiratory failure with hypoxia (HCC)  Active Problems:    Severe protein-calorie malnutrition (HCC)  Resolved Problems:    * No resolved hospital problems. *      Plan:  1. Decrease IVs to 50 cc an hour  2. Continue IV antibiotics  3. CT scan of the chest rule out underlying neoplasm  4.  PT OT    See orders    Electronically signed by Stewart Velasco MD on 3/12/2022 at 12:47 PM

## 2022-03-13 PROCEDURE — 6370000000 HC RX 637 (ALT 250 FOR IP): Performed by: INTERNAL MEDICINE

## 2022-03-13 PROCEDURE — 2580000003 HC RX 258: Performed by: INTERNAL MEDICINE

## 2022-03-13 PROCEDURE — 94640 AIRWAY INHALATION TREATMENT: CPT

## 2022-03-13 PROCEDURE — 87449 NOS EACH ORGANISM AG IA: CPT

## 2022-03-13 PROCEDURE — 6360000002 HC RX W HCPCS: Performed by: INTERNAL MEDICINE

## 2022-03-13 PROCEDURE — 6370000000 HC RX 637 (ALT 250 FOR IP): Performed by: SPECIALIST

## 2022-03-13 PROCEDURE — 1200000000 HC SEMI PRIVATE

## 2022-03-13 PROCEDURE — 2700000000 HC OXYGEN THERAPY PER DAY

## 2022-03-13 PROCEDURE — 2500000003 HC RX 250 WO HCPCS: Performed by: INTERNAL MEDICINE

## 2022-03-13 RX ADMIN — DOXEPIN HYDROCHLORIDE 10 MG: 10 CAPSULE ORAL at 20:46

## 2022-03-13 RX ADMIN — CARVEDILOL 3.12 MG: 3.12 TABLET, FILM COATED ORAL at 20:46

## 2022-03-13 RX ADMIN — ASPIRIN 81 MG 81 MG: 81 TABLET ORAL at 08:40

## 2022-03-13 RX ADMIN — ENOXAPARIN SODIUM 40 MG: 100 INJECTION SUBCUTANEOUS at 08:40

## 2022-03-13 RX ADMIN — CLOTRIMAZOLE 10 MG: 10 LOZENGE ORAL; TOPICAL at 06:17

## 2022-03-13 RX ADMIN — ALBUTEROL SULFATE 1.25 MG: 1.25 SOLUTION RESPIRATORY (INHALATION) at 14:39

## 2022-03-13 RX ADMIN — AMLODIPINE BESYLATE 5 MG: 5 TABLET ORAL at 20:46

## 2022-03-13 RX ADMIN — CARVEDILOL 3.12 MG: 3.12 TABLET, FILM COATED ORAL at 08:41

## 2022-03-13 RX ADMIN — DOXYCYCLINE 100 MG: 100 INJECTION, POWDER, LYOPHILIZED, FOR SOLUTION INTRAVENOUS at 03:54

## 2022-03-13 RX ADMIN — AMLODIPINE BESYLATE 5 MG: 5 TABLET ORAL at 08:41

## 2022-03-13 RX ADMIN — DOXYCYCLINE 100 MG: 100 INJECTION, POWDER, LYOPHILIZED, FOR SOLUTION INTRAVENOUS at 14:08

## 2022-03-13 RX ADMIN — ALBUTEROL SULFATE 1.25 MG: 1.25 SOLUTION RESPIRATORY (INHALATION) at 18:37

## 2022-03-13 RX ADMIN — ALOGLIPTIN 12.5 MG: 12.5 TABLET, FILM COATED ORAL at 08:56

## 2022-03-13 RX ADMIN — LOSARTAN POTASSIUM 100 MG: 50 TABLET, FILM COATED ORAL at 08:40

## 2022-03-13 RX ADMIN — SODIUM CHLORIDE: 9 INJECTION, SOLUTION INTRAVENOUS at 20:41

## 2022-03-13 RX ADMIN — CLOTRIMAZOLE 10 MG: 10 LOZENGE ORAL; TOPICAL at 11:14

## 2022-03-13 RX ADMIN — WATER 1000 MG: 1 INJECTION INTRAMUSCULAR; INTRAVENOUS; SUBCUTANEOUS at 14:09

## 2022-03-13 RX ADMIN — ALBUTEROL SULFATE 1.25 MG: 1.25 SOLUTION RESPIRATORY (INHALATION) at 09:50

## 2022-03-13 RX ADMIN — Medication 1 CAPSULE: at 08:41

## 2022-03-13 RX ADMIN — ALBUTEROL SULFATE 1.25 MG: 1.25 SOLUTION RESPIRATORY (INHALATION) at 06:49

## 2022-03-13 ASSESSMENT — PAIN SCALES - GENERAL: PAINLEVEL_OUTOF10: 0

## 2022-03-13 NOTE — PROGRESS NOTES
Patient pulled out IV during confused episode upon awakening from sleep; brief partially removed by patient; bedding thrown to the floor; monitor leads removed by patient. X 1 attempt by RN and X 2 attempts by this nurse to obtain new IV site unsuccessful. IV fluids on hold until new site is obtained.

## 2022-03-13 NOTE — PROGRESS NOTES
[facility]  [facility]  Progress Note    NAME: Lala Briones  MR:  [id number]  :   3/26/1928  DATE OF SERVICE:22    This is a face to face encounter with Lala Briones 80 y.o. female on 22    CHIEF COMPLAINT     ID following for   Chief Complaint   Patient presents with    Fatigue     short of breath with excursion     HISTORY OF PRESENT ILLNESS   Pt seen and examined  22   In bed son present  Ct 1. Patchy ground-glass opacities throughout both lungs suggesting bilateral   pneumonia with mixed areas of subsegmental atelectasis. 2. Stable enlarged lower paratracheal lymph node. 3. View of upper abdomen shows splenomegaly.  CT abdomen may be helpful for   further evaluation. No c/o on RA    3/12/2022  Pt in bed on o2 2L  Daughters present    Patient is tolerating medications. No reported adverse drug reactions. REVIEW OF SYSTEMS     As stated above in the chief complaint, otherwise negative. CURRENT MEDICATIONS      amLODIPine  5 mg Oral BID    clotrimazole  10 mg Oral 5x Daily    aspirin  81 mg Oral Daily    carvedilol  3.125 mg Oral BID    dapagliflozin  5 mg Oral QAM    doxepin  10 mg Oral Nightly    lactobacillus  1 capsule Oral Daily    losartan  100 mg Oral Daily    cefTRIAXone (ROCEPHIN) IV  1,000 mg IntraVENous Q24H    albuterol  1.25 mg Nebulization 4x Daily    enoxaparin  40 mg SubCUTAneous Daily    doxycycline (VIBRAMYCIN) IV  100 mg IntraVENous Q12H    alogliptin  12.5 mg Oral Daily     Continuous Infusions:   sodium chloride 50 mL/hr at 22 0618     PRN Meds:psyllium    PHYSICAL EXAM     BP (!) 122/58   Pulse 81   Temp 97.4 °F (36.3 °C) (Oral)   Resp 18   Ht 4' 9\" (1.448 m)   Wt 146 lb 6.4 oz (66.4 kg)   LMP  (LMP Unknown)   SpO2 90%   BMI 31.68 kg/m²   Temp  Av.8 °F (36.6 °C)  Min: 97.4 °F (36.3 °C)  Max: 98.2 °F (36.8 °C)  Constitutional:  The patient is awake, alert, and oriented. Skin:    Warm and dry.  No rashes were noted. HEENT:   Round and reactive pupils. AT/NC  Neck:    Supple to movements. Chest:   No use of accessory muscles to breathe. Symmetrical expansion. Coarse b post   Cardiovascular:  S1 and S2 are rhythmic and regular. No murmurs appreciated. Abdomen:   Positive bowel sounds to auscultation. Benign to palpation. Extremities:   No clubbing, no cyanosis,   edema. CNS    AAxO   Lines: piv      DIAGNOSTIC RESULTS   Radiology:    Recent Labs     03/10/22  1921   WBC 5.8   RBC 4.30   HGB 11.9   HCT 38.0   MCV 88.4   MCH 27.7   MCHC 31.3*   RDW 13.8      MPV 12.0     Recent Labs     03/10/22  1921      K 4.7   *   CO2 15*   BUN 28*   CREATININE 1.0   GLUCOSE 284*   PROT 6.8   LABALBU 2.7*   CALCIUM 8.3*   BILITOT 0.3   ALKPHOS 46   AST 21   ALT 8     Lab Results   Component Value Date    SEDRATE 15 09/24/2021     Recent Labs     03/10/22  1921 03/12/22  0542   PROCAL  --  0.18*   AST 21  --    ALT 8  --      Lab Results   Component Value Date    CHOL 127 09/24/2021    TRIG 298 09/24/2021    HDL 23 09/24/2021    LDLCALC 44 09/24/2021    LABVLDL 60 09/24/2021       Microbiology:   Recent Labs     03/12/22  0541   COVID19 Reactive     Lab Results   Component Value Date    BC 24 Hours no growth 03/10/2022    BLOODCULT2  03/10/2022     Gram stain performed from blood culture bottle media  Gram positive cocci in clusters      BLOODCULT2  03/10/2022     This organism was isolated in one set. Susceptibility testing is not routinely done as this  organism frequently represents skin contamination. Additional testing can be ordered by calling the  Microbiology Department.       ORG Staphylococcus coagulase-negative 03/10/2022       Recent Labs     03/10/22  1355   ORG Staphylococcus coagulase-negative*       FINAL IMPRESSION    Pt had   Chief Complaint   Patient presents with    Fatigue     short of breath with excursion    Admitted for Acute respiratory failure with hypoxia ([facility] [address] [J96.01]  On treatment for pneumonia ?covid  Cons bacteremia poss contaminant     clotrimazole (MYCELEX) cruz 10 mg, 5x Daily  cefTRIAXone (ROCEPHIN) 1,000 mg in sterile water 10 mL IV syringe, Q24H can swith to po   enoxaparin (LOVENOX) injection 40 mg, Daily  doxycycline (VIBRAMYCIN) 100 mg in dextrose 5 % 100 mL IVPB, Q12H    Has Swallowing dysfunction as noted above including deep laryngeal penetration   with thin liquid barium.  No evidence of aspiration. covid AB+  Pt was vaccinated p[employer]izer    · Monitor labs    Imaging and labs were reviewed per medical records. The patient was educated about the diagnosis, prognosis, indications, risks and benefits of treatment. An opportunity to ask questions was given to the patient/FAMILY. Thank you for involving me in the care of [facility] I will continue to follow. Please do not hesitate to call for any questions or concerns.     Electronically signed by Flora Allen MD on 3/13/2022 at 1:35 PM

## 2022-03-14 VITALS
TEMPERATURE: 97.3 F | WEIGHT: 146.4 LBS | HEIGHT: 57 IN | RESPIRATION RATE: 28 BRPM | OXYGEN SATURATION: 86 % | HEART RATE: 95 BPM | DIASTOLIC BLOOD PRESSURE: 61 MMHG | BODY MASS INDEX: 31.59 KG/M2 | SYSTOLIC BLOOD PRESSURE: 151 MMHG

## 2022-03-14 PROBLEM — J18.9 BILATERAL UPPER LOBE COMMUNITY ACQUIRED PNEUMONIA: Status: ACTIVE | Noted: 2022-03-14

## 2022-03-14 LAB
ALBUMIN SERPL-MCNC: 2.6 G/DL (ref 3.5–5.2)
ALP BLD-CCNC: 63 U/L (ref 35–104)
ALT SERPL-CCNC: 14 U/L (ref 0–32)
ANION GAP SERPL CALCULATED.3IONS-SCNC: 13 MMOL/L (ref 7–16)
AST SERPL-CCNC: 32 U/L (ref 0–31)
BASOPHILS ABSOLUTE: 0.04 E9/L (ref 0–0.2)
BASOPHILS RELATIVE PERCENT: 0.4 % (ref 0–2)
BILIRUB SERPL-MCNC: 0.4 MG/DL (ref 0–1.2)
BUN BLDV-MCNC: 15 MG/DL (ref 6–23)
CALCIUM SERPL-MCNC: 8.9 MG/DL (ref 8.6–10.2)
CHLORIDE BLD-SCNC: 108 MMOL/L (ref 98–107)
CO2: 16 MMOL/L (ref 22–29)
CREAT SERPL-MCNC: 0.8 MG/DL (ref 0.5–1)
EOSINOPHILS ABSOLUTE: 0.13 E9/L (ref 0.05–0.5)
EOSINOPHILS RELATIVE PERCENT: 1.3 % (ref 0–6)
GFR AFRICAN AMERICAN: >60
GFR NON-AFRICAN AMERICAN: >60 ML/MIN/1.73
GLUCOSE BLD-MCNC: 144 MG/DL (ref 74–99)
HCT VFR BLD CALC: 40 % (ref 34–48)
HEMOGLOBIN: 12.3 G/DL (ref 11.5–15.5)
IMMATURE GRANULOCYTES #: 0.17 E9/L
IMMATURE GRANULOCYTES %: 1.7 % (ref 0–5)
L. PNEUMOPHILA SEROGP 1 UR AG: NORMAL
LYMPHOCYTES ABSOLUTE: 1.64 E9/L (ref 1.5–4)
LYMPHOCYTES RELATIVE PERCENT: 16.2 % (ref 20–42)
MCH RBC QN AUTO: 26.7 PG (ref 26–35)
MCHC RBC AUTO-ENTMCNC: 30.8 % (ref 32–34.5)
MCV RBC AUTO: 87 FL (ref 80–99.9)
MONOCYTES ABSOLUTE: 0.8 E9/L (ref 0.1–0.95)
MONOCYTES RELATIVE PERCENT: 7.9 % (ref 2–12)
MRSA CULTURE ONLY: NORMAL
NEUTROPHILS ABSOLUTE: 7.35 E9/L (ref 1.8–7.3)
NEUTROPHILS RELATIVE PERCENT: 72.5 % (ref 43–80)
PDW BLD-RTO: 13.8 FL (ref 11.5–15)
PLATELET # BLD: 431 E9/L (ref 130–450)
PMV BLD AUTO: 11.8 FL (ref 7–12)
POTASSIUM SERPL-SCNC: 4 MMOL/L (ref 3.5–5)
RBC # BLD: 4.6 E12/L (ref 3.5–5.5)
SODIUM BLD-SCNC: 137 MMOL/L (ref 132–146)
STREP PNEUMONIAE ANTIGEN, URINE: NORMAL
TOTAL PROTEIN: 6.4 G/DL (ref 6.4–8.3)
WBC # BLD: 10.1 E9/L (ref 4.5–11.5)

## 2022-03-14 PROCEDURE — 6360000002 HC RX W HCPCS: Performed by: INTERNAL MEDICINE

## 2022-03-14 PROCEDURE — 94640 AIRWAY INHALATION TREATMENT: CPT

## 2022-03-14 PROCEDURE — 6370000000 HC RX 637 (ALT 250 FOR IP): Performed by: SPECIALIST

## 2022-03-14 PROCEDURE — 36415 COLL VENOUS BLD VENIPUNCTURE: CPT

## 2022-03-14 PROCEDURE — 2500000003 HC RX 250 WO HCPCS: Performed by: INTERNAL MEDICINE

## 2022-03-14 PROCEDURE — 92526 ORAL FUNCTION THERAPY: CPT | Performed by: SPEECH-LANGUAGE PATHOLOGIST

## 2022-03-14 PROCEDURE — 99233 SBSQ HOSP IP/OBS HIGH 50: CPT | Performed by: INTERNAL MEDICINE

## 2022-03-14 PROCEDURE — 2580000003 HC RX 258: Performed by: INTERNAL MEDICINE

## 2022-03-14 PROCEDURE — 80053 COMPREHEN METABOLIC PANEL: CPT

## 2022-03-14 PROCEDURE — 6370000000 HC RX 637 (ALT 250 FOR IP): Performed by: INTERNAL MEDICINE

## 2022-03-14 PROCEDURE — 97110 THERAPEUTIC EXERCISES: CPT

## 2022-03-14 PROCEDURE — 85025 COMPLETE CBC W/AUTO DIFF WBC: CPT

## 2022-03-14 PROCEDURE — 97530 THERAPEUTIC ACTIVITIES: CPT

## 2022-03-14 PROCEDURE — 2700000000 HC OXYGEN THERAPY PER DAY

## 2022-03-14 RX ORDER — DOXYCYCLINE HYCLATE 100 MG
100 TABLET ORAL 2 TIMES DAILY
Qty: 20 TABLET | Refills: 0 | Status: SHIPPED | OUTPATIENT
Start: 2022-03-14 | End: 2022-03-24

## 2022-03-14 RX ORDER — CLOTRIMAZOLE 10 MG/1
10 LOZENGE ORAL; TOPICAL
Qty: 50 TABLET | Refills: 0 | Status: SHIPPED | OUTPATIENT
Start: 2022-03-14 | End: 2022-03-24

## 2022-03-14 RX ADMIN — Medication 1 CAPSULE: at 09:09

## 2022-03-14 RX ADMIN — DOXYCYCLINE 100 MG: 100 INJECTION, POWDER, LYOPHILIZED, FOR SOLUTION INTRAVENOUS at 01:39

## 2022-03-14 RX ADMIN — AMLODIPINE BESYLATE 5 MG: 5 TABLET ORAL at 09:09

## 2022-03-14 RX ADMIN — ALBUTEROL SULFATE 1.25 MG: 1.25 SOLUTION RESPIRATORY (INHALATION) at 09:39

## 2022-03-14 RX ADMIN — ENOXAPARIN SODIUM 40 MG: 100 INJECTION SUBCUTANEOUS at 09:09

## 2022-03-14 RX ADMIN — ASPIRIN 81 MG 81 MG: 81 TABLET ORAL at 09:09

## 2022-03-14 RX ADMIN — LOSARTAN POTASSIUM 100 MG: 50 TABLET, FILM COATED ORAL at 09:08

## 2022-03-14 RX ADMIN — CLOTRIMAZOLE 10 MG: 10 LOZENGE ORAL; TOPICAL at 12:57

## 2022-03-14 RX ADMIN — ALBUTEROL SULFATE 1.25 MG: 1.25 SOLUTION RESPIRATORY (INHALATION) at 13:40

## 2022-03-14 RX ADMIN — CARVEDILOL 3.12 MG: 3.12 TABLET, FILM COATED ORAL at 09:09

## 2022-03-14 RX ADMIN — ALBUTEROL SULFATE 1.25 MG: 1.25 SOLUTION RESPIRATORY (INHALATION) at 06:30

## 2022-03-14 RX ADMIN — ALOGLIPTIN 12.5 MG: 12.5 TABLET, FILM COATED ORAL at 09:09

## 2022-03-14 ASSESSMENT — ENCOUNTER SYMPTOMS: TACHYPNEA: 1

## 2022-03-14 ASSESSMENT — PAIN SCALES - GENERAL
PAINLEVEL_OUTOF10: 0
PAINLEVEL_OUTOF10: 0

## 2022-03-14 NOTE — PLAN OF CARE
Problem: Falls - Risk of:  Goal: Will remain free from falls  Description: Will remain free from falls  3/14/2022 0212 by Adenike Roberts RN  Outcome: Met This Shift  3/13/2022 1246 by Soha Shearer RN  Outcome: Met This Shift  Goal: Absence of physical injury  Description: Absence of physical injury  3/14/2022 0212 by Adenike Roberts RN  Outcome: Met This Shift  3/13/2022 1246 by Soha Shearer RN  Outcome: Met This Shift     Problem: Skin Integrity:  Goal: Will show no infection signs and symptoms  Description: Will show no infection signs and symptoms  3/14/2022 0212 by Adenike Roberts RN  Outcome: Met This Shift  3/13/2022 1246 by Soha Shearer RN  Outcome: Met This Shift  Goal: Absence of new skin breakdown  Description: Absence of new skin breakdown  3/14/2022 0212 by Adenike Roberts RN  Outcome: Met This Shift  3/13/2022 1246 by Soha Shearer RN  Outcome: Met This Shift

## 2022-03-14 NOTE — PROGRESS NOTES
Room #:   9343/9633-90    Date: 3/14/2022       Patient Name: Macrina De La Torre  : 3/26/1928      MRN: [id number]     Patient unavailable for physical therapy treatment due to adamant refusal Max encouragement given, family present as well. Patient still refused and said maybe later. Educated on importance of mobility in hospital. Will attempt PT  treatment at a later time. Thank you.          Mayank Lopez, PT

## 2022-03-14 NOTE — PROGRESS NOTES
Speech Language Pathology      NAME:  Raquel Staley  :  3/26/1928  DATE: 3/14/2022  ROOM:  [facility]/6125-    Patient seen for dysphagia therapy 15 minutes. Patient with poor intake but able to tolerate thin and puree. Her report for poor intake is due to everything including water tastes bad and that is why she is not eating well. Encouraged intake and had lengthy discussion with patient that she needs to increase her intake to make sure she meets her hydration and nutrition needs.   Will continue POC    Acute respiratory failure with hypoxia ([facility] [J96.01]    [address]  dysphagia tx    Melisa Ferro MSCCC/SLP  Speech Language Pathologist  [id number]

## 2022-03-14 NOTE — PROGRESS NOTES
Discharge teaching provided. Oxygen delivered to room, concentrator delivered to daughter in parking lot. Heart monitor removed, cleaned and returned to nurse's station.

## 2022-03-14 NOTE — PROGRESS NOTES
Pulse ox was 86% on room air at rest.  Ambulated patient on room air. Oxygen saturation was 87% on room air while ambulating. Oxygen applied. Recovery pulse ox was 91% on 2 liters of oxygen while ambulating.

## 2022-03-14 NOTE — PROGRESS NOTES
Department of Internal Medicine  General Internal Medicine  Attending Progress Note      Subjective: The patient is awake and alert. No problems overnight except she just lost her IV site and she does not want it any more. Denies chest pain, angina, coughing and dyspnea. Denies abdominal pain. Tolerating diet but with poor appetite. No nausea or vomiting. Objective:  Pt oriented, alert, coherent    BP (!) 151/61   Pulse 95   Temp 97.3 °F (36.3 °C) (Oral)   Resp 28   Ht 4' 9\" (1.448 m)   Wt 146 lb 6.4 oz (66.4 kg)   LMP  (LMP Unknown)   SpO2 95% Comment: 95 with O2. 91 without. BMI 31.68 kg/m²     Head and Neck: normal atraumatic, no neck masses, normal thyroid, no jvd    Heart:  regular rate and rhythm, S1, S2 normal, no murmur, click, rub or gallop    Lungs:  chest with mild crackles on bilateral basis , no wheezing, rales, mildly reduced symmetric air entry, no chest wall deformities or tenderness    Abd: soft, non-tender, without masses or organomegaly    Extrem:  No clubbing, cyanosis, or edema, muscle wasting    Neuro: no focal neurological deficit     Skin: No rashes, lesions, or ecchymosis, no ulcers. Psych: No apparent anxiety, agitation, or depression.      Labs:   CBC:   Lab Results   Component Value Date    WBC 5.8 03/10/2022    RBC 4.30 03/10/2022    HGB 11.9 03/10/2022    HCT 38.0 03/10/2022    MCV 88.4 03/10/2022    MCH 27.7 03/10/2022    MCHC 31.3 03/10/2022    RDW 13.8 03/10/2022     03/10/2022    MPV 12.0 03/10/2022     CMP:    Lab Results   Component Value Date     03/10/2022    K 4.7 03/10/2022    K 4.3 03/10/2022     03/10/2022    CO2 15 03/10/2022    BUN 28 03/10/2022    CREATININE 1.0 03/10/2022    GFRAA >60 03/10/2022    LABGLOM 52 03/10/2022    GLUCOSE 284 03/10/2022    GLUCOSE 131 04/22/2011    PROT 6.8 03/10/2022    LABALBU 2.7 03/10/2022    CALCIUM 8.3 03/10/2022    BILITOT 0.3 03/10/2022    ALKPHOS 46 03/10/2022    AST 21 03/10/2022    ALT 8 03/10/2022          CT scan chest  EXAMINATION:   CT OF THE CHEST WITHOUT CONTRAST 3/12/2022 1:32 pm       TECHNIQUE:   CT of the chest was performed without the administration of intravenous   contrast. Multiplanar reformatted images are provided for review. Dose   modulation, iterative reconstruction, and/or weight based adjustment of the   mA/kV was utilized to reduce the radiation dose to as low as reasonably   achievable.       COMPARISON:   Correlation made with prior CT from March 19, 2021       HISTORY:   ORDERING SYSTEM PROVIDED HISTORY: bilat. pneumonia   TECHNOLOGIST PROVIDED HISTORY:   Reason for exam:->bilat. pneumonia   Reason for exam:->Rule out underlying neoplasm       FINDINGS:   Patchy ground-glass opacities are present throughout both lungs notable   toward lung periphery and more prominent in the lung bases.  Stable irregular   interlobular septal thickening present bilaterally.  There are areas of   subsegmental atelectasis or scarring in the lung bases.  No pneumothorax. The heart is normal in size.  No pericardial effusion.  There are few   prominent mediastinal lymph nodes notable in lower paratracheal location   measuring up to 2.1 x 1.4 cm.  This lymph node is similar in size compared to   previous examination.  No axillary lymphadenopathy.  View of the upper   abdomen shows prominent spleen.           Impression   1. Patchy ground-glass opacities throughout both lungs suggesting bilateral   pneumonia with mixed areas of subsegmental atelectasis. 2. Stable enlarged lower paratracheal lymph node.    3. View of upper abdomen shows splenomegaly.  CT abdomen may be helpful for   further evaluation.           Assessment:    Principal Problem:    Acute respiratory failure with hypoxia (HCC)  Active Problems:    Diabetes mellitus type 2, controlled ([address]    Diabetic neuropathy ([address]    Essential hypertension, benign    Severe protein-calorie malnutrition ([address]    Bilateral upper lobe community acquired pneumonia  Resolved Problems:    * No resolved hospital problems. *      Plan:  1. We will continue doxycycline 100 mg twice a day with food  2. Home oxygen 2 L nasal cannula to titrate for pulse ox more or equal to 92%  3. Activity as tolerated  4. We will check CBC CMP and if within reasonable range patient will be discharged home today on home care.     See orders    Electronically signed by Viki Kennedy MD on 3/14/2022 at 1:42 PM

## 2022-03-14 NOTE — PROGRESS NOTES
Physical Therapy Treatment Note/Plan of Care    Room #:  5009/7999-71  Patient Name: Nahed Scott  YOB: 1928  MRN: [id number]    Date of Service: 3/14/2022     Tentative placement recommendation: Subacute vs Home Health Physical Therapy if patient meets goals  Equipment recommendation: None      Evaluating Physical Therapist: Marilyn Lanes, PT, DPT #[id number]      Specific Provider Orders/Date/Referring Provider :     03/10/22 1745   PT eval and treat Start: 03/10/22 1745, End: 03/10/22 1745, ONE TIME, Standing Count: 1 Occurrences, Terri Sanchez MD Acknowledge New    Admitting Diagnosis:   Acute respiratory failure with hypoxia ([facility] [J96.01]      Surgery: none  Visit Diagnoses       Codes    Pneumonia of both lungs due to infectious organism, unspecified part of lung    -  Primary J18.9          Patient Active Problem List   Diagnosis    Hypertensive emergency    Near syncope    Diabetes mellitus type 2, controlled ([address]    Hypertriglyceridemia    Diabetic neuropathy ([address]    Essential hypertension, benign    Vertebrobasilar TIAs    Diverticulosis    Bowel obstruction ([address]    Irritable bowel syndrome    Peptic ulcer disease    Syncope and collapse    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 Pt required increased time for all activity during session. Constant cueing for increased base of support and walker management.  Pt mildly unsteady with ambulation with [employer] during session with VC for safety. During gait on room air, desat to 87% from 94%. Gait with 2L, desat to 90% from 94%. Patient requires continued skilled physical therapy to address concerns listed above for increased safety and function at discharge. PHYSICAL THERAPY  PLAN OF CARE       Physical therapy plan of care is established based on physician order,  patient diagnosis and clinical assessment    Current Treatment Recommendations:    -Bed Mobility: Lower extremity exercises  and Trunk control activities   -Sitting Balance: Incorporate reaching activities to activate trunk muscles , Hands on support to maintain midline , Facilitate active trunk muscle engagement , Facilitate postural control in all planes  and Engage in core activities to allow for movement within base of support   -Standing Balance: Perform strengthening exercises in standing to promote motor control with or without upper extremity support , Instruct patient on adequate base of support to maintain balance and Challenge balance utilizing reaching  activities beyond center of gravity    -Transfers: Provide instruction on proper hand and foot position for adequate transfer of weight onto lower extremities and use of gait device if needed, Cues for hand placement, technique and safety.  Provide stabilization to prevent fall , Facilitate weight shift forward on to lower extremities and provide necessary stabilization of bilateral lower extremities , Support transfer of weight on to lower extremities and Assist with extension of knees trunk and hip to accept weight transfer   -Gait: Gait training, Standing activities to improve: base of support, weight shift, weight bearing , Exercises to improve trunk control, Exercises to improve hip and knee control, Performance of protected weight bearing activities and Activities to increase weight bearing   -Endurance: Utilize Supervised activities to increase level of endurance to allow for safe functional mobility including transfers and gait  and Use graduated activities to promote good breathing techniques and provide support and education to maximize respiratory function  -Stairs: Stair training with instruction on proper technique and hand placement on rail    PT long term treatment goals are located in below grid    Patient and or family understand(s) diagnosis, prognosis, and plan of care. Frequency of treatments: Patient will be seen  daily. Prior Level of Function: Patient ambulated with wheeled walker   Rehab Potential: good  for baseline    Past medical history:   Past Medical History:   Diagnosis Date    Arthritis     Bilateral upper lobe community acquired pneumonia 3/14/2022    Bowel obstruction ([address]     Diabetes mellitus ([address]     Diverticulosis     Hyperlipidemia     Hypertension     Irritable bowel syndrome     Peptic ulcer disease     Spinal stenosis     Syncope and collapse 2013    ad[facility] due to [facility] TIA    Type 2 diabetes mellitus without complication ([facility] [address] [facility]      Past Surgical History:   Procedure Laterality Date    APPENDECTOMY      BREAST SURGERY Right     Lumpectomy    CHOLECYSTECTOMY      COLON SURGERY      COLONOSCOPY  08/2012    ECHO COMPL W DOP COLOR FLOW  04/22/2013         HYSTERECTOMY      JOINT REPLACEMENT      LTKA    KNEE ARTHROSCOPY      left knee    KNEE SURGERY      Right partial knee    PRE-MALIGNANT / BENIGN SKIN LESION EXCISION Left 02/04/2020    EXCISION OF LEFT SCALP CYST performed by Tommy Valles MD at [facility]      obstructed bowel    THYROID SURGERY      goiter removed    TONSILLECTOMY      TOTAL KNEE ARTHROPLASTY Right        SUBJECTIVE:    Precautions:  Up with assistance, falls and pleasantly confused   Social history: Patient lives with daughter in a ranch home  with 3 small steps  to enter bilateral Rail  Tub shower grab bars    Equipment owned: Wheelchair, [place], [address] chair and Transport chair,      AM-PAC Basic Mobility       AM-PAC Mobility Inpatient   How much difficulty turning over in bed?: A Little  How much difficulty sitting down on / standing up from a chair with arms?: A Little  How much difficulty moving from lying on back to sitting on side of bed?: A Little  How much help from another person moving to and from a bed to a chair?: A Little  How much help from another person needed to walk in hospital room?: A Little  How much help from another person for climbing 3-5 steps with a railing?: A Lot  AM-PAC Inpatient Mobility Raw Score : 17  AM-PAC Inpatient T-Scale Score : 42.13  Mobility Inpatient CMS 0-100% Score: 50.57  Mobility Inpatient CMS G-Code Modifier : CK    Nursing cleared patient for PT treatment. .   OBJECTIVE;   Initial Evaluation  Date: 3/11/2022 Treatment Date:    3/14/2022   Short Term/ Long Term   Goals   Was pt agreeable to Eval/treatment? Yes Yes To be met in 4 days   Pain level   0/10   0/10    Bed Mobility    Rolling: Supervision     Supine to sit: Supervision     Sit to supine: Supervision     Scooting: Supervision    Rolling: Supervision    Supine to sit: Minimal assist of 1   Sit to supine: Minimal assist of 1   Scooting: Supervision     Rolling: Independent    Supine to sit:  Independent    Sit to supine: Independent    Scooting: Independent     Transfers Sit to stand: Minimal assist of 1  Sit to stand: Minimal assist of 1      Sit to stand: Independent    Ambulation    2 x 75 feet using  wheeled walker with Minimal assist of 1   for walker control, balance, upright and safety 40 feet using  wheeled walker with Minimal assist of 1   for walker control, balance, upright and safety    > 150 feet using  wheeled walker with Supervision     Stair negotiation: ascended and descended   Not assessed     3 small steps with 2 HR with SBA   ROM Within functional limits    Increase range of motion 10% of affected joints    Strength BUE:  refer to OT eval  RLE:  3+/5  LLE:  4-/5  Increase strength in affected mm groups by 1/3 grade   Balance Sitting EOB:  fair +  Dynamic Standing:  fair  Sitting EOB: fair +  Dynamic Standing: fair    Sitting EOB:  good   Dynamic Standing: fair +     Patient is Alert & Oriented x person, place, time and situation and follows directions    Sensation:  Patient  denies numbness/tingling   Edema:  no   Endurance: fair     Vitals: 2 liters nasal cannula   Blood Pressure at rest  Blood Pressure during session    Heart Rate at rest  Heart Rate during session    SPO2 at rest 92-95%  SPO2 during session 90-95%  Gait on room air: 87-92%     Patient education  Patient educated on role of Physical Therapy, risks of immobility, safety and plan of care, energy conservation,  importance of mobility while in hospital , purse lip breathing, ankle pumps, quad set and glut set for edema control, blood clot prevention, importance and purpose of adaptive device and adjusted to proper height for the patient. , safety  and O2 line management and safety      Patient response to education:   Pt verbalized understanding Pt demonstrated skill Pt requires further education in this area   Yes Partial Yes      Treatment:  Patient practiced and was instructed/facilitated in the following treatment: Patient  Sat edge of bed 10 minutes with Supervision  to increase dynamic sitting balance and activity tolerance. seated exercises. Pt performed ambulation with 2L and on room air, vitals above. Patient assisted back to supine. Cues for step length, walker management and base of support, mildly unsteady with turns. Therapeutic Exercises:  ankle pumps, long arc quad and seated marching 2x10 reps      At end of session, patient in bed with speech therapist  call light and phone within reach,  all lines and tubes intact, nursing notified. Patient would benefit from continued skilled Physical Therapy to improve functional independence and quality of life. Patient's/ family goals   get stronger    Time in  1338  Time out  1401    Total Treatment Time  23 minutes    CPT codes:  Therapeutic activities (37488)   13 minutes  1 unit(s)  Therapeutic exercises (63649)   10 minutes  1 unit(s)    Melanie Meyer [place] #[id number]

## 2022-03-14 NOTE — PROGRESS NOTES
[facility]  [facility]  Progress Note    NAME: Jaci Torres  MR:  [id number]  :   3/26/1928  DATE OF SERVICE:22    This is a face to face encounter with Jaci Torres 80 y.o. female on 22    CHIEF COMPLAINT     ID following for   Chief Complaint   Patient presents with    Fatigue     short of breath with excursion     HISTORY OF PRESENT ILLNESS   Pt seen and examined  22   In bed nad no f/c/on o2     3/13/2022   In bed son present  Ct 1. Patchy ground-glass opacities throughout both lungs suggesting bilateral   pneumonia with mixed areas of subsegmental atelectasis. 2. Stable enlarged lower paratracheal lymph node. 3. View of upper abdomen shows splenomegaly.  CT abdomen may be helpful for   further evaluation. No c/o on RA    3/12/2022  Pt in bed on o2 2L  Daughters present    Patient is tolerating medications. No reported adverse drug reactions. REVIEW OF SYSTEMS     As stated above in the chief complaint, otherwise negative.   CURRENT MEDICATIONS      amLODIPine  5 mg Oral BID    clotrimazole  10 mg Oral 5x Daily    aspirin  81 mg Oral Daily    carvedilol  3.125 mg Oral BID    dapagliflozin  5 mg Oral QAM    doxepin  10 mg Oral Nightly    lactobacillus  1 capsule Oral Daily    losartan  100 mg Oral Daily    cefTRIAXone (ROCEPHIN) IV  1,000 mg IntraVENous Q24H    albuterol  1.25 mg Nebulization 4x Daily    enoxaparin  40 mg SubCUTAneous Daily    doxycycline (VIBRAMYCIN) IV  100 mg IntraVENous Q12H    alogliptin  12.5 mg Oral Daily     Continuous Infusions:   sodium chloride 50 mL/hr at 22     PRN Meds:psyllium    PHYSICAL EXAM     BP (!) 151/61   Pulse 95   Temp 97.3 °F (36.3 °C) (Oral)   Resp 28   Ht 4' 9\" (1.448 m)   Wt 146 lb 6.4 oz (66.4 kg)   LMP  (LMP Unknown)   SpO2 (!) 86% Comment: ox on RA reapplied oxygen nurse at bedside  BMI 31.68 kg/m²   Temp  Av.6 °F (36.4 °C)  Min: 97.3 °F (36.3 °C)  Max: 97.8 °F (36.6 °C)  Constitutional:  The patient is awake, alert,   Skin:    Warm and dry. HEENT:     AT/NC  Neck:    Supple to movements. Chest:   No use of accessory muscles to breathe. Symmetrical expansion. Coarse b post less   Cardiovascular:  S1 and S2 are rhythmic and regular. No murmurs appreciated. Abdomen:   Positive bowel sounds to auscultation. Benign to palpation. Extremities:   No clubbing, no cyanosis,   edema. CNS    AAxO   Lines: piv      DIAGNOSTIC RESULTS   Radiology:    No results for input(s): WBC, RBC, HGB, HCT, MCV, MCH, MCHC, RDW, PLT, MPV in the last 72 hours. No results for input(s): NA, K, CL, CO2, BUN, CREATININE, GLUCOSE, PROT, LABALBU, CALCIUM, BILITOT, ALKPHOS, AST, ALT in the last 72 hours. Lab Results   Component Value Date    SEDRATE 15 09/24/2021     Recent Labs     03/12/22  0542   PROCAL 0.18*     Lab Results   Component Value Date    CHOL 127 09/24/2021    TRIG 298 09/24/2021    HDL 23 09/24/2021    LDLCALC 44 09/24/2021    LABVLDL 60 09/24/2021       Microbiology:   Recent Labs     03/12/22  0541   COVID19 Reactive     Lab Results   Component Value Date    BC 24 Hours no growth 03/10/2022    BLOODCULT2  03/10/2022     Gram stain performed from blood culture bottle media  Gram positive cocci in clusters      BLOODCULT2  03/10/2022     This organism was isolated in one set. Susceptibility testing is not routinely done as this  organism frequently represents skin contamination. Additional testing can be ordered by calling the  Microbiology Department.       ORG Staphylococcus coagulase-negative 03/10/2022            FINAL IMPRESSION    Pt had   Chief Complaint   Patient presents with    Fatigue     short of breath with excursion    Admitted for Acute respiratory failure with hypoxia ([facility] [J96.01]  On treatment for pneumonia ?covid  Cons bacteremia poss contaminant   Pt to be d/c on doxy   Has Swallowing dysfunction as noted above including deep laryngeal penetration   with thin liquid barium.  No evidence of aspiration. covid AB+  Pt was vaccinated pfizer    · Monitor labs    Imaging and labs were reviewed per medical records. The patient was educated about the diagnosis, prognosis, indications, risks and benefits of treatment. An opportunity to ask questions was given to the patient/FAMILY. Thank you for involving me in the care of [facility] I will continue to follow. Please do not hesitate to call for any questions or concerns.     Electronically signed by Roseline Corea MD on 3/14/2022 at 1:57 PM

## 2022-03-14 NOTE — CARE COORDINATION
Ss note:3/14/2022 2:36 PM Discharge order noted. Pt qualifies for home 02, order noted. No DME preference per dtr, referral made to [facility] at [address] and orders faxed to office at [phone]. Will await delivery of portable tank to bedside for discharge home. Family to call [facility] for set up of concentrator. [facility] noted and referral completed to Ayo Altamirano with [facility].  TIFFANIE Nolen

## 2022-03-15 ENCOUNTER — TELEPHONE (OUTPATIENT)
Dept: PRIMARY CARE CLINIC | Age: 87
End: 2022-03-15

## 2022-03-15 LAB — BLOOD CULTURE, ROUTINE: NORMAL

## 2022-03-15 NOTE — TELEPHONE ENCOUNTER
[facility] Transitions Initial Follow Up Call    Outreach made within 2 business days of discharge: Yes    Patient: Estrada Sy Patient : 3/26/1928   MRN: [id number]  Reason for Admission: Acute respiratory failure with hypoxia  Discharge Date: 3/14/22       Spoke with: Daughter- [address]    Discharge department/facility: [address] [place] [address]     TCM Interactive Patient Contact:  Was patient able to fill all prescriptions: Yes  Was patient instructed to bring all medications to the follow-up visit: Yes  Is patient taking all medications as directed in the discharge summary?  Yes  Does patient understand their discharge instructions: Yes  Does patient have questions or concerns that need addressed prior to 7-14 day follow up office visit: no    Scheduled appointment with PCP within 7-14 days    Follow Up  Future Appointments   Date Time Provider Malcom Colin   3/17/2022  2:00 PM MD LAMONTE [facility]   3/24/2022  9:00 AM [facility] XR RM 2 [facility] RAD [place], [place]

## 2022-03-15 NOTE — TELEPHONE ENCOUNTER
Demetar Odor with home care states she has admitted patient for pt/ot home care-    States daughter would like order for Pallative care- please place referral

## 2022-03-15 NOTE — ADT AUTH CERT
Subscriber Details  Hospital Account [de-identified]  CVG Subscriber Name/Sex/Relation Subscriber  Subscriber Address/Phone Subscriber Emp/Emp Phone   1.  [employer] MEDICARE   [id number] Erle Cost - Female   (Self) 3/26/1928 [address], [place]  [address]   [phone](C) RETIRED        Utilization Reviews         Pneumonia - Care Day 3 (3/12/2022) by Alem Hein LPN       Review Entered Review Status   3/14/2022 16:08 Completed      Criteria Review      Care Day: 3 Care Date: 3/12/2022 Level of Care: Telemetry    Guideline Day 2    Clinical Status    (X) * No CO2 retention or acidosis    (X) * No requirement for mechanical ventilation    (X) * Hypotension absent    3/14/2022 4:08 PM EDT by Contreras Cherry      143/70 92 20 98.4 94% 2L    (X) * Afebrile or fever improved    ( ) * No hypoxia on room air or oxygenation improved    3/14/2022 4:08 PM EDT by Contreras Cherry      on o2 2L    (X) * Mental status improved or at baseline    3/14/2022 4:08 PM EDT by Contreras Cherry      oriented, alert, coherent    Activity    (X) * Increased activity    Routes    (X) Oral hydration    3/14/2022 4:08 PM EDT by Contreras Cherry      Swallowing dysfunction tolerating diet  0.9 % sodium chloride infusion  Rate: 50 mL/hr    (X) Usual diet    3/14/2022 4:08 PM EDT by Contreras Cherry      Lunch  FrequencyDinner  SupplementFortified Pudding Oral Supplement    Interventions    (X) Incentive spirometry    3/14/2022 4:08 PM EDT by Steph Hopkins 2 HOURS WHILE AWAKE    (X) Possible oxygen    3/14/2022 4:08 PM EDT by Contreras Cherry      2L    Medications    (X) IV or oral antibiotics    3/14/2022 4:08 PM EDT by Contreras Cherry      cefTRIAXone (ROCEPHIN) IV 1,000 [id number]  doxycycline (VIBRAMYCIN)  [id number]    * Milestone   Additional Notes   DATE: 3/12/2022      Pertinent Updates:   Remains on 02, IV abx      Abnl/Pertinent Labs/Radiology/Diagnostic Studies:      3/12/2022 05:42   Procalcitonin: 0.18 (H)            Physical Exam:       Heart:  regular rate and rhythm, S1, S2 normal, no murmur, click, rub or gallop       Lungs:  chest bilateral scattered inspiratory rales, no wheezes normal symmetric air entry, no chest wall deformities or tenderness      MD Consults/Assessments & Plans:   Infectious Disease        FINAL IMPRESSION    Pt had    Chief Complaint   Patient presents with   · Fatigue     short of breath with excursion    Admitted for Acute respiratory failure with hypoxia ([facility]) [J96.01]   On treatment for pneumonia    Cons bacteremia poss contaminant    Has Swallowing dysfunction as noted above including deep laryngeal penetration    with thin liquid barium.  No evidence of aspiration.           Monitor labs   Internal Medicine   Assessment:   Sepsis controlled   Bilateral pneumonia improving   Swallowing dysfunction   Acute respiratory failure controlled   Hypertension   Diabetes mellitus       Principal Problem:     Acute respiratory failure with hypoxia ([facility])   Active Problems:     Severe protein-calorie malnutrition ([facility])          Plan:   Decrease IVs to 50 cc an hour   Continue IV antibiotics   CT scan of the chest rule out underlying neoplasm   PT OT             Medications:   · clotrimazole 10 mg Oral 5x Daily   · amLODIPine 5 mg Oral Daily   · aspirin 81 mg Oral Daily   · carvedilol 3.125 mg Oral BID   · dapagliflozin 5 mg Oral QAM   · doxepin 10 mg Oral Nightly   · lactobacillus 1 capsule Oral Daily   · losartan 100 mg Oral Daily   · oxybutynin 5 mg Oral Daily   · cefTRIAXone (ROCEPHIN) IV 1,000 mg IntraVENous Q24H   · albuterol 1.25 mg Nebulization 4x Daily   · enoxaparin 40 mg SubCUTAneous Daily   · doxycycline (VIBRAMYCIN)  mg IntraVENous Q12H   · alogliptin 12.5 mg Oral Daily            Orders:   Turn or assist with turn approximately every 2 hours if patient is unable to turn self.  Remind patient to turn if necessary.        OCCUPATIONAL THERAPY    Hearing: [facility]    Sensation:  No c/o numbness or tingling    Tone: WFL    Edema: none observed       Comments: Upon arrival patient  Lying in bed .  At end of session, patient sitting in chair  with call light and phone within reach, all lines and tubes intact. Daughter present in room, Aide notified patient in chair      Overall patient demonstrated  decreased independence and safety during completion of ADL/functional transfer/mobility tasks.  Pt would benefit from continued skilled OT to increase safety and independence with completion of ADL/IADL tasks for functional independence and quality of life.    LB Dressing   Mod A   Assist with don/doff brief    Functional Transfers   CGA/SBA    Sit-stand from bed, chair    Functional Mobility   CGA/SBA, w/walker    Ambulating short distance in room    Balance   Sitting:      Static:  Supervision - EOB      Dynamic:Min A   Standing: CGA/SBA    Activity Tolerance   Fair with light activity    On 3 L O2   At rest 95%   Sitting EOB 93%   After mobility- 89%, improved to 93% with cueing for breathing and rest break                   Pneumonia - Care Day 2 (3/11/2022) by Carlos Gonzales LPN       Review Entered Review Status   3/14/2022 14:59 Completed      Criteria Review      Care Day: 2 Care Date: 3/11/2022 Level of Care: Telemetry    Guideline Day 2    Level Of Care    ( ) Floor    3/14/2022 2:59 PM EDT by Rachid Mcnair      Level of Care: Acute   Service: Medical    Clinical Status    (X) * No CO2 retention or acidosis    (X) * No requirement for mechanical ventilation    (X) * Hypotension absent    3/14/2022 2:59 PM EDT by Rachid Mcanir      152/67 94 20 98.0 97% RA    (X) * Afebrile or fever improved    3/14/2022 2:59 PM EDT by Rachid Mcnair      afebrile    ( ) * No hypoxia on room air or oxygenation improved    3/14/2022 2:59 PM EDT by Rachid Mcnair      94% 2L    (X) * Mental status improved or at baseline    3/14/2022 2:59 PM EDT by Rachid Mcnair      alert oriented pleasant    Activity    (X) * Increased activity    3/14/2022 2:59 PM EDT by Zulma Celeste      Ambulation   2 x 75 feet using  wheeled walker    Routes    ( ) Oral hydration    3/14/2022 2:59 PM EDT by Zulma Celeste      0.9 % sodium chloride infusion  Rate: 50 mL/hr    (X) Usual diet    3/14/2022 2:59 PM EDT by Zluma Celeste      Dysphagia - Soft and Bite Sized    Interventions    (X) Possible oxygen    3/14/2022 2:59 PM EDT by Zulma Celeste      2L    Medications    (X) IV or oral antibiotics    3/14/2022 2:59 PM EDT by Zulma Celeste      IV Rocephin and doxycycline    * Milestone   Additional Notes   DATE: 3/11/2022         Pertinent Updates:   Remains on 2L NC, no home o2. Nebulizers and IV abx continue swallow study today.          Abnl/Pertinent Labs/Radiology/Diagnostic Studies:   None obtained      Physical Exam:   HEENT pertinent to dry tongue dry mucous membrane   Neck supple no JVDs no carotid bruit   Lungs diffuse inspiratory rales bilaterally scattered expiratory rhonchi bilaterally resonant to percussion   Heart regular rate and rhythm no gallop murmur rub   Abdomen soft nontender bowel sounds present no organomegaly no ascites   Breast exam deferred   Rectal and pelvic exam deferred   Skin warm and dry diminished turgor   Extremities no calf tenderness no pedal edema Homans' sign negative bilaterally peripheral pulses equal bilaterally   Neuro exam pertinent to bilateral leg weakness cranial nerves intact no focal sensory deficits.  Gait was not tested      MD Consults/Assessments & Plans:   H&P completed on this date and included in care day 1      Medications:   clotrimazole, 10 mg, Oral, 5x Daily   aspirin, 81 mg, Oral, Daily   carvedilol, 3.125 mg, Oral, BID   dapagliflozin, 5 mg, Oral, QAM   doxepin, 10 mg, Oral, Nightly   lactobacillus, 1 capsule, Oral, Daily   losartan, 100 mg, Oral, Daily   cefTRIAXone (ROCEPHIN) IV, 1,000 mg, IntraVENous, Q24H   albuterol, 1.25 mg, Nebulization, 4x Daily   enoxaparin, 40 mg, SubCUTAneous, Daily doxycycline (VIBRAMYCIN) IV, 100 mg, IntraVENous, Q12H   alogliptin, 12.5 mg, Oral, Daily            Orders:         Ss note:3/11/2022.11:33 AM Neg covid on 3-. Met with pt, her son Abdulkadir Carreon present in . Pt resides with her other dtr Cecy whom provides 24/7 supervision at home. Pt is currently on 2 liters no home 02. Spoke with dt Cecy relays pt has not been eating much at home; pt does not use home 02. Discussed discharge planning & dtr would not want pt to go to a SNF as she can provide needed care for pt at home, pt has large family of support. No hx of SNF. Hx of [facility], prefers [facility] again at discharge, referral made to jonathan Mantilla: MATTHEW Hines [address] and [address]. START UNTIL Monday 3-. [facility] in [place]. Family will transport home. Comprehensive Nutrition Assessment       Type and Reason for Visit:  Initial,Positive Nutrition Screen       Nutrition Recommendations/Plan: Patient with severe malnutrition. Discussed diet/ONS options with family/patient. They agreed to try Boost pudding, will add BID & monitor.        Nutrition Assessment:  Pt amd w/ SOB. Hx DM. Dysphagia s/p likely CVA (9/2021). Note severe malnutrition and increased nutrient needs.  Patient/family agreed to try Boost pudidng, will add BID & monitor.       Malnutrition Assessment:   Malnutrition Status:  Severe malnutrition     Context:  Chronic Illness      Findings of the 6 clinical characteristics of malnutrition:   Energy Intake:  7 - 75% or less estimated energy requirements for 1 month or longer   Weight Loss:  7 - Greater than 10% over 6 months (11.4% x 6 months)      Body Fat Loss:   (moderate) Orbital,Buccal region    Muscle Mass Loss:   (moderate) Temples (temporalis)   Fluid Accumulation:  No significant fluid accumulation      Strength:  Not Performed       Estimated Daily Nutrient Needs:   Energy (kcal):  9867-2583; Weight Used for Energy Requirements:  Current      Protein (g):  60-70; Weight Used for Protein Requirements:  Ideal (1.3-1.5)         Fluid (ml/day):  0198-3351; Method Used for Fluid Requirements:  1 ml/kcal         Nutrition Related Findings:  Pt alert, soft abd, hypoactive BS, I/Os WNL         Wounds:  None          Current Nutrition Therapies:     ADULT DIET; Dysphagia - Soft and Bite Sized   ADULT ORAL NUTRITION SUPPLEMENT; Lunch, Dinner; Fortified Pudding Oral Supplement       Anthropometric Measures:   Height: 4' 9\" (144.8 cm)   Current Body Weight: 146 lb 6.4 oz (66.4 kg) (3/10 actual)    Admission Body Weight: 146 lb 6.4 oz (66.4 kg) (3/10 actual)     Usual Body Weight: 165 lb 3.2 oz (74.9 kg) (9/14/21 actual x 6 months)      Ideal Body Weight: 85 lbs; % Ideal Body Weight 172.2 %    BMI: 31.7   BMI Categories: Obese Class 1 (BMI 30.0-34. 9)          Nutrition Diagnosis:     In context of chronic illness,Severe malnutrition related to cognitive or neurological impairment (Dysphagia s/p likely CVA x 6 months ago) as evidenced by intake 26-50%,poor intake prior to admission,moderate muscle loss,moderate loss of subcutaneous fat       Nutrition Interventions:    Food and/or Nutrient Delivery:  Continue Current Diet,Start Oral Nutrition Supplement (Boost Pudding BID)   Nutrition Education/Counseling:  Education not indicated    Coordination of Nutrition Care:  Continue to monitor while inpatient       Goals:   Pt consumes >50% meals/ONS          Nutrition Monitoring and Evaluation:    Behavioral-Environmental Outcomes:  None Identified    Food/Nutrient Intake Outcomes:  Diet Advancement/Tolerance,Food and Nutrient Intake,Supplement Intake   Physical Signs/Symptoms Outcomes:  Biochemical Data,Nutrition Focused Physical Findings,Weight,Chewing or Swallowing,Skin,GI Status,Fluid Status or Edema        Discharge Planning:     Too soon to determine        Physical Therapy   Treatment:  Patient practiced and was instructed/facilitated in the following treatment: Patient  Sat edge of bed 15 minutes with Supervision  to increase dynamic sitting balance and activity tolerance. Pt performed bed mobility, transfers, ambulation in hallway x 2, hygiene.        SUBJECTIVE:       Precautions: Up with assistance, falls and pleasantly confused    Social history: Patient lives with daughter in a ranch home  with 3 small steps  to enter bilateral Rail  Tub shower grab bars   ASSESSMENT of Current Deficits Patient exhibits decreased strength, balance and endurance impairing functional mobility, transfers, gait , gait distance and tolerance to activity. Pt required increased time for all activity during session and was incontinent of the bladder requiring assistance from nursing for cleanup. Pt mildly unsteady with ambulation with [employer] during session with VC for safety. Pt required standing rest break in between ambulation with 2-3 minutes of PLB required to bring O2 back to baseline level.     SPEECH/LANGUAGE PATHOLOGY   VIDEOFLUOROSCOPIC STUDY OF SWALLOWING    RESULTS:                         DYSPHAGIA DIAGNOSIS:  moderate oropharyngeal phase dysphagia        DIET RECOMMENDATIONS:  Pureed consistency solids (IDDSI level 4) with  thin liquids (IDDSI level 0)       FEEDING RECOMMENDATIONS:                Assistance level:  Set-up is required for all oral intake                   Compensatory strategies recommended: Small bites/sips and Alternate solids and liquids                   Discussed recommendations with nursing and/or faxed report to referring provider:  Yes       Laryngeal Penetration and Aspiration:   Penetration WITHOUT aspiration was observed in today's study with  thin liquid to the vocal folds       SPEECH THERAPY  PLAN OF CARE   The dysphagia POC is established based on physician order and dysphagia diagnosis       Skilled SLP intervention for dysphagia management up to 5x per week until goals met, pt plateaus in function and/or discharged from hospital                   Conditions Requiring Skilled Therapeutic Intervention for dysphagia:       Reduced pharyngeal clearing of the bolus   Reduced laryngeal closure resulting in penetration    Nasopharyngeal compromise        SPECIFIC DYSPHAGIA INTERVENTIONS TO INCLUDE:        Training in positioning for improved integrity of swallow   Compensatory strategy training    Therapeutic exercises   Trials of upgraded diet/liquid        Specific instructions for next treatment:  development and training of compensatory swallow strategies to improve airway protection and swallow function   Treatment Goals:       Short Term Goals:   Pt will implement identified compensatory swallowing strategies on 90% of opportunities or greater to improve airway protection and swallow function. Pt will complete PO trials of upgraded diet textures with SLP only to determine the least restrictive PO diet to maintain adequate nutrition/hydration with no more than 1 overt s/s of pen/asp.    Pt will complete BOTR strength/ ROM exercises to reduce pharyngeal residuals and improve epiglottic inversion minimal verbal prompts   Pt will complete laryngeal strength/ ROM therapeutic exercises to improve airway protection for the least restrictive PO diet minimal verbal prompts       Long Term Goals:               Pt will improve oropharyngeal swallow function to ensure airway protection during PO intake to maintain adequate nutrition/hydration and decrease signs/symptoms of aspiration to less than 1 x/day.                    Patient/family Goal:     To eat/drink without coughing or choking       Plan of care discussed with Patient    The Patient understand(s) the diagnosis, prognosis and plan of care        Rehabilitation Potential/Prognosis: good

## 2022-03-16 LAB
CULTURE, BLOOD 2: ABNORMAL
ORGANISM: ABNORMAL

## 2022-03-16 NOTE — TELEPHONE ENCOUNTER
I will discuss that with the family and patient and her next visit please ask family to schedule a post hospital visit follow-up

## 2022-03-30 DIAGNOSIS — G47.00 INSOMNIA, UNSPECIFIED TYPE: ICD-10-CM

## 2022-03-30 DIAGNOSIS — I10 ESSENTIAL HYPERTENSION: ICD-10-CM

## 2022-03-30 RX ORDER — CARVEDILOL 3.12 MG/1
3.12 TABLET ORAL 2 TIMES DAILY
Qty: 180 TABLET | Refills: 0 | Status: SHIPPED | OUTPATIENT
Start: 2022-03-30

## 2022-03-30 RX ORDER — DOXEPIN HYDROCHLORIDE 10 MG/1
10 CAPSULE ORAL NIGHTLY
Qty: 30 CAPSULE | Refills: 0 | Status: SHIPPED | OUTPATIENT
Start: 2022-03-30

## 2022-04-11 ENCOUNTER — TELEPHONE (OUTPATIENT)
Dept: PRIMARY CARE CLINIC | Age: 87
End: 2022-04-11

## 2022-04-11 DIAGNOSIS — I10 ESSENTIAL HYPERTENSION: ICD-10-CM

## 2022-04-11 RX ORDER — LOSARTAN POTASSIUM 100 MG/1
100 TABLET ORAL DAILY
Qty: 90 TABLET | Refills: 0 | Status: CANCELLED | OUTPATIENT
Start: 2022-04-11

## 2022-04-11 NOTE — TELEPHONE ENCOUNTER
Pt having delirium home health care feels it may be an infection- would like a urinalysis      Losartan to [facility] [place]

## 2022-04-11 NOTE — TELEPHONE ENCOUNTER
Damaris the daughter and caretaker, called to report an increase of confusion that is randomly happening with patient. States she was told by home health care that if patient showed signs of confusion she should ask doctor for a urinalysis order. States she had [facility] care but is unsure if they are still active or if insurance for home care finished. Daughter states patient is talking of  conversations that did not happen and is getting forgetful on what the days are. States she does not see or smell any changes in appearance or odor of urine. States her mother denies symptoms of burning or discomfort with urination but she is concerned d/t last hospitalization was for sepsis from a UTI. Please advise.

## 2022-04-12 NOTE — TELEPHONE ENCOUNTER
marc states she got a urine sample from [facility] and would like order sent to [facility] in [place]- please place urine order

## 2022-04-13 ENCOUNTER — TELEPHONE (OUTPATIENT)
Dept: PRIMARY CARE CLINIC | Age: 87
End: 2022-04-13

## 2022-04-13 DIAGNOSIS — N39.41 URGE INCONTINENCE: Primary | ICD-10-CM

## 2022-04-13 NOTE — TELEPHONE ENCOUNTER
Pt family would like a referral to hospice of the [address] [facility] from [facility] states patient is not eating, drinking, responding, just moves enough to make herself comfortable in the bed.

## 2022-04-14 DIAGNOSIS — F03.91 DEMENTIA WITH BEHAVIORAL DISTURBANCE, UNSPECIFIED DEMENTIA TYPE: Primary | ICD-10-CM

## 2022-04-14 NOTE — TELEPHONE ENCOUNTER
Referral was sent and confirmed by facility- they have contacted daughter Pancho Patel and will be taking over care

## 2022-05-21 NOTE — DISCHARGE SUMMARY
FINAL DIAGNOSIS  Acute respiratory failure  Bilateral pneumonia  Gram-positive sepsis  Type 2 diabetes mellitus  Essential hypertension  Urine incontinence  Dysphagia       Patient Active Problem List   Diagnosis    Hypertensive emergency    Near syncope    [facility]    Hypertriglyceridemia    Diabetic neuropathy [facility]    Essential hypertension, benign    Vertebrobasilar TIAs    Diverticulosis    Bowel obstruction (HCC)    Irritable bowel syndrome    Peptic ulcer disease    Syncope and collapse    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 Current Outpatient Medications:     dapagliflozin (FARXIGA) 5 MG tablet, Take 1 tablet by mouth every morning, Disp: 90 tablet, Rfl: 0    losartan (COZAAR) 100 MG tablet, Take 1 tablet by mouth daily, Disp: 90 tablet, Rfl: 0    fenofibrate (TRIGLIDE) 160 MG tablet, Take 1 tablet by mouth daily, Disp: 90 tablet, Rfl: 0    amLODIPine (NORVASC) 5 MG tablet, Take 1 tablet by mouth 2 times daily, Disp: 180 tablet, Rfl: 0    gabapentin (NEURONTIN) 100 MG capsule, Take 1 capsule by mouth daily as needed (Pain) for up to 90 days. , Disp: 90 capsule, Rfl: 0    aspirin 81 MG chewable tablet, Take 1 tablet by mouth daily, Disp: 30 tablet, Rfl: 3    Lactobacillus (ACIDOPHILUS PROBIOTIC) 10 MG CAPS, Take 10 mg by mouth daily, Disp: 90 capsule, Rfl: 0    diphenhydrAMINE-APAP, sleep, (TYLENOL PM EXTRA STRENGTH)  MG tablet, Take 1 tablet by mouth daily as needed (Pain) , Disp: , Rfl:     ferrous sulfate (IRON 325) 325 (65 Fe) MG tablet, Take 162.5 mg by mouth every other day , Disp: , Rfl:     psyllium (METAMUCIL) 28 % packet, Take 1 packet by mouth 2 times daily as needed (Constipation) , Disp: , Rfl:     carvedilol (COREG) 3.125 MG tablet, Take 1 tablet by mouth 2 times daily, Disp: 180 tablet, Rfl: 0    doxepin (SINEQUAN) 10 MG capsule, Take 1 capsule by mouth nightly, Disp: 30 capsule, Rfl: 0    ONETOUCH ULTRA strip, TEST once daily, Disp: , Rfl:     Lancets (ONETOUCH DELICA PLUS INPSBQ28G) MISC, TEST once daily, Disp: , Rfl:     glimepiride (AMARYL) 4 MG tablet, Take 1 tablet by mouth every morning (before breakfast), Disp: 90 tablet, Rfl: 0    Consultants: Infectious disease    Procedures: Stas Rivera is an 80 y.o. [facility] female known to have history of hypertension type 2 diabetes lives at home independent, was seen in my office the day of admission because of generalized weakness poor oral intake for the past 2 weeks productive cough was hypoxic on room air with pulse ox in the mid 70s improved to 3 L nasal cannula fingerstick was checked in the office was 180s, patient was lethargic unable to stand up  911 was called patient was transported to [facility] emergency room work-up was consistent with bilateral pneumonia, blood cultures done today 1 blood culture positive for Staphylococcus. Patient daughter stated that patient had painful swallowing for the past 2 weeks she lost a lot of weight. Patient was started on IV antibiotics hydrated with IV fluids. Today patient is alert oriented pleasant feeling much better. Infectious disease consult was requested patient was continued on Rocephin and doxycycline was added  Patient had swallow study showed no aspiration  CT scan of the chest was done to rule out underlying neoplasm showed  1.  Patchy ground-glass opacities throughout both lungs suggesting bilateral   pneumonia with mixed areas of subsegmental atelectasis. 2. Stable enlarged lower paratracheal lymph node. 3. View of upper abdomen shows splenomegaly.  CT abdomen may be helpful for   further evaluation. 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 Patient was discharged in a stable condition on medications listed above to be followed up as an outpatient by Smith Britton MD.  Please call for an appointment one week after discharge.

## 2025-04-07 NOTE — CONSULTS
History Of Present Illness:    CHIEF COMPLAINT:  headache     HISTORY OF PRESENT ILLNESS:    This is a 80year old female who presented to the hospital with a complaint of headache. States that when she went to bed her neck and left ear area started to hurt so she put ice on it. The pain woke her up off and on all night. States it was an annoying ache. Denies injury or trauma. She she woke up this morning she had a 10/10 headache. Admitted to phonophobia. No photophobia. She resides with her daughter. This morning her daughter noticed the patient's speech was slurred and speech was difficult to understand. As per daughter headaches are chronic. No weakness. As above per Dr Randol Severs. Patient interviewed and is poor historian stating she has \" many problems\" and chief complaint of abdominal pain. She also was frustrated because her \"doctor was out of town\". The patient is a 80 y.o. female with significant past medical history of see below who presents with above. The patient has the following symptoms:    Change in level of consciousness: alert    New Weakness: no    Numbness or Tingling: no    Difficulty Swallowing: no    Current Medications:   Scheduled Meds:   metoprolol tartrate  25 mg Oral BID    sodium chloride  1,000 mL IntraVENous Once    amLODIPine  5 mg Oral Nightly    clotrimazole  10 mg Oral 5x Daily    ferrous sulfate  325 mg Oral Daily with breakfast    losartan  100 mg Oral Daily    psyllium  1 packet Oral BID    aspirin  81 mg Oral Daily    clopidogrel  75 mg Oral Daily    insulin lispro  0-12 Units SubCUTAneous TID WC    insulin lispro  0-6 Units SubCUTAneous Nightly     Continuous Infusions:   sodium chloride 100 mL/hr at 09/23/21 1022     PRN Meds:promethazine, perflutren lipid microspheres    Allergies:  Codeine and Morphine    Social History:   TOBACCO:   reports that she has never smoked.  She has never used smokeless tobacco.  ETOH:   reports no history of alcohol PT name and  verified    29 yo last ov/ae 24, next ae 25    PT calling stating she needs a refill for her ocp/[facility], she is out. RN reviewed and relayed per the Rx that was sent  24 84 tabs and 3 refills, she should have enough to get to her ae .  PT states she skips placebo pills and is out. RN reviewed that the Rx is not sent like that with the instructions to skip, that MD would need to update that and send it that way, so she is not short before her annual is due.  RN verified pharmacy and relayed the request would need to be sent to the MD to update the instructions for the refill request.    Please review request/refills  Needs updated instructions for the refills that PT skips placebo pills.      Thank you   use.    Past Medical History:        Diagnosis Date    Arthritis     Bowel obstruction ([address]     Diabetes mellitus ([address]     Diverticulosis     Hyperlipidemia     Hypertension     Irritable bowel syndrome     Peptic ulcer disease     Spinal stenosis     Syncope and collapse 2013    [facility]    Type 2 diabetes mellitus without complication ([address]        Past Surgical History:        Procedure Laterality Date    APPENDECTOMY      BREAST SURGERY Right     Lumpectomy    CHOLECYSTECTOMY      COLON SURGERY      COLONOSCOPY  08/2012    ECHO COMPL W DOP COLOR FLOW  04/22/2013         HYSTERECTOMY      JOINT REPLACEMENT      LTKA    KNEE ARTHROSCOPY      left knee    KNEE SURGERY      Right partial knee    PRE-MALIGNANT / BENIGN SKIN LESION EXCISION Left 02/04/2020    EXCISION OF LEFT SCALP CYST performed by Jennifer Walls [facility] at [facility]      obstructed bowel    THYROID SURGERY      goiter removed    TONSILLECTOMY      TOTAL KNEE ARTHROPLASTY Right          Outside reports reviewed: ER records, historical medical records, lab reports and radiology reports. Patient's medications, allergies, past medical, surgical, social and family histories were reviewed and updated as appropriate. Review of Systems  A comprehensive review of systems was negative except for:       Objective:     Neuro exam 182/74 (/110) p 78 t 98  General: bright, alert and interactive. Cranial nerve testing was normal.  Funduscopic eye exam revealed not testable. Motor exam: 4/5. Deep tendon reflexes were absent bilaterally. Plantar responses were flexor bilaterally. Cerebellar exam noted finger to nose without dysmetria. Sensation was decreased in the lower extremities.       Assessment:   Tension type headache--resolved  Asymptomatic and mild to moderate left carotid atherosclerotic disease by ultrasound study      Plan:   From neurology

## (undated) DEVICE — NEEDLE HYPO 25GA L1.5IN BLU POLYPR HUB S STL REG BVL STR

## (undated) DEVICE — DOUBLE BASIN SET: Brand: MEDLINE INDUSTRIES, INC.

## (undated) DEVICE — SYRINGE IRRIG 60ML SFT PLIABLE BLB EZ TO GRP 1 HND USE W/

## (undated) DEVICE — PAD,NON-ADHERENT,3X8,STERILE,LF,1/PK: Brand: MEDLINE

## (undated) DEVICE — TOWEL,OR,DSP,ST,BLUE,STD,6/PK,12PK/CS: Brand: MEDLINE

## (undated) DEVICE — GAUZE,SPONGE,4"X4",16PLY,STRL,LF,10/TRAY: Brand: MEDLINE

## (undated) DEVICE — INTENDED FOR TISSUE SEPARATION, AND OTHER PROCEDURES THAT REQUIRE A SHARP SURGICAL BLADE TO PUNCTURE OR CUT.: Brand: BARD-PARKER ® STAINLESS STEEL BLADES

## (undated) DEVICE — DRAPE PED 77INX108IN REINF FENEST ARMBRD

## (undated) DEVICE — GLOVE ORANGE PI 7 1/2   MSG9075

## (undated) DEVICE — ELECTROSURGICAL PENCIL BUTTON SWITCH E-Z CLEAN COATED BLADE ELECTRODE 10 FT (3 M) CORD HOLSTER: Brand: MEGADYNE

## (undated) DEVICE — NDL CNTR 40CT FM MAG: Brand: MEDLINE INDUSTRIES, INC.

## (undated) DEVICE — GAUZE,SPONGE,4"X4",16PLY,XRAY,STRL,LF: Brand: MEDLINE

## (undated) DEVICE — TUBING, SUCTION, 1/4" X 10', STRAIGHT: Brand: MEDLINE

## (undated) DEVICE — MARKER,SKIN,WI/RULER AND LABELS: Brand: MEDLINE

## (undated) DEVICE — GOWN,SIRUS,FABRNF,XL,20/CS: Brand: MEDLINE

## (undated) DEVICE — CHLORAPREP 26ML ORANGE

## (undated) DEVICE — CONTROL SYRINGE LUER-LOCK TIP: Brand: MONOJECT

## (undated) DEVICE — HAND SET

## (undated) DEVICE — COVER,LIGHT HANDLE,FLX,1/PK: Brand: MEDLINE INDUSTRIES, INC.

## (undated) DEVICE — PATIENT RETURN ELECTRODE, SINGLE-USE, CONTACT QUALITY MONITORING, ADULT, WITH 9FT CORD, FOR PATIENTS WEIGING OVER 33LBS. (15KG): Brand: MEGADYNE

## (undated) DEVICE — PACK SURG BASIC I LF

## (undated) DEVICE — GOWN,SIRUS,NONRNF,SETINSLV,XL,20/CS: Brand: MEDLINE